# Patient Record
Sex: FEMALE | Race: WHITE
[De-identification: names, ages, dates, MRNs, and addresses within clinical notes are randomized per-mention and may not be internally consistent; named-entity substitution may affect disease eponyms.]

---

## 2021-10-10 ENCOUNTER — TRANSCRIPTION ENCOUNTER (OUTPATIENT)
Age: 66
End: 2021-10-10

## 2021-11-20 ENCOUNTER — TRANSCRIPTION ENCOUNTER (OUTPATIENT)
Age: 66
End: 2021-11-20

## 2022-03-31 ENCOUNTER — EMERGENCY (EMERGENCY)
Facility: HOSPITAL | Age: 67
LOS: 0 days | Discharge: HOME | End: 2022-04-01
Attending: STUDENT IN AN ORGANIZED HEALTH CARE EDUCATION/TRAINING PROGRAM | Admitting: STUDENT IN AN ORGANIZED HEALTH CARE EDUCATION/TRAINING PROGRAM
Payer: MEDICARE

## 2022-03-31 ENCOUNTER — TRANSCRIPTION ENCOUNTER (OUTPATIENT)
Age: 67
End: 2022-03-31

## 2022-03-31 VITALS
TEMPERATURE: 96 F | HEART RATE: 92 BPM | WEIGHT: 108.03 LBS | DIASTOLIC BLOOD PRESSURE: 65 MMHG | RESPIRATION RATE: 18 BRPM | SYSTOLIC BLOOD PRESSURE: 136 MMHG | OXYGEN SATURATION: 99 %

## 2022-03-31 VITALS
SYSTOLIC BLOOD PRESSURE: 117 MMHG | HEART RATE: 79 BPM | TEMPERATURE: 99 F | DIASTOLIC BLOOD PRESSURE: 60 MMHG | OXYGEN SATURATION: 98 % | RESPIRATION RATE: 18 BRPM

## 2022-03-31 DIAGNOSIS — Z88.0 ALLERGY STATUS TO PENICILLIN: ICD-10-CM

## 2022-03-31 DIAGNOSIS — Z90.49 ACQUIRED ABSENCE OF OTHER SPECIFIED PARTS OF DIGESTIVE TRACT: ICD-10-CM

## 2022-03-31 DIAGNOSIS — Z91.013 ALLERGY TO SEAFOOD: ICD-10-CM

## 2022-03-31 DIAGNOSIS — L02.211 CUTANEOUS ABSCESS OF ABDOMINAL WALL: ICD-10-CM

## 2022-03-31 DIAGNOSIS — E03.9 HYPOTHYROIDISM, UNSPECIFIED: ICD-10-CM

## 2022-03-31 DIAGNOSIS — R19.09 OTHER INTRA-ABDOMINAL AND PELVIC SWELLING, MASS AND LUMP: ICD-10-CM

## 2022-03-31 DIAGNOSIS — Z79.01 LONG TERM (CURRENT) USE OF ANTICOAGULANTS: ICD-10-CM

## 2022-03-31 DIAGNOSIS — Z85.3 PERSONAL HISTORY OF MALIGNANT NEOPLASM OF BREAST: ICD-10-CM

## 2022-03-31 DIAGNOSIS — Z85.528 PERSONAL HISTORY OF OTHER MALIGNANT NEOPLASM OF KIDNEY: ICD-10-CM

## 2022-03-31 DIAGNOSIS — L53.8 OTHER SPECIFIED ERYTHEMATOUS CONDITIONS: ICD-10-CM

## 2022-03-31 DIAGNOSIS — Z86.718 PERSONAL HISTORY OF OTHER VENOUS THROMBOSIS AND EMBOLISM: ICD-10-CM

## 2022-03-31 LAB
ALBUMIN SERPL ELPH-MCNC: 4 G/DL — SIGNIFICANT CHANGE UP (ref 3.5–5.2)
ALP SERPL-CCNC: 84 U/L — SIGNIFICANT CHANGE UP (ref 30–115)
ALT FLD-CCNC: 30 U/L — SIGNIFICANT CHANGE UP (ref 0–41)
ANION GAP SERPL CALC-SCNC: 14 MMOL/L — SIGNIFICANT CHANGE UP (ref 7–14)
AST SERPL-CCNC: 34 U/L — SIGNIFICANT CHANGE UP (ref 0–41)
BASOPHILS # BLD AUTO: 0.05 K/UL — SIGNIFICANT CHANGE UP (ref 0–0.2)
BASOPHILS NFR BLD AUTO: 0.6 % — SIGNIFICANT CHANGE UP (ref 0–1)
BILIRUB SERPL-MCNC: 0.5 MG/DL — SIGNIFICANT CHANGE UP (ref 0.2–1.2)
BUN SERPL-MCNC: 18 MG/DL — SIGNIFICANT CHANGE UP (ref 10–20)
CALCIUM SERPL-MCNC: 9 MG/DL — SIGNIFICANT CHANGE UP (ref 8.5–10.1)
CHLORIDE SERPL-SCNC: 99 MMOL/L — SIGNIFICANT CHANGE UP (ref 98–110)
CO2 SERPL-SCNC: 23 MMOL/L — SIGNIFICANT CHANGE UP (ref 17–32)
CREAT SERPL-MCNC: 1 MG/DL — SIGNIFICANT CHANGE UP (ref 0.7–1.5)
EGFR: 63 ML/MIN/1.73M2 — SIGNIFICANT CHANGE UP
EOSINOPHIL # BLD AUTO: 0.03 K/UL — SIGNIFICANT CHANGE UP (ref 0–0.7)
EOSINOPHIL NFR BLD AUTO: 0.4 % — SIGNIFICANT CHANGE UP (ref 0–8)
GLUCOSE SERPL-MCNC: 82 MG/DL — SIGNIFICANT CHANGE UP (ref 70–99)
HCT VFR BLD CALC: 39.3 % — SIGNIFICANT CHANGE UP (ref 37–47)
HGB BLD-MCNC: 12.9 G/DL — SIGNIFICANT CHANGE UP (ref 12–16)
IMM GRANULOCYTES NFR BLD AUTO: 0.2 % — SIGNIFICANT CHANGE UP (ref 0.1–0.3)
LACTATE SERPL-SCNC: 1.1 MMOL/L — SIGNIFICANT CHANGE UP (ref 0.7–2)
LYMPHOCYTES # BLD AUTO: 1.19 K/UL — LOW (ref 1.2–3.4)
LYMPHOCYTES # BLD AUTO: 14.2 % — LOW (ref 20.5–51.1)
MCHC RBC-ENTMCNC: 32.8 G/DL — SIGNIFICANT CHANGE UP (ref 32–37)
MCHC RBC-ENTMCNC: 33.2 PG — HIGH (ref 27–31)
MCV RBC AUTO: 101 FL — HIGH (ref 81–99)
MONOCYTES # BLD AUTO: 1.16 K/UL — HIGH (ref 0.1–0.6)
MONOCYTES NFR BLD AUTO: 13.8 % — HIGH (ref 1.7–9.3)
NEUTROPHILS # BLD AUTO: 5.93 K/UL — SIGNIFICANT CHANGE UP (ref 1.4–6.5)
NEUTROPHILS NFR BLD AUTO: 70.8 % — SIGNIFICANT CHANGE UP (ref 42.2–75.2)
NRBC # BLD: 0 /100 WBCS — SIGNIFICANT CHANGE UP (ref 0–0)
PLATELET # BLD AUTO: 225 K/UL — SIGNIFICANT CHANGE UP (ref 130–400)
POTASSIUM SERPL-MCNC: 4.6 MMOL/L — SIGNIFICANT CHANGE UP (ref 3.5–5)
POTASSIUM SERPL-SCNC: 4.6 MMOL/L — SIGNIFICANT CHANGE UP (ref 3.5–5)
PROT SERPL-MCNC: 6.6 G/DL — SIGNIFICANT CHANGE UP (ref 6–8)
RBC # BLD: 3.89 M/UL — LOW (ref 4.2–5.4)
RBC # FLD: 13.8 % — SIGNIFICANT CHANGE UP (ref 11.5–14.5)
SODIUM SERPL-SCNC: 136 MMOL/L — SIGNIFICANT CHANGE UP (ref 135–146)
WBC # BLD: 8.38 K/UL — SIGNIFICANT CHANGE UP (ref 4.8–10.8)
WBC # FLD AUTO: 8.38 K/UL — SIGNIFICANT CHANGE UP (ref 4.8–10.8)

## 2022-03-31 PROCEDURE — 99284 EMERGENCY DEPT VISIT MOD MDM: CPT | Mod: FS

## 2022-03-31 PROCEDURE — 74177 CT ABD & PELVIS W/CONTRAST: CPT | Mod: 26,MA

## 2022-03-31 PROCEDURE — 10061 I&D ABSCESS COMP/MULTIPLE: CPT

## 2022-03-31 NOTE — ED PROVIDER NOTE - OBJECTIVE STATEMENT
Pt with hx of renal cell CA 2019 and had nephrectomy, splenectomy and partial pancreatectomy. Also required a feeding tube which had been removed in 2019. Pt is currently in remission. Pt woke this am and noticed redness ans swelling to area of the old feeding tube scar. Denies fever, chills, NVDC

## 2022-03-31 NOTE — ED ADULT TRIAGE NOTE - CHIEF COMPLAINT QUOTE
Pt sent from urgent care for evaluation of possible abdominal mass. Pt denies fever/nausea/vomiting, reports pain during palpitation of area.

## 2022-03-31 NOTE — ED PROVIDER NOTE - PROVIDER TOKENS
PROVIDER:[TOKEN:[[id number]],FOLLOWUP:[1-3 Days]],FREE:[LAST:[Your oncologist],PHONE:[(   )    -],FAX:[(   )    -],FOLLOWUP:[1-3 Days]]

## 2022-03-31 NOTE — ED PROVIDER NOTE - CARE PROVIDER_API CALL
Bolivar Cardona (DO)  Surgery; Surgical Critical Care  [address] [place] [address]  Phone: [phone]  Fax: [phone]  Follow Up Time: 1-3 Days    Your oncologist,   Phone: (   )    -  Fax: (   )    -  Follow Up Time: 1-3 Days

## 2022-03-31 NOTE — ED PROVIDER NOTE - ATTENDING CONTRIBUTION TO CARE
66-year-old female with history of left renal cell carcinoma in 2019 s/p nephrectomy, splenectomy, partial pancreatectomy, presents with swelling and redness to her epigastric abdomen at the site of her prior remote PEG tube placement. Denies fever, chills, vomiting, diarrhea, constipation, hematochezia, and all other symptoms. On exam, afebrile, hemodynamically stable, saturating well, NAD, well appearing, sitting comfortably in chair, no WOB, speaking full sentences, head NCAT, EOMI grossly, anicteric, MMM, breathing comfortably on room air, abd soft, NT, ND, no rebound or guarding, noted epigastric erythema with swelling/induration, AAO, CN's 3-12 grossly intact, SAMSON spontaneously, no leg cyanosis or edema, skin warm, well perfused. 66-year-old female with history of left renal cell carcinoma in 2019 s/p nephrectomy, splenectomy, partial pancreatectomy, presents with swelling and redness to her epigastric abdomen at the site of her prior remote PEG tube placement. Denies fever, chills, vomiting, diarrhea, constipation, hematochezia, and all other symptoms. On exam, afebrile, hemodynamically stable, saturating well, NAD, well appearing, sitting comfortably in chair, no WOB, speaking full sentences, head NCAT, EOMI grossly, anicteric, MMM, breathing comfortably on room air, abd soft, NT, ND, no rebound or guarding, noted epigastric erythema with swelling/induration, AAO, CN's 3-12 grossly intact, SAMSON spontaneously, no leg cyanosis or edema, skin warm, well perfused. Appearance c/w abdominal wall abscess, confirmed on CT. Also concern for internal mass as well as concern for new onset metastatic lesions. Surgery consulted and 66-year-old female with history of left renal cell carcinoma in 2019 s/p nephrectomy, splenectomy, partial pancreatectomy, presents with swelling and redness to her epigastric abdomen at the site of her prior remote PEG tube placement. Denies fever, chills, vomiting, diarrhea, constipation, hematochezia, and all other symptoms. On exam, afebrile, hemodynamically stable, saturating well, NAD, well appearing, sitting comfortably in chair, no WOB, speaking full sentences, head NCAT, EOMI grossly, anicteric, MMM, breathing comfortably on room air, abd soft, NT, ND, no rebound or guarding, noted epigastric erythema with swelling/induration, AAO, CN's 3-12 grossly intact, SAMSON spontaneously, no leg cyanosis or edema, skin warm, well perfused. Appearance c/w abdominal wall abscess, confirmed on CT. Also concern for internal mass as well as concern for new onset metastatic lesions. Surgery consulted and pending. NAD, well appearing. Signed off care to Dr. TIFFANIE Padilla who will f/u surg c/s. 66-year-old female with history of left renal cell carcinoma in 2019 s/p nephrectomy, splenectomy, partial pancreatectomy, presents with swelling and redness to her epigastric abdomen at the site of her prior remote PEG tube placement. Denies fever, chills, vomiting, diarrhea, constipation, hematochezia, and all other symptoms. On exam, afebrile, hemodynamically stable, saturating well, NAD, well appearing, sitting comfortably in chair, no WOB, speaking full sentences, head NCAT, EOMI grossly, anicteric, MMM, breathing comfortably on room air, abd soft, NT, ND, no rebound or guarding, noted epigastric erythema with swelling/induration, AAO, CN's 3-12 grossly intact, SAMSON spontaneously, no leg cyanosis or edema, skin warm, well perfused. Appearance c/w abdominal wall abscess, confirmed on CT. Also concern for internal mass as well as concern for new onset metastatic lesions. Surgery consulted and I d/w Dr. Martins and will I&D discharged pt, does not think there is a new abdominal mass but rather a surgical clip from prior. This is difficult to verify without prior imaging comparison. At any rate pt has good onc f/u and I d/w pt and son the findings and will f/u closely. NAD, well appearing. Signed off care to Dr. TIFFANIE Padilla who will f/u after I&D, anticipate discharge on abx.

## 2022-03-31 NOTE — ED PROVIDER NOTE - NS ED ROS FT
CONST: No fever, chills or bodyaches  EYES: No pain, redness, drainage or visual changes.  ENT: No ear pain or discharge, nasal discharge or congestion. No sore throat  CARD: No chest pain, palpitations  RESP: No SOB, cough, hemoptysis. No hx of asthma or COPD  GI: No abdominal pain, N/V/D  MS: No joint pain, back pain or extremity pain/injury  SKIN: No rashes  NEURO: No headache, dizziness, paresthesias or LOC  : No dysuria

## 2022-03-31 NOTE — CONSULT NOTE ADULT - SUBJECTIVE AND OBJECTIVE BOX
GENERAL SURGERY CONSULT NOTE    Patient: PARUL SOTO , 66y ([birth year])Female   MRN: [id number]  Location: [facility] ED  Visit: 03-31-22 Emergency  Date: 03-31-22 @ 22:26    HPI:      PAST MEDICAL & SURGICAL HISTORY:      Home Medications:        VITALS:  T(F): 98.8 (03-31-22 @ 20:03), Max: 98.8 (03-31-22 @ 20:03)  HR: 79 (03-31-22 @ 20:03) (79 - 92)  BP: 117/60 (03-31-22 @ 20:03) (117/60 - 136/65)  RR: 18 (03-31-22 @ 20:03) (18 - 18)  SpO2: 98% (03-31-22 @ 20:03) (98% - 99%)    PHYSICAL EXAM:  General: NAD, AAOx3, calm and cooperative  HEENT: NCAT, TIM, EOMI, Trachea ML, Neck supple  Cardiac: RRR S1, S2, no Murmurs, rubs or gallops  Respiratory: CTAB, normal respiratory effort, breath sounds equal BL, no wheeze, rhonchi or crackles  Abdomen: Soft, non-distended, non-tender, no rebound, no guarding. +BS.  Rectal: Good tone, +stool, no blood, no nadine-anal masses/lesions, no fistulas, fissures, hemorrhoids  Musculoskeletal: Strength 5/5 BL UE/LE, ROM intact, compartments soft  Neuro: Sensation grossly intact and equal throughout, no focal deficits  Vascular: Pulses 2+ throughout, extremities well perfused  Skin: Warm/dry, normal color, no jaundice  Incision/wound: healing well, dressings in place, clean, dry and intact    MEDICATIONS  (STANDING):    MEDICATIONS  (PRN):      LAB/STUDIES:                        12.9   8.38  )-----------( 225      ( 31 Mar 2022 17:55 )             39.3     03-31    136  |  99  |  18  ----------------------------<  82  4.6   |  23  |  1.0    Ca    9.0      31 Mar 2022 17:55    TPro  6.6  /  Alb  4.0  /  TBili  0.5  /  DBili  x   /  AST  34  /  ALT  30  /  AlkPhos  84  03-31      LIVER FUNCTIONS - ( 31 Mar 2022 17:55 )  Alb: 4.0 g/dL / Pro: 6.6 g/dL / ALK PHOS: 84 U/L / ALT: 30 U/L / AST: 34 U/L / GGT: x                         IMAGING:      ACCESS DEVICES:  [ ] Peripheral IV  [ ] Central Venous Line	[ ] R	[ ] L	[ ] IJ	[ ] Fem	[ ] SC	Placed:   [ ] Arterial Line		[ ] R	[ ] L	[ ] Fem	[ ] Rad	[ ] Ax	Placed:   [ ] PICC:					[ ] Mediport  [ ] Urinary Catheter, Date Placed:        GENERAL SURGERY CONSULT NOTE    Patient: PARUL SOTO , 66y ([birth year])Female   MRN: [id number]  Location: [facility] ED  Visit: 03-31-22 Emergency  Date: 03-31-22 @ 22:26    HPI:  66yF w/ PMHx of Breast CA (2014), RCC s/p resection (2019), DVT on eliquis and Hypothyroidism  who presented with abdominal pain. Pt reports that when she woke up this morning, she noticed abdominal changes and pain. The pain is constant, od moderate intensity and does not improve. The pt reports that she also noticed some skin cranages in the abdomen, but denies any previous episode  Physical exam findings, imaging, and labs as documented above.       PAST MEDICAL & SURGICAL HISTORY:      Home Medications:        VITALS:  T(F): 98.8 (03-31-22 @ 20:03), Max: 98.8 (03-31-22 @ 20:03)  HR: 79 (03-31-22 @ 20:03) (79 - 92)  BP: 117/60 (03-31-22 @ 20:03) (117/60 - 136/65)  RR: 18 (03-31-22 @ 20:03) (18 - 18)  SpO2: 98% (03-31-22 @ 20:03) (98% - 99%)    PHYSICAL EXAM:  General: NAD, AAOx3, calm and cooperative  Cardiac: RRR S1, S2, no Murmurs, rubs or gallops  Respiratory: CTAB, normal respiratory effort  Abdomen: Soft, non-distended, tender, epigastric area mass with surrounding erythema, no rebound, no guarding.   Musculoskeletal: Strength 5/5 BL UE/LE, ROM intact, compartments soft  Incision/wound: healing well, dressings in place, clean, dry and intact    MEDICATIONS  (STANDING):    MEDICATIONS  (PRN):      LAB/STUDIES:                        12.9   8.38  )-----------( 225      ( 31 Mar 2022 17:55 )             39.3     03-31    136  |  99  |  18  ----------------------------<  82  4.6   |  23  |  1.0    Ca    9.0      31 Mar 2022 17:55    TPro  6.6  /  Alb  4.0  /  TBili  0.5  /  DBili  x   /  AST  34  /  ALT  30  /  AlkPhos  84  03-31      LIVER FUNCTIONS - ( 31 Mar 2022 17:55 )  Alb: 4.0 g/dL / Pro: 6.6 g/dL / ALK PHOS: 84 U/L / ALT: 30 U/L / AST: 34 U/L / GGT: x                         IMAGING:      ACCESS DEVICES:  [ ] Peripheral IV  [ ] Central Venous Line	[ ] R	[ ] L	[ ] IJ	[ ] Fem	[ ] SC	Placed:   [ ] Arterial Line		[ ] R	[ ] L	[ ] Fem	[ ] Rad	[ ] Ax	Placed:   [ ] PICC:					[ ] Mediport  [ ] Urinary Catheter, Date Placed:        GENERAL SURGERY CONSULT NOTE    Patient: PARUL SOTO , 66y ([birth year])Female   MRN: [id number]  Location: [facility] ED  Visit: 03-31-22 Emergency  Date: 03-31-22 @ 22:26    HPI:  66yF w/ PMHx of Breast CA (2014), RCC s/p resection (2019), DVT on eliquis and Hypothyroidism  who presented with abdominal pain. Pt reports that when she woke up this morning, she noticed abdominal changes and pain. The pain is constant, of moderate intensity and does not improve. The pt reports that she also noticed some skin changes redness in the abdomen, but denies any previous episode or purulent secretions.  Of note pt has a hx of peg tube placed in 2019 x 4 months, current area of erythema and pain is localized in the scar of previous tube placement.     PAST MEDICAL & SURGICAL HISTORY:      Home Medications:        VITALS:  T(F): 98.8 (03-31-22 @ 20:03), Max: 98.8 (03-31-22 @ 20:03)  HR: 79 (03-31-22 @ 20:03) (79 - 92)  BP: 117/60 (03-31-22 @ 20:03) (117/60 - 136/65)  RR: 18 (03-31-22 @ 20:03) (18 - 18)  SpO2: 98% (03-31-22 @ 20:03) (98% - 99%)    PHYSICAL EXAM:  General: NAD, AAOx3, calm and cooperative  Cardiac: RRR S1, S2, no Murmurs, rubs or gallops  Respiratory: CTAB, normal respiratory effort  Abdomen: Soft, non-distended, tender, epigastric area mass with surrounding erythema, no rebound, no guarding.   Musculoskeletal: Strength 5/5 BL UE/LE, ROM intact, compartments soft  Incision/wound: healing well, dressings in place, clean, dry and intact    MEDICATIONS  (STANDING):    MEDICATIONS  (PRN):      LAB/STUDIES:                        12.9   8.38  )-----------( 225      ( 31 Mar 2022 17:55 )             39.3     03-31    136  |  99  |  18  ----------------------------<  82  4.6   |  23  |  1.0    Ca    9.0      31 Mar 2022 17:55    TPro  6.6  /  Alb  4.0  /  TBili  0.5  /  DBili  x   /  AST  34  /  ALT  30  /  AlkPhos  84  03-31      LIVER FUNCTIONS - ( 31 Mar 2022 17:55 )  Alb: 4.0 g/dL / Pro: 6.6 g/dL / ALK PHOS: 84 U/L / ALT: 30 U/L / AST: 34 U/L / GGT: x                         IMAGING:  < from: CT Abdomen and Pelvis w/ IV Cont (03.31.22 @ 20:29) >      IMPRESSION:    Anterior abdominal wall 3.3 x 2.8 cm rim-enhancing collection/cystic mass.    Left sacral 4.0 x 3.8 x 3.4 cm enhancing destructive soft tissue mass.    Scattered bilobar hepatic rim-enhancing hypodensities with surrounding   hyperemia. Findings concerning for metastatic disease.    --- End of Report ---          < end of copied text >      ACCESS DEVICES:  [ ] Peripheral IV  [ ] Central Venous Line	[ ] R	[ ] L	[ ] IJ	[ ] Fem	[ ] SC	Placed:   [ ] Arterial Line		[ ] R	[ ] L	[ ] Fem	[ ] Rad	[ ] Ax	Placed:   [ ] PICC:					[ ] Mediport  [ ] Urinary Catheter, Date Placed:        GENERAL SURGERY CONSULT NOTE    Patient: PARUL SOTO , 66y ([birth year])Female   MRN: [id number]  Location: [facility] ED  Visit: 03-31-22 Emergency  Date: 03-31-22 @ 22:26    HPI:  66yF w/ PMHx of Breast CA (2014), RCC s/p resection with splenectomy and distal pancreatectomy (2019), Patient underwent to chemotherapy, with develop of liver lesions and sacral lesion, currently on Cabometyx, f/u by oncology in [facility],  DVT on eliquis and Hypothyroidism  who presented with abdominal pain. Pt reports that when she woke up this morning, she noticed abdominal changes and pain. The pain is constant, of moderate intensity and does not improve. The pt reports that she also noticed some skin changes redness in the abdomen, but denies any previous episode or purulent secretions.  Of note pt has a hx of peg tube placed in 2019 x 4 months, current area of erythema and pain is localized in the scar of previous tube placement.     PAST MEDICAL & SURGICAL HISTORY:      Home Medications:        VITALS:  T(F): 98.8 (03-31-22 @ 20:03), Max: 98.8 (03-31-22 @ 20:03)  HR: 79 (03-31-22 @ 20:03) (79 - 92)  BP: 117/60 (03-31-22 @ 20:03) (117/60 - 136/65)  RR: 18 (03-31-22 @ 20:03) (18 - 18)  SpO2: 98% (03-31-22 @ 20:03) (98% - 99%)    PHYSICAL EXAM:  General: NAD, AAOx3, calm and cooperative  Cardiac: RRR S1, S2, no Murmurs, rubs or gallops  Respiratory: CTAB, normal respiratory effort  Abdomen: Soft, non-distended, tender, epigastric area mass with surrounding erythema, no rebound, no guarding.   Musculoskeletal: Strength 5/5 BL UE/LE, ROM intact, compartments soft  Incision/wound: healing well, dressings in place, clean, dry and intact    MEDICATIONS  (STANDING):    MEDICATIONS  (PRN):      LAB/STUDIES:                        12.9   8.38  )-----------( 225      ( 31 Mar 2022 17:55 )             39.3     03-31    136  |  99  |  18  ----------------------------<  82  4.6   |  23  |  1.0    Ca    9.0      31 Mar 2022 17:55    TPro  6.6  /  Alb  4.0  /  TBili  0.5  /  DBili  x   /  AST  34  /  ALT  30  /  AlkPhos  84  03-31      LIVER FUNCTIONS - ( 31 Mar 2022 17:55 )  Alb: 4.0 g/dL / Pro: 6.6 g/dL / ALK PHOS: 84 U/L / ALT: 30 U/L / AST: 34 U/L / GGT: x                         IMAGING:  < from: CT Abdomen and Pelvis w/ IV Cont (03.31.22 @ 20:29) >      IMPRESSION:    Anterior abdominal wall 3.3 x 2.8 cm rim-enhancing collection/cystic mass.    Left sacral 4.0 x 3.8 x 3.4 cm enhancing destructive soft tissue mass.    Scattered bilobar hepatic rim-enhancing hypodensities with surrounding   hyperemia. Findings concerning for metastatic disease.    --- End of Report ---          < end of copied text >      ACCESS DEVICES:  [ ] Peripheral IV  [ ] Central Venous Line	[ ] R	[ ] L	[ ] IJ	[ ] Fem	[ ] SC	Placed:   [ ] Arterial Line		[ ] R	[ ] L	[ ] Fem	[ ] Rad	[ ] Ax	Placed:   [ ] PICC:					[ ] Mediport  [ ] Urinary Catheter, Date Placed:

## 2022-03-31 NOTE — ED PROVIDER NOTE - PROGRESS NOTE DETAILS
NAD, well appearing. Signed off care to Dr. TIFFANIE Padilla who will f/u surg c/s. NAD, well appearing. Signed off care to Dr. TIFFANIE Padilla who will f/u after I&D, anticipate discharge on abx.

## 2022-03-31 NOTE — CONSULT NOTE ADULT - ASSESSMENT
ASSESSMENT:  66yF w/ PMHx of Breast CA (2014), RCC s/p resection (2019), DVT on eliquis and Hypothyroidism  who presented with ***. Physical exam findings, imaging, and labs as documented above.     PLAN:  -  -  -    Lines/Tubes: PIV, Midline, Central Line, A-Line, Chest tubes, Anand/BABS drains, George Catheter.    Above plan discussed with Attending Surgeon  ***  , patient, patient family, and Primary team  03-31-22 @ 22:26    CONSULT SPECTRA: [id number] ASSESSMENT:  66yF w/ PMHx of Breast CA (2014), RCC s/p resection (2019), DVT on eliquis and Hypothyroidism  who presented with abdominal pain. Pt reports that when she woke up this morning, she noticed abdominal changes and pain. The pain is constant, od moderate intensity and does not improve. The pt reports that she also noticed some skin cranages in the abdomen, but denies any previous episode  Physical exam findings, imaging, and labs as documented above.     PLAN:  -I&D done at bedside  -DC on clindamycin  -follow up with Dr Cardona  -follow up abdominal wall abscess  -follow up with primary surgeon and heme/onc at [facility] as per ED    Lines/Tubes: PIV, Midline, Central Line, A-Line, Chest tubes, Anand/BABS drains, George Catheter.    Above plan discussed with Attending Surgeon  ***  , patient, patient family, and Primary team  03-31-22 @ 22:26    CONSULT SPECTRA: [id number] ASSESSMENT:  66yF w/ PMHx of Breast CA (2014), RCC s/p resection (2019), DVT on eliquis and Hypothyroidism  who presented with abdominal pain. Pt evaluated at bedside AAOX3, NAD on physical exam: midepigastric abdominal mass, that has surrounding erythema, fluctuating, indurated with tenderness to palpation VS: 96.5T, 92HR, 135/65BP. LABS: WBC 8.38, Neutrophil 70.8, Hgb 12.9, Cr 1, lactate 1.1. On CT: Anterior abdominal wall 3.3 x 2.8 cm rim-enhancing collection/cystic mass. Scattered bilobar hepatic rim-enhancing hypodensities with surrounding hyperemia. Findings concerning for metastatic disease. Pt is aware of lesions in her live and follows closely with her heme/onc with a CT [id number], next appointment will be in April. Pt consented for I&D at bedside, tolerated procedure well packed with 1/2in packing.       PLAN:  -I&D done at bedside  -Hold eliquis today, restart eliquis 4/1  -DC on clindamycin  -follow up with Dr Cardona  -follow up abdominal wall abscess  -follow up with primary surgeon and heme/onc at [facility] as per ED        Above plan discussed with Attending Surgeon Dr. Cardona  , patient, patient family, and Primary team  03-31-22 @ 22:26    CONSULT SPECTRA: [id number] ASSESSMENT:  66yF w/ PMHx of Breast CA (2014), RCC s/p resection (2019), DVT on eliquis and Hypothyroidism  who presented with abdominal pain. Pt evaluated at bedside AAOX3, NAD on physical exam: midepigastric abdominal mass, that has surrounding erythema, fluctuating, indurated with tenderness to palpation VS: 96.5T, 92HR, 135/65BP. LABS: WBC 8.38, Neutrophil 70.8, Hgb 12.9, Cr 1, lactate 1.1. On CT: Anterior abdominal wall 3.3 x 2.8 cm rim-enhancing collection/cystic mass. Scattered bilobar hepatic rim-enhancing hypodensities with surrounding hyperemia. Findings concerning for metastatic disease. Pt is aware of lesions in her liver and follows closely with her heme/onc with a CT [id number], next appointment will be in April. Pt consented for I&D at bedside, tolerated procedure well packed with 1/2in packing.       PLAN:  -I&D done at bedside  -Hold eliquis today, restart eliquis 4/1  -DC on clindamycin (PCN allergy)  -follow up with Dr Cardona  -follow up abdominal wall abscess  -follow up with primary surgeon and heme/onc at [facility] as per ED        Above plan discussed with Attending Surgeon Dr. Cardona  , patient, patient family, and Primary team  03-31-22 @ 22:26    CONSULT SPECTRA: [id number]    Senior Note  I have personally examined and evaluated the patient  I agree with the above plan and note, and I have edited where appropriate  Surgical Attending aware and agrees with plan  Above plan discussed with Trauma attending, Dr. Cardona, patient, patient family, and ED team

## 2022-03-31 NOTE — CONSULT NOTE ADULT - TIME BILLING
66yF w/ PMHx of Breast CA (2014), RCC s/p resection (2019), DVT on eliquis and Hypothyroidism  who presented with abdominal pain. Pt evaluated at bedside AAOX3, NAD on physical exam: midepigastric abdominal mass, that has surrounding erythema, fluctuating, indurated with tenderness to palpation VS: 96.5T, 92HR, 135/65BP. LABS: WBC 8.38, Neutrophil 70.8, Hgb 12.9, Cr 1, lactate 1.1. On CT: Anterior abdominal wall 3.3 x 2.8 cm rim-enhancing collection/cystic mass. Scattered bilobar hepatic rim-enhancing hypodensities with surrounding hyperemia. Findings concerning for metastatic disease. Pt is aware of lesions in her liver and follows closely with her heme/onc with a CT [id number], next appointment will be in April. Pt consented for I&D at bedside, tolerated procedure well packed with 1/2in packing.       PLAN:  -I&D done at bedside  -Hold eliquis today, restart eliquis 4/1  -DC on clindamycin (PCN allergy)   - folow up in office one week   - remove packing in two days    -follow up with primary surgeon and heme/onc at [facility]

## 2022-03-31 NOTE — ED PROVIDER NOTE - PATIENT PORTAL LINK FT
You can access the FollowMyHealth Patient Portal offered by [facility] by registering at the following website: [email or web address]. By joining [facility]’s FollowMyHealth portal, you will also be able to view your health information using other applications (apps) compatible with our system.

## 2022-03-31 NOTE — ED PROVIDER NOTE - IV ALTEPLASE EXCL REL HIDDEN
[facility]  Inpatient/Observation/Outpatient Rehabilitation    Date: 10/15/2021  Patient Name: Curry Leach       [x] Inpatient Acute/Observation       []  Outpatient  : 1930       [] Pt no showed for scheduled appointment    [] Pt refused/declined therapy at this time due to:           [x] Pt cancelled due to:  [] No Reason Given   [] Sick/ill   [] Other: Patient asleep and unable to rouse at this time. Will attempt evaluation at our earliest opportunity.        Clayton Ames, PT, DPT Date: 10/15/2021 show

## 2022-03-31 NOTE — ED ADULT TRIAGE NOTE - BRAND OF FIRST COVID-19 BOOSTER
V-Y Flap Text: The defect edges were debeveled with a #15 scalpel blade.  Given the location of the defect, shape of the defect and the proximity to free margins a V-Y flap was deemed most appropriate.  Using a sterile surgical marker, an appropriate advancement flap was drawn incorporating the defect and placing the expected incisions within the relaxed skin tension lines where possible.    The area thus outlined was incised deep to adipose tissue with a #15 scalpel blade.  The skin margins were undermined to an appropriate distance in all directions utilizing iris scissors. [employer]

## 2022-03-31 NOTE — ED PROVIDER NOTE - CLINICAL SUMMARY MEDICAL DECISION MAKING FREE TEXT BOX
ct w/ abd wall abscess, other findings c/w malignancy known to pt and family.  surg consulted and s/p I&D, IV abx given in ED and pt sent home w/ clindamycin

## 2022-03-31 NOTE — ED PROVIDER NOTE - NS ED ATTENDING STATEMENT MOD
This was a shared visit with the [facility]. I reviewed and verified the documentation and independently performed the documented:

## 2022-03-31 NOTE — ED PROVIDER NOTE - NSFOLLOWUPINSTRUCTIONS_ED_ALL_ED_FT
Discharge Care           Abscess Incision and Drainage    WHAT YOU NEED TO KNOW:    An abscess incision and drainage (I and D) is a procedure to drain pus from an abscess and clean it out so it can heal.    DISCHARGE INSTRUCTIONS:    Contact your healthcare provider if:   •The area around your abscess has red streaks or is warm and painful.       •You have a fever or chills.       •You have increased redness, swelling, or pain in your wound.      •Your wound does not start to heal after a few days.      •Your abscess returns.       •You have questions or concerns about your condition or care.      Medicines:   •NSAIDs, such as ibuprofen, help decrease swelling, pain, and fever. NSAIDs can cause stomach bleeding or kidney problems in certain people. If you take blood thinner medicine, always ask your healthcare provider if NSAIDs are safe for you. Always read the medicine label and follow directions.      •Take your medicine as directed. Contact your healthcare provider if you think your medicine is not helping or if you have side effects. Tell him or her if you are allergic to any medicine. Keep a list of the medicines, vitamins, and herbs you take. Include the amounts, and when and why you take them. Bring the list or the pill bottles to follow-up visits. Carry your medicine list with you in case of an emergency.      Care for your wound as directed:   •Do not remove your bandage unless your healthcare provider says it is okay. Keep the bandage clean and dry. Remove your bandage and clean the wound once your healthcare provider gives you directions.       •Apply heat on the bandage over your wound for 20 to 30 minutes every 2 hours for as many days as directed. This will increase blood flow to the area and help it heal.      •Elevate your wound above level of your heart as often as you can. This will help decrease swelling and pain. Prop your wounded area on pillows or blankets to keep it elevated comfortably.      Follow up with your healthcare provider as directed: You may need to return in 1 to 3 days to have the gauze in your wound removed and your wound examined. You may be taught how to change the gauze in your wound. Write down your questions so you remember to ask them during your visits.

## 2022-03-31 NOTE — ED ADULT NURSE NOTE - HOW OFTEN DO YOU HAVE A DRINK CONTAINING ALCOHOL?
[FreeTextEntry1] : Doing well\par Skin graft was cleaned with soap and water\par Will place Unna's boot and elastic wrap\par Patient will moisturize with a an emollient to donor site\par RTC 1 week Never

## 2022-03-31 NOTE — ED PROVIDER NOTE - PHYSICAL EXAMINATION
CONST: Well appearing in NAD  EYES: PERRL, EOMI, Sclera and conjunctiva clear.   NECK: Non-tender, no meningeal signs  CARD: Normal S1 S2; Normal rate and rhythm  RESP: Equal BS B/L, No wheezes, rhonchi or rales. No distress  GI: Soft, non-tender, non-distended. There is erythema and tenderness and induration mid abdominal wall in area of old surgical scar of feeding tube  MS: Normal ROM in all extremities. No midline spinal tenderness.  SKIN: Warm, dry, no acute rashes. Good turgor  NEURO: A&Ox3, No focal deficits. Strength 5/5 with no sensory deficits. Steady gait

## 2022-03-31 NOTE — ED ADULT NURSE REASSESSMENT NOTE - NS ED NURSE REASSESS COMMENT FT1
Patient came to the ED complaining of stomach pain. Patient is GCS of 15. Patient can follow simple and complex commands. Will continue to monitor patient for acute changes in vital signs.

## 2022-04-01 PROBLEM — Z00.00 ENCOUNTER FOR PREVENTIVE HEALTH EXAMINATION: Status: ACTIVE | Noted: 2022-04-01

## 2022-04-01 RX ADMIN — Medication 100 MILLIGRAM(S): at 00:19

## 2022-04-04 ENCOUNTER — APPOINTMENT (OUTPATIENT)
Dept: SURGERY | Facility: CLINIC | Age: 67
End: 2022-04-04
Payer: MEDICARE

## 2022-04-04 VITALS
BODY MASS INDEX: 19.76 KG/M2 | DIASTOLIC BLOOD PRESSURE: 86 MMHG | HEIGHT: 61.5 IN | OXYGEN SATURATION: 98 % | SYSTOLIC BLOOD PRESSURE: 120 MMHG | WEIGHT: 106 LBS | HEART RATE: 86 BPM | TEMPERATURE: 96.3 F

## 2022-04-04 PROCEDURE — 99024 POSTOP FOLLOW-UP VISIT: CPT

## 2022-04-06 LAB
CULTURE RESULTS: SIGNIFICANT CHANGE UP
SPECIMEN SOURCE: SIGNIFICANT CHANGE UP

## 2022-04-09 NOTE — HISTORY OF PRESENT ILLNESS
[de-identified] : This is 65 y/o female who was sent to my office to be evaluated for abscess of the abdominal wall that was open by ED [facility]  a couple of days ago. [de-identified] : Patient has history of renal cell Ca of left kidney and had Left nephrectomy, splenectomy , adrenalectomy and tail of pancreas resected  a few years ago.\par Recently developed an abscess in the epigastric area where there is a scar from her surgical procedure.\par The wound is currently packed. She has no pain

## 2022-04-09 NOTE — PLAN
[FreeTextEntry1] : It is healing by secondary intention.\par Recommended to continue daily packing and dressing changes.\par I advised her to follow with her Surgical oncologist as the abscess is in the are of old scar and may need to be further evaluated by surgical oncologist to rule out local recurrence. She understood and agree to follow with her surgeon who performed her nephrectomy\par Advised to complete her Cleocin 300 mg q6h\par Follow up with me as needed

## 2022-04-09 NOTE — PHYSICAL EXAM
[JVD] : no jugular venous distention  [Normal Thyroid] : the thyroid was normal [Normal Breath Sounds] : Normal breath sounds [Normal Heart Sounds] : normal heart sounds [Normal Rate and Rhythm] : normal rate and rhythm [Abdominal Masses] : No abdominal masses [Abdomen Tenderness] : ~T ~M No abdominal tenderness [Tender] : was nontender [Enlarged] : not enlarged [No Rash or Lesion] : No rash or lesion [Alert] : alert [Oriented to Person] : oriented to person [Oriented to Place] : oriented to place [Oriented to Time] : oriented to time [Calm] : calm [de-identified] : comfortable [de-identified] : MARY CARMEN [de-identified] : supple [de-identified] : soft, nontender; in the epigastric area there is wound around 2 cm from I&D of abscess  [de-identified] : wnl

## 2022-04-21 ENCOUNTER — APPOINTMENT (OUTPATIENT)
Dept: SURGERY | Facility: CLINIC | Age: 67
End: 2022-04-21

## 2022-07-08 ENCOUNTER — INPATIENT (INPATIENT)
Facility: HOSPITAL | Age: 67
LOS: 4 days | Discharge: HOME | End: 2022-07-13
Attending: SURGERY | Admitting: SURGERY

## 2022-07-08 VITALS
RESPIRATION RATE: 18 BRPM | HEART RATE: 78 BPM | DIASTOLIC BLOOD PRESSURE: 75 MMHG | TEMPERATURE: 99 F | SYSTOLIC BLOOD PRESSURE: 132 MMHG | OXYGEN SATURATION: 98 %

## 2022-07-08 LAB
ALBUMIN SERPL ELPH-MCNC: 3.9 G/DL — SIGNIFICANT CHANGE UP (ref 3.5–5.2)
ALP SERPL-CCNC: 85 U/L — SIGNIFICANT CHANGE UP (ref 30–115)
ALT FLD-CCNC: 26 U/L — SIGNIFICANT CHANGE UP (ref 0–41)
ANION GAP SERPL CALC-SCNC: 14 MMOL/L — SIGNIFICANT CHANGE UP (ref 7–14)
APPEARANCE UR: CLEAR — SIGNIFICANT CHANGE UP
AST SERPL-CCNC: 30 U/L — SIGNIFICANT CHANGE UP (ref 0–41)
BACTERIA # UR AUTO: NEGATIVE — SIGNIFICANT CHANGE UP
BASOPHILS # BLD AUTO: 0.02 K/UL — SIGNIFICANT CHANGE UP (ref 0–0.2)
BASOPHILS NFR BLD AUTO: 0.2 % — SIGNIFICANT CHANGE UP (ref 0–1)
BILIRUB DIRECT SERPL-MCNC: <0.2 MG/DL — SIGNIFICANT CHANGE UP (ref 0–0.3)
BILIRUB INDIRECT FLD-MCNC: >0.2 MG/DL — SIGNIFICANT CHANGE UP (ref 0.2–1.2)
BILIRUB SERPL-MCNC: 0.4 MG/DL — SIGNIFICANT CHANGE UP (ref 0.2–1.2)
BILIRUB UR-MCNC: NEGATIVE — SIGNIFICANT CHANGE UP
BUN SERPL-MCNC: 14 MG/DL — SIGNIFICANT CHANGE UP (ref 10–20)
CALCIUM SERPL-MCNC: 9.5 MG/DL — SIGNIFICANT CHANGE UP (ref 8.5–10.1)
CHLORIDE SERPL-SCNC: 99 MMOL/L — SIGNIFICANT CHANGE UP (ref 98–110)
CO2 SERPL-SCNC: 25 MMOL/L — SIGNIFICANT CHANGE UP (ref 17–32)
COLOR SPEC: SIGNIFICANT CHANGE UP
CREAT SERPL-MCNC: 1 MG/DL — SIGNIFICANT CHANGE UP (ref 0.7–1.5)
DIFF PNL FLD: NEGATIVE — SIGNIFICANT CHANGE UP
EGFR: 62 ML/MIN/1.73M2 — SIGNIFICANT CHANGE UP
EOSINOPHIL # BLD AUTO: 0 K/UL — SIGNIFICANT CHANGE UP (ref 0–0.7)
EOSINOPHIL NFR BLD AUTO: 0 % — SIGNIFICANT CHANGE UP (ref 0–8)
EPI CELLS # UR: 5 /HPF — SIGNIFICANT CHANGE UP (ref 0–5)
GLUCOSE SERPL-MCNC: 100 MG/DL — HIGH (ref 70–99)
GLUCOSE UR QL: SIGNIFICANT CHANGE UP
HCT VFR BLD CALC: 37.1 % — SIGNIFICANT CHANGE UP (ref 37–47)
HGB BLD-MCNC: 12.4 G/DL — SIGNIFICANT CHANGE UP (ref 12–16)
HYALINE CASTS # UR AUTO: 1 /LPF — SIGNIFICANT CHANGE UP (ref 0–7)
IMM GRANULOCYTES NFR BLD AUTO: 0.3 % — SIGNIFICANT CHANGE UP (ref 0.1–0.3)
KETONES UR-MCNC: NEGATIVE — SIGNIFICANT CHANGE UP
LACTATE SERPL-SCNC: 1.6 MMOL/L — SIGNIFICANT CHANGE UP (ref 0.7–2)
LEUKOCYTE ESTERASE UR-ACNC: ABNORMAL
LIDOCAIN IGE QN: 11 U/L — SIGNIFICANT CHANGE UP (ref 7–60)
LYMPHOCYTES # BLD AUTO: 0.78 K/UL — LOW (ref 1.2–3.4)
LYMPHOCYTES # BLD AUTO: 8.5 % — LOW (ref 20.5–51.1)
MAGNESIUM SERPL-MCNC: 1.7 MG/DL — LOW (ref 1.8–2.4)
MCHC RBC-ENTMCNC: 32.9 PG — HIGH (ref 27–31)
MCHC RBC-ENTMCNC: 33.4 G/DL — SIGNIFICANT CHANGE UP (ref 32–37)
MCV RBC AUTO: 98.4 FL — SIGNIFICANT CHANGE UP (ref 81–99)
MONOCYTES # BLD AUTO: 0.91 K/UL — HIGH (ref 0.1–0.6)
MONOCYTES NFR BLD AUTO: 9.9 % — HIGH (ref 1.7–9.3)
NEUTROPHILS # BLD AUTO: 7.48 K/UL — HIGH (ref 1.4–6.5)
NEUTROPHILS NFR BLD AUTO: 81.1 % — HIGH (ref 42.2–75.2)
NITRITE UR-MCNC: NEGATIVE — SIGNIFICANT CHANGE UP
NRBC # BLD: 0 /100 WBCS — SIGNIFICANT CHANGE UP (ref 0–0)
PH UR: 8 — SIGNIFICANT CHANGE UP (ref 5–8)
PLATELET # BLD AUTO: 331 K/UL — SIGNIFICANT CHANGE UP (ref 130–400)
POTASSIUM SERPL-MCNC: 4.5 MMOL/L — SIGNIFICANT CHANGE UP (ref 3.5–5)
POTASSIUM SERPL-SCNC: 4.5 MMOL/L — SIGNIFICANT CHANGE UP (ref 3.5–5)
PROT SERPL-MCNC: 6.6 G/DL — SIGNIFICANT CHANGE UP (ref 6–8)
PROT UR-MCNC: SIGNIFICANT CHANGE UP
RBC # BLD: 3.77 M/UL — LOW (ref 4.2–5.4)
RBC # FLD: 14.9 % — HIGH (ref 11.5–14.5)
RBC CASTS # UR COMP ASSIST: 2 /HPF — SIGNIFICANT CHANGE UP (ref 0–4)
SARS-COV-2 RNA SPEC QL NAA+PROBE: SIGNIFICANT CHANGE UP
SODIUM SERPL-SCNC: 138 MMOL/L — SIGNIFICANT CHANGE UP (ref 135–146)
SP GR SPEC: 1.03 — HIGH (ref 1.01–1.03)
UROBILINOGEN FLD QL: SIGNIFICANT CHANGE UP
WBC # BLD: 9.22 K/UL — SIGNIFICANT CHANGE UP (ref 4.8–10.8)
WBC # FLD AUTO: 9.22 K/UL — SIGNIFICANT CHANGE UP (ref 4.8–10.8)
WBC UR QL: 4 /HPF — SIGNIFICANT CHANGE UP (ref 0–5)

## 2022-07-08 PROCEDURE — 74177 CT ABD & PELVIS W/CONTRAST: CPT | Mod: 26,MA

## 2022-07-08 PROCEDURE — 99285 EMERGENCY DEPT VISIT HI MDM: CPT | Mod: FS

## 2022-07-08 RX ORDER — ONDANSETRON 8 MG/1
8 TABLET, FILM COATED ORAL ONCE
Refills: 0 | Status: COMPLETED | OUTPATIENT
Start: 2022-07-08 | End: 2022-07-08

## 2022-07-08 RX ORDER — MORPHINE SULFATE 50 MG/1
4 CAPSULE, EXTENDED RELEASE ORAL ONCE
Refills: 0 | Status: DISCONTINUED | OUTPATIENT
Start: 2022-07-08 | End: 2022-07-08

## 2022-07-08 RX ORDER — DIATRIZOATE MEGLUMINE 180 MG/ML
30 INJECTION, SOLUTION INTRAVESICAL ONCE
Refills: 0 | Status: COMPLETED | OUTPATIENT
Start: 2022-07-08 | End: 2022-07-08

## 2022-07-08 RX ORDER — SODIUM CHLORIDE 9 MG/ML
1000 INJECTION INTRAMUSCULAR; INTRAVENOUS; SUBCUTANEOUS ONCE
Refills: 0 | Status: COMPLETED | OUTPATIENT
Start: 2022-07-08 | End: 2022-07-08

## 2022-07-08 RX ADMIN — DIATRIZOATE MEGLUMINE 30 MILLILITER(S): 180 INJECTION, SOLUTION INTRAVESICAL at 15:49

## 2022-07-08 RX ADMIN — ONDANSETRON 8 MILLIGRAM(S): 8 TABLET, FILM COATED ORAL at 21:23

## 2022-07-08 RX ADMIN — SODIUM CHLORIDE 1000 MILLILITER(S): 9 INJECTION INTRAMUSCULAR; INTRAVENOUS; SUBCUTANEOUS at 13:42

## 2022-07-08 RX ADMIN — ONDANSETRON 8 MILLIGRAM(S): 8 TABLET, FILM COATED ORAL at 13:42

## 2022-07-08 RX ADMIN — MORPHINE SULFATE 4 MILLIGRAM(S): 50 CAPSULE, EXTENDED RELEASE ORAL at 13:42

## 2022-07-08 RX ADMIN — MORPHINE SULFATE 4 MILLIGRAM(S): 50 CAPSULE, EXTENDED RELEASE ORAL at 23:40

## 2022-07-08 RX ADMIN — MORPHINE SULFATE 4 MILLIGRAM(S): 50 CAPSULE, EXTENDED RELEASE ORAL at 17:07

## 2022-07-08 RX ADMIN — MORPHINE SULFATE 4 MILLIGRAM(S): 50 CAPSULE, EXTENDED RELEASE ORAL at 16:59

## 2022-07-08 RX ADMIN — MORPHINE SULFATE 4 MILLIGRAM(S): 50 CAPSULE, EXTENDED RELEASE ORAL at 19:28

## 2022-07-08 NOTE — ED PROVIDER NOTE - ATTENDING APP SHARED VISIT CONTRIBUTION OF CARE
68 yo f with pmh of breast ca, rcc with partial resection of L kidney, spleen, pancreas, adrenal glad, presents with c/o abd pain and bloating.  pt admits multiple episodes of vomiting, unable to tolerate po.  had last bm this morning, small avril.  has not been passing gas.  denies fever or chills.  pt says had a ct last week and was told had "lot of gas in abd", but did not have pain at the time.  pt says pain only started last night.  no cp, no sob.  no urinary sx.  exam: nad, ncat, perrl, eomi, mmm, rrr, ctab, abd soft, ttp upper abd, +tympanic, distended, aox3, imp: pt with abd pain and bloating, extensive abd surg, will r/o sbo

## 2022-07-08 NOTE — ED PROVIDER NOTE - OBJECTIVE STATEMENT
67 year old female with a history of Breast Cancer s/p Lumpectomy in 2004 currently RAKESH, and more recently Renal Cell Carcinoma s/p left nephrectomy with debulking/whipple in 2019 presents to the ED with abdominal pain, nausea and vomiting. Abdominal pain came on suddenly last night diffuse throughout abdomen associated with belching and many episodes of vomiting. Patient has been unable to tolerate any PO. Denies Fever, chills, chest pain, shortness of breath, diarrhea, dysuria.

## 2022-07-08 NOTE — ED PROVIDER NOTE - NS ED ROS FT
Constitutional: (-) fever  Eyes/ENT: (-) blurry vision, (-) epistaxis  Cardiovascular: (-) chest pain, (-) syncope  Respiratory: (-) cough, (-) shortness of breath  Gastrointestinal: (+) abdominal pain (+) nausea (+) vomiting, (-) diarrhea  Musculoskeletal: (-) neck pain, (-) back pain, (-) joint pain  Integumentary: (-) rash, (-) edema  Neurological: (-) headache, (-) altered mental status  Psychiatric: (-) hallucinations  Allergic/Immunologic: (-) pruritus

## 2022-07-08 NOTE — ED PROVIDER NOTE - CLINICAL SUMMARY MEDICAL DECISION MAKING FREE TEXT BOX
Patient endorsed to me by Dr. Mccartney.  68 yo F w/ hx as documented p/w abd pain and vomiting. labs and imaging reviewed. admitted to surgical service for SBO.

## 2022-07-08 NOTE — ED PROVIDER NOTE - PHYSICAL EXAMINATION
Physical Exam    Constitutional: No acute distress. No active vomiting.  Eyes: Conjunctiva pink, Sclera clear, PERRLA, EOMI.  ENT: No sinus tenderness. No nasal discharge. No oropharyngeal erythema, edema, or exudates. Uvula midline. Dry mucous membranes.   Cardiovascular: Regular rate, regular rhythm. No noted murmurs rubs or gallops.  Respiratory: unlabored respiratory effort, clear to auscultation bilaterally no wheezing, rales or rhonchi  Gastrointestinal: Hypoactive bowel sounds. Soft, distended abdomen diffusely tender.   Musculoskeletal: supple neck. No joint or bony deformity.   Integumentary: warm, dry, no rash  Neurologic: awake, alert, cranial nerves II-XII grossly intact, extremities’ motor and sensory functions grossly intact  Psychiatric: appropriate mood, appropriate affect

## 2022-07-08 NOTE — ED ADULT NURSE NOTE - NSFALLRSKINDICTYPE_ED_ALL_ED
Writer contacted nurse, Ana and orders given verbally.  Writer also contacted patient and informed patient of the medication change.  Patient is agreeable with the plan and verbalized understanding.      Orders faxed to [phone]  Attn megan martínez.    Labs can be drawn at the assisted living if patient prefers.  Nurse informed and will discuss with patient.      Will await for lab results and message will be postponed as a reminder.    Alisa Shook RN  Cardiology Care Coordinator  [facility]  [phone] option 1           Sedation

## 2022-07-08 NOTE — ED PROVIDER NOTE - PROGRESS NOTE DETAILS
delay in disposition 2/2 pt undecided. surg rev'd goals of care with pt and she is unsure how she wishes to proceed. if surgery, she will get admitted to their service however if she opts for no surg given the likelihood this is a malignant SBO, she will get admitted to medicine and will then discuss getting c/s by palliative care

## 2022-07-09 DIAGNOSIS — Z98.890 OTHER SPECIFIED POSTPROCEDURAL STATES: Chronic | ICD-10-CM

## 2022-07-09 DIAGNOSIS — E89.6 POSTPROCEDURAL ADRENOCORTICAL (-MEDULLARY) HYPOFUNCTION: Chronic | ICD-10-CM

## 2022-07-09 DIAGNOSIS — Z90.5 ACQUIRED ABSENCE OF KIDNEY: Chronic | ICD-10-CM

## 2022-07-09 DIAGNOSIS — Z90.411 ACQUIRED PARTIAL ABSENCE OF PANCREAS: Chronic | ICD-10-CM

## 2022-07-09 DIAGNOSIS — Z90.81 ACQUIRED ABSENCE OF SPLEEN: Chronic | ICD-10-CM

## 2022-07-09 LAB
ANION GAP SERPL CALC-SCNC: 11 MMOL/L — SIGNIFICANT CHANGE UP (ref 7–14)
ANION GAP SERPL CALC-SCNC: 12 MMOL/L — SIGNIFICANT CHANGE UP (ref 7–14)
BASOPHILS # BLD AUTO: 0.02 K/UL — SIGNIFICANT CHANGE UP (ref 0–0.2)
BASOPHILS # BLD AUTO: 0.03 K/UL — SIGNIFICANT CHANGE UP (ref 0–0.2)
BASOPHILS NFR BLD AUTO: 0.3 % — SIGNIFICANT CHANGE UP (ref 0–1)
BASOPHILS NFR BLD AUTO: 0.4 % — SIGNIFICANT CHANGE UP (ref 0–1)
BUN SERPL-MCNC: 10 MG/DL — SIGNIFICANT CHANGE UP (ref 10–20)
BUN SERPL-MCNC: 12 MG/DL — SIGNIFICANT CHANGE UP (ref 10–20)
CALCIUM SERPL-MCNC: 8 MG/DL — LOW (ref 8.5–10.1)
CALCIUM SERPL-MCNC: 8.6 MG/DL — SIGNIFICANT CHANGE UP (ref 8.5–10.1)
CHLORIDE SERPL-SCNC: 98 MMOL/L — SIGNIFICANT CHANGE UP (ref 98–110)
CHLORIDE SERPL-SCNC: 99 MMOL/L — SIGNIFICANT CHANGE UP (ref 98–110)
CO2 SERPL-SCNC: 25 MMOL/L — SIGNIFICANT CHANGE UP (ref 17–32)
CO2 SERPL-SCNC: 27 MMOL/L — SIGNIFICANT CHANGE UP (ref 17–32)
CREAT SERPL-MCNC: 0.9 MG/DL — SIGNIFICANT CHANGE UP (ref 0.7–1.5)
CREAT SERPL-MCNC: 1 MG/DL — SIGNIFICANT CHANGE UP (ref 0.7–1.5)
EGFR: 62 ML/MIN/1.73M2 — SIGNIFICANT CHANGE UP
EGFR: 71 ML/MIN/1.73M2 — SIGNIFICANT CHANGE UP
EOSINOPHIL # BLD AUTO: 0.06 K/UL — SIGNIFICANT CHANGE UP (ref 0–0.7)
EOSINOPHIL # BLD AUTO: 0.06 K/UL — SIGNIFICANT CHANGE UP (ref 0–0.7)
EOSINOPHIL NFR BLD AUTO: 0.8 % — SIGNIFICANT CHANGE UP (ref 0–8)
EOSINOPHIL NFR BLD AUTO: 0.8 % — SIGNIFICANT CHANGE UP (ref 0–8)
GLUCOSE SERPL-MCNC: 85 MG/DL — SIGNIFICANT CHANGE UP (ref 70–99)
GLUCOSE SERPL-MCNC: 94 MG/DL — SIGNIFICANT CHANGE UP (ref 70–99)
HCT VFR BLD CALC: 31 % — LOW (ref 37–47)
HCT VFR BLD CALC: 33 % — LOW (ref 37–47)
HCV AB S/CO SERPL IA: 0.04 COI — SIGNIFICANT CHANGE UP
HCV AB SERPL-IMP: SIGNIFICANT CHANGE UP
HGB BLD-MCNC: 10.3 G/DL — LOW (ref 12–16)
HGB BLD-MCNC: 11 G/DL — LOW (ref 12–16)
IMM GRANULOCYTES NFR BLD AUTO: 0.1 % — SIGNIFICANT CHANGE UP (ref 0.1–0.3)
IMM GRANULOCYTES NFR BLD AUTO: 0.3 % — SIGNIFICANT CHANGE UP (ref 0.1–0.3)
LYMPHOCYTES # BLD AUTO: 0.96 K/UL — LOW (ref 1.2–3.4)
LYMPHOCYTES # BLD AUTO: 1.17 K/UL — LOW (ref 1.2–3.4)
LYMPHOCYTES # BLD AUTO: 13.2 % — LOW (ref 20.5–51.1)
LYMPHOCYTES # BLD AUTO: 16 % — LOW (ref 20.5–51.1)
MAGNESIUM SERPL-MCNC: 1.6 MG/DL — LOW (ref 1.8–2.4)
MAGNESIUM SERPL-MCNC: 2.1 MG/DL — SIGNIFICANT CHANGE UP (ref 1.8–2.4)
MCHC RBC-ENTMCNC: 33.2 G/DL — SIGNIFICANT CHANGE UP (ref 32–37)
MCHC RBC-ENTMCNC: 33.3 G/DL — SIGNIFICANT CHANGE UP (ref 32–37)
MCHC RBC-ENTMCNC: 33.3 PG — HIGH (ref 27–31)
MCHC RBC-ENTMCNC: 33.6 PG — HIGH (ref 27–31)
MCV RBC AUTO: 100 FL — HIGH (ref 81–99)
MCV RBC AUTO: 101 FL — HIGH (ref 81–99)
MONOCYTES # BLD AUTO: 1.2 K/UL — HIGH (ref 0.1–0.6)
MONOCYTES # BLD AUTO: 1.31 K/UL — HIGH (ref 0.1–0.6)
MONOCYTES NFR BLD AUTO: 16.4 % — HIGH (ref 1.7–9.3)
MONOCYTES NFR BLD AUTO: 18 % — HIGH (ref 1.7–9.3)
NEUTROPHILS # BLD AUTO: 4.83 K/UL — SIGNIFICANT CHANGE UP (ref 1.4–6.5)
NEUTROPHILS # BLD AUTO: 4.89 K/UL — SIGNIFICANT CHANGE UP (ref 1.4–6.5)
NEUTROPHILS NFR BLD AUTO: 66.2 % — SIGNIFICANT CHANGE UP (ref 42.2–75.2)
NEUTROPHILS NFR BLD AUTO: 67.5 % — SIGNIFICANT CHANGE UP (ref 42.2–75.2)
NRBC # BLD: 0 /100 WBCS — SIGNIFICANT CHANGE UP (ref 0–0)
NRBC # BLD: 0 /100 WBCS — SIGNIFICANT CHANGE UP (ref 0–0)
PHOSPHATE SERPL-MCNC: 2.9 MG/DL — SIGNIFICANT CHANGE UP (ref 2.1–4.9)
PHOSPHATE SERPL-MCNC: 3.7 MG/DL — SIGNIFICANT CHANGE UP (ref 2.1–4.9)
PLATELET # BLD AUTO: 292 K/UL — SIGNIFICANT CHANGE UP (ref 130–400)
PLATELET # BLD AUTO: 292 K/UL — SIGNIFICANT CHANGE UP (ref 130–400)
POTASSIUM SERPL-MCNC: 4.2 MMOL/L — SIGNIFICANT CHANGE UP (ref 3.5–5)
POTASSIUM SERPL-MCNC: 5 MMOL/L — SIGNIFICANT CHANGE UP (ref 3.5–5)
POTASSIUM SERPL-SCNC: 4.2 MMOL/L — SIGNIFICANT CHANGE UP (ref 3.5–5)
POTASSIUM SERPL-SCNC: 5 MMOL/L — SIGNIFICANT CHANGE UP (ref 3.5–5)
RBC # BLD: 3.07 M/UL — LOW (ref 4.2–5.4)
RBC # BLD: 3.3 M/UL — LOW (ref 4.2–5.4)
RBC # FLD: 14.9 % — HIGH (ref 11.5–14.5)
RBC # FLD: 15 % — HIGH (ref 11.5–14.5)
SODIUM SERPL-SCNC: 135 MMOL/L — SIGNIFICANT CHANGE UP (ref 135–146)
SODIUM SERPL-SCNC: 137 MMOL/L — SIGNIFICANT CHANGE UP (ref 135–146)
WBC # BLD: 7.26 K/UL — SIGNIFICANT CHANGE UP (ref 4.8–10.8)
WBC # BLD: 7.3 K/UL — SIGNIFICANT CHANGE UP (ref 4.8–10.8)
WBC # FLD AUTO: 7.26 K/UL — SIGNIFICANT CHANGE UP (ref 4.8–10.8)
WBC # FLD AUTO: 7.3 K/UL — SIGNIFICANT CHANGE UP (ref 4.8–10.8)

## 2022-07-09 PROCEDURE — 74021 RADEX ABDOMEN 3+ VIEWS: CPT | Mod: 26

## 2022-07-09 PROCEDURE — 99223 1ST HOSP IP/OBS HIGH 75: CPT

## 2022-07-09 RX ORDER — ENOXAPARIN SODIUM 100 MG/ML
40 INJECTION SUBCUTANEOUS EVERY 24 HOURS
Refills: 0 | Status: DISCONTINUED | OUTPATIENT
Start: 2022-07-09 | End: 2022-07-09

## 2022-07-09 RX ORDER — PANTOPRAZOLE SODIUM 20 MG/1
40 TABLET, DELAYED RELEASE ORAL DAILY
Refills: 0 | Status: DISCONTINUED | OUTPATIENT
Start: 2022-07-09 | End: 2022-07-13

## 2022-07-09 RX ORDER — SODIUM CHLORIDE 9 MG/ML
1000 INJECTION, SOLUTION INTRAVENOUS
Refills: 0 | Status: DISCONTINUED | OUTPATIENT
Start: 2022-07-09 | End: 2022-07-12

## 2022-07-09 RX ORDER — MAGNESIUM SULFATE 500 MG/ML
2 VIAL (ML) INJECTION ONCE
Refills: 0 | Status: COMPLETED | OUTPATIENT
Start: 2022-07-09 | End: 2022-07-09

## 2022-07-09 RX ORDER — KETOROLAC TROMETHAMINE 30 MG/ML
15 SYRINGE (ML) INJECTION EVERY 6 HOURS
Refills: 0 | Status: DISCONTINUED | OUTPATIENT
Start: 2022-07-09 | End: 2022-07-13

## 2022-07-09 RX ORDER — ENOXAPARIN SODIUM 100 MG/ML
50 INJECTION SUBCUTANEOUS EVERY 12 HOURS
Refills: 0 | Status: DISCONTINUED | OUTPATIENT
Start: 2022-07-09 | End: 2022-07-12

## 2022-07-09 RX ORDER — LEVOTHYROXINE SODIUM 125 MCG
12.5 TABLET ORAL AT BEDTIME
Refills: 0 | Status: DISCONTINUED | OUTPATIENT
Start: 2022-07-09 | End: 2022-07-10

## 2022-07-09 RX ORDER — HYDROMORPHONE HYDROCHLORIDE 2 MG/ML
0.5 INJECTION INTRAMUSCULAR; INTRAVENOUS; SUBCUTANEOUS EVERY 6 HOURS
Refills: 0 | Status: DISCONTINUED | OUTPATIENT
Start: 2022-07-09 | End: 2022-07-13

## 2022-07-09 RX ORDER — CHLORHEXIDINE GLUCONATE 213 G/1000ML
1 SOLUTION TOPICAL
Refills: 0 | Status: DISCONTINUED | OUTPATIENT
Start: 2022-07-09 | End: 2022-07-13

## 2022-07-09 RX ADMIN — MORPHINE SULFATE 4 MILLIGRAM(S): 50 CAPSULE, EXTENDED RELEASE ORAL at 01:56

## 2022-07-09 RX ADMIN — PANTOPRAZOLE SODIUM 40 MILLIGRAM(S): 20 TABLET, DELAYED RELEASE ORAL at 11:44

## 2022-07-09 RX ADMIN — Medication 25 GRAM(S): at 11:34

## 2022-07-09 RX ADMIN — Medication 12.5 MICROGRAM(S): at 22:21

## 2022-07-09 RX ADMIN — Medication 25 GRAM(S): at 02:15

## 2022-07-09 RX ADMIN — ENOXAPARIN SODIUM 50 MILLIGRAM(S): 100 INJECTION SUBCUTANEOUS at 17:18

## 2022-07-09 NOTE — H&P ADULT - NSICDXPASTSURGICALHX_GEN_ALL_CORE_FT
PAST SURGICAL HISTORY:  H/O left nephrectomy     H/O lumpectomy     H/O partial adrenalectomy     History of partial pancreatectomy     S/P splenectomy

## 2022-07-09 NOTE — H&P ADULT - ASSESSMENT
ASSESSMENT:  67F w/ PMH of hypothyroidism, DVT on eliquis, s/p R lumpectomy (she reports non-cancerous), metastatic renal cell carcinoma s/p L nephrectomy, adrenalectomy, distal pancreatectomy, and splenectomy on chemotherapy presented to the ED with abdominal pain, nausea, and vomiting. Physical exam findings, imaging, and labs as documented above.     PLAN:  - admit to general surgery under Dr. Paredes  - continue NGT to LCWS, strict I/Os  - NPO, IVF  - monitor for bowel function, abdominal pain  - FU obstructive series in afternoon later today  - PMH DVT on eliquis: holding    Lines/Tubes: PIV    Above plan discussed with Attending Surgeon Dr. Paredes, patient, patient family, and Primary team  07-09-22 @ 01:12 ASSESSMENT:  67F w/ PMH of hypothyroidism, DVT on eliquis, s/p R lumpectomy (she reports non-cancerous), metastatic renal cell carcinoma s/p L nephrectomy, adrenalectomy, distal pancreatectomy, and splenectomy on chemotherapy presented to the ED with abdominal pain, nausea, and vomiting. Physical exam findings, imaging, and labs as documented above.     PLAN:  - admit to general surgery under Dr. Paredes  - continue NGT to LCWS, strict I/Os  - NPO, IVF  - monitor for bowel function, abdominal pain  - FU obstructive series in afternoon later today  - PMH DVT on eliquis: holding  - RCC on chemotherapy: switched from cabometyx to everolimus and levima yesterday, but has not started them; holding    Lines/Tubes: PIV    Above plan discussed with Attending Surgeon Dr. Paredes, patient, patient family, and Primary team  07-09-22 @ 01:12

## 2022-07-09 NOTE — PATIENT PROFILE ADULT - FALL HARM RISK - HARM RISK INTERVENTIONS

## 2022-07-09 NOTE — H&P ADULT - ATTENDING COMMENTS
patient seen and evaluated. has partial SBO secondary to adhesions. admit to hospital, npo, iv fluids.

## 2022-07-09 NOTE — H&P ADULT - NSHPLABSRESULTS_GEN_ALL_CORE
LAB/STUDIES:                        12.4     )-----------( 331      ( 2022 13:36 )             37.1     -    138  |  99  |  14  ----------------------------<  100<H>  4.5   |  25  |  1.0    Ca    9.5      2022 13:36  Mg     1.7     -08    TPro  6.6  /  Alb  3.9  /  TBili  0.4  /  DBili  <0.2  /  AST  30  /  ALT  26  /  AlkPhos  85  07-08    LIVER FUNCTIONS - ( 2022 13:36 )  Alb: 3.9 g/dL / Pro: 6.6 g/dL / ALK PHOS: 85 U/L / ALT: 26 U/L / AST: 30 U/L / GGT: x           Urinalysis Basic - ( 2022 18:41 )    Color: Light Yellow / Appearance: Clear / S.033 / pH: x  Gluc: x / Ketone: Negative  / Bili: Negative / Urobili: <2 mg/dL   Blood: x / Protein: Trace / Nitrite: Negative   Leuk Esterase: Small / RBC: 2 /HPF / WBC 4 /HPF   Sq Epi: x / Non Sq Epi: 5 /HPF / Bacteria: Negative      IMAGING:  < from: CT Abdomen and Pelvis w/ Oral Cont and w/ IV Cont (22 @ 17:44) >  FINDINGS:  LOWER CHEST: Bibasilar dependent atelectasis.    HEPATOBILIARY: Unchanged postsurgical changes of posterior right hepatic   lobe. Unchanged scattered rim-enhancing hypodensities. No intrahepatic   biliary ductal dilatation.    SPLEEN: Post splenectomy.    PANCREAS: Unremarkable.    ADRENAL GLANDS: Unchanged mild thickening of right adrenal gland. Post   left adrenalectomy.    KIDNEYS: Post left nephrectomy. No new mass within the surgical bed. No   right hydronephrosis.    ABDOMINOPELVIC NODES: Unremarkable.    PELVIC ORGANS: Moderately distended urinary bladder.    PERITONEUM/MESENTERY/BOWEL: Mild/moderately distended small bowel loops   with transition point at mid left abdomen (4-168). Additional distal   loops of fecalized small bowel with decrease in caliber at anterior mid   abdomen (4-157), where the small bowel wall appears concentrically   thickened. Normal free air. Mild volume intra-abdominal ascites. The SMV   and SMA are patent    BONES/SOFT TISSUES: Interval resolution of anterior abdominal wall cystic   structure. Unchanged size and appearance of anterior abdominal wall and   right breast nodules. Interval increase in size of 4.8 x 3.9 cm left   sacral soft tissue mass that is eroding through anterior sacrum.   Unchanged 1.3 cm T11 vertebral body sclerotic lesion (4-49).    OTHER: Normal caliber abdominal aorta.    IMPRESSION:  Mild/moderately distended small bowel loops with transition point at mid   left abdomen (4-168). Additional distal loops of fecalized small bowel   with decrease in caliber at anterior mid abdomen (4-157), where the small   bowel wall appears concentrically thickened. Findings compatible with   small bowel obstruction or partial small bowel obstructive pattern.  Follow-up with plain abdominal x-ray to assess passage of contrast into   the colon may be helpful.  Interval increase in size of 4.8 x 3.9 cm left sacral soft tissue mass   that is eroding through anterior sacrum.    Interval resolution of anterior abdominal wall cystic structure.  < end of copied text >      ACCESS DEVICES:  [X] Peripheral IV

## 2022-07-09 NOTE — H&P ADULT - HISTORY OF PRESENT ILLNESS
GENERAL SURGERY CONSULT NOTE    Patient: PARUL SOTO , 67y ([birth year])Female   MRN: [id number]  Location: [facility] ER Hold 020 A  Visit: 07-09-22 Inpatient  Date: 07-09-22 @ 01:12    HPI:  67F w/ PMH of hypothyroidism, DVT on eliquis, s/p R lumpectomy (she reports non-cancerous), metastatic renal cell carcinoma s/p L nephrectomy, adrenalectomy, distal pancreatectomy, and splenectomy on chemotherapy presented to the ED with abdominal pain, nausea, and vomiting. Pt reports her pain started around 3am in the morning and progressively worsened. She describes the pain as diffuse, but worst in the epigastrium/midline stomach. Reports inability to tolerate PO and abdominal distension. Last BM was earlier today, small. Denies flatus today. In the ED, CT showed: Mild/moderately distended small bowel loops with transition point at mid left abdomen. Additional distal loops of fecalized small bowel with decrease in caliber at anterior mid abdomen, where the small bowel wall appears concentrically thickened. Findings compatible with small bowel obstruction or partial small bowel obstructive pattern. NGT was placed with ~550cc output.

## 2022-07-09 NOTE — H&P ADULT - NSHPPHYSICALEXAM_GEN_ALL_CORE
VITALS:  T(F): 97.5 (07-08-22 @ 18:28), Max: 98.8 (07-08-22 @ 12:14)  HR: 80 (07-09-22 @ 00:57) (75 - 80)  BP: 93/51 (07-09-22 @ 00:57) (89/50 - 132/75)  RR: 18 (07-09-22 @ 00:57) (18 - 19)  SpO2: 97% (07-09-22 @ 00:57) (96% - 98%)    PHYSICAL EXAM:  General: NAD, AAOx3, calm and cooperative  HEENT: NCAT, TIM, EOMI, Trachea ML, Neck supple  Respiratory: normal respiratory effort  Abdomen: Soft, mildly distended, slightly tender in epigastrium/LUQ, no rebound, no guarding  Skin: Warm/dry, normal color, no jaundice

## 2022-07-10 LAB
ANION GAP SERPL CALC-SCNC: 10 MMOL/L — SIGNIFICANT CHANGE UP (ref 7–14)
BASOPHILS # BLD AUTO: 0.03 K/UL — SIGNIFICANT CHANGE UP (ref 0–0.2)
BASOPHILS NFR BLD AUTO: 0.5 % — SIGNIFICANT CHANGE UP (ref 0–1)
BUN SERPL-MCNC: 8 MG/DL — LOW (ref 10–20)
CALCIUM SERPL-MCNC: 7.8 MG/DL — LOW (ref 8.5–10.1)
CHLORIDE SERPL-SCNC: 104 MMOL/L — SIGNIFICANT CHANGE UP (ref 98–110)
CO2 SERPL-SCNC: 26 MMOL/L — SIGNIFICANT CHANGE UP (ref 17–32)
CREAT SERPL-MCNC: 0.9 MG/DL — SIGNIFICANT CHANGE UP (ref 0.7–1.5)
EGFR: 71 ML/MIN/1.73M2 — SIGNIFICANT CHANGE UP
EOSINOPHIL # BLD AUTO: 0.12 K/UL — SIGNIFICANT CHANGE UP (ref 0–0.7)
EOSINOPHIL NFR BLD AUTO: 1.8 % — SIGNIFICANT CHANGE UP (ref 0–8)
GLUCOSE SERPL-MCNC: 80 MG/DL — SIGNIFICANT CHANGE UP (ref 70–99)
HCT VFR BLD CALC: 30.9 % — LOW (ref 37–47)
HGB BLD-MCNC: 9.9 G/DL — LOW (ref 12–16)
IMM GRANULOCYTES NFR BLD AUTO: 0.3 % — SIGNIFICANT CHANGE UP (ref 0.1–0.3)
LYMPHOCYTES # BLD AUTO: 1.32 K/UL — SIGNIFICANT CHANGE UP (ref 1.2–3.4)
LYMPHOCYTES # BLD AUTO: 20.3 % — LOW (ref 20.5–51.1)
MAGNESIUM SERPL-MCNC: 2.2 MG/DL — SIGNIFICANT CHANGE UP (ref 1.8–2.4)
MCHC RBC-ENTMCNC: 32 G/DL — SIGNIFICANT CHANGE UP (ref 32–37)
MCHC RBC-ENTMCNC: 32.6 PG — HIGH (ref 27–31)
MCV RBC AUTO: 101.6 FL — HIGH (ref 81–99)
MONOCYTES # BLD AUTO: 1.13 K/UL — HIGH (ref 0.1–0.6)
MONOCYTES NFR BLD AUTO: 17.4 % — HIGH (ref 1.7–9.3)
NEUTROPHILS # BLD AUTO: 3.87 K/UL — SIGNIFICANT CHANGE UP (ref 1.4–6.5)
NEUTROPHILS NFR BLD AUTO: 59.7 % — SIGNIFICANT CHANGE UP (ref 42.2–75.2)
NRBC # BLD: 0 /100 WBCS — SIGNIFICANT CHANGE UP (ref 0–0)
PHOSPHATE SERPL-MCNC: 2.4 MG/DL — SIGNIFICANT CHANGE UP (ref 2.1–4.9)
PLATELET # BLD AUTO: 290 K/UL — SIGNIFICANT CHANGE UP (ref 130–400)
POTASSIUM SERPL-MCNC: 4.8 MMOL/L — SIGNIFICANT CHANGE UP (ref 3.5–5)
POTASSIUM SERPL-SCNC: 4.8 MMOL/L — SIGNIFICANT CHANGE UP (ref 3.5–5)
RBC # BLD: 3.04 M/UL — LOW (ref 4.2–5.4)
RBC # FLD: 14.6 % — HIGH (ref 11.5–14.5)
SODIUM SERPL-SCNC: 140 MMOL/L — SIGNIFICANT CHANGE UP (ref 135–146)
WBC # BLD: 6.49 K/UL — SIGNIFICANT CHANGE UP (ref 4.8–10.8)
WBC # FLD AUTO: 6.49 K/UL — SIGNIFICANT CHANGE UP (ref 4.8–10.8)

## 2022-07-10 RX ORDER — LEVOTHYROXINE SODIUM 125 MCG
25 TABLET ORAL AT BEDTIME
Refills: 0 | Status: DISCONTINUED | OUTPATIENT
Start: 2022-07-10 | End: 2022-07-10

## 2022-07-10 RX ORDER — LEVOTHYROXINE SODIUM 125 MCG
25 TABLET ORAL DAILY
Refills: 0 | Status: DISCONTINUED | OUTPATIENT
Start: 2022-07-10 | End: 2022-07-13

## 2022-07-10 RX ADMIN — PANTOPRAZOLE SODIUM 40 MILLIGRAM(S): 20 TABLET, DELAYED RELEASE ORAL at 11:35

## 2022-07-10 RX ADMIN — ENOXAPARIN SODIUM 50 MILLIGRAM(S): 100 INJECTION SUBCUTANEOUS at 05:29

## 2022-07-10 RX ADMIN — SODIUM CHLORIDE 75 MILLILITER(S): 9 INJECTION, SOLUTION INTRAVENOUS at 00:05

## 2022-07-10 RX ADMIN — SODIUM CHLORIDE 75 MILLILITER(S): 9 INJECTION, SOLUTION INTRAVENOUS at 17:14

## 2022-07-10 RX ADMIN — CHLORHEXIDINE GLUCONATE 1 APPLICATION(S): 213 SOLUTION TOPICAL at 05:27

## 2022-07-10 RX ADMIN — ENOXAPARIN SODIUM 50 MILLIGRAM(S): 100 INJECTION SUBCUTANEOUS at 17:14

## 2022-07-11 LAB
ANION GAP SERPL CALC-SCNC: 11 MMOL/L — SIGNIFICANT CHANGE UP (ref 7–14)
BASOPHILS # BLD AUTO: 0.02 K/UL — SIGNIFICANT CHANGE UP (ref 0–0.2)
BASOPHILS NFR BLD AUTO: 0.3 % — SIGNIFICANT CHANGE UP (ref 0–1)
BUN SERPL-MCNC: 8 MG/DL — LOW (ref 10–20)
CALCIUM SERPL-MCNC: 7.7 MG/DL — LOW (ref 8.5–10.1)
CHLORIDE SERPL-SCNC: 103 MMOL/L — SIGNIFICANT CHANGE UP (ref 98–110)
CO2 SERPL-SCNC: 23 MMOL/L — SIGNIFICANT CHANGE UP (ref 17–32)
CREAT SERPL-MCNC: 0.8 MG/DL — SIGNIFICANT CHANGE UP (ref 0.7–1.5)
EGFR: 81 ML/MIN/1.73M2 — SIGNIFICANT CHANGE UP
EOSINOPHIL # BLD AUTO: 0.12 K/UL — SIGNIFICANT CHANGE UP (ref 0–0.7)
EOSINOPHIL NFR BLD AUTO: 1.9 % — SIGNIFICANT CHANGE UP (ref 0–8)
GLUCOSE SERPL-MCNC: 102 MG/DL — HIGH (ref 70–99)
HCT VFR BLD CALC: 32.7 % — LOW (ref 37–47)
HGB BLD-MCNC: 10.4 G/DL — LOW (ref 12–16)
IMM GRANULOCYTES NFR BLD AUTO: 0.2 % — SIGNIFICANT CHANGE UP (ref 0.1–0.3)
LYMPHOCYTES # BLD AUTO: 1.37 K/UL — SIGNIFICANT CHANGE UP (ref 1.2–3.4)
LYMPHOCYTES # BLD AUTO: 21.9 % — SIGNIFICANT CHANGE UP (ref 20.5–51.1)
MAGNESIUM SERPL-MCNC: 2.1 MG/DL — SIGNIFICANT CHANGE UP (ref 1.8–2.4)
MCHC RBC-ENTMCNC: 31.8 G/DL — LOW (ref 32–37)
MCHC RBC-ENTMCNC: 32.9 PG — HIGH (ref 27–31)
MCV RBC AUTO: 103.5 FL — HIGH (ref 81–99)
MONOCYTES # BLD AUTO: 1.2 K/UL — HIGH (ref 0.1–0.6)
MONOCYTES NFR BLD AUTO: 19.1 % — HIGH (ref 1.7–9.3)
NEUTROPHILS # BLD AUTO: 3.55 K/UL — SIGNIFICANT CHANGE UP (ref 1.4–6.5)
NEUTROPHILS NFR BLD AUTO: 56.6 % — SIGNIFICANT CHANGE UP (ref 42.2–75.2)
NRBC # BLD: 0 /100 WBCS — SIGNIFICANT CHANGE UP (ref 0–0)
PHOSPHATE SERPL-MCNC: 2.1 MG/DL — SIGNIFICANT CHANGE UP (ref 2.1–4.9)
PLATELET # BLD AUTO: 205 K/UL — SIGNIFICANT CHANGE UP (ref 130–400)
POTASSIUM SERPL-MCNC: 4.5 MMOL/L — SIGNIFICANT CHANGE UP (ref 3.5–5)
POTASSIUM SERPL-SCNC: 4.5 MMOL/L — SIGNIFICANT CHANGE UP (ref 3.5–5)
RBC # BLD: 3.16 M/UL — LOW (ref 4.2–5.4)
RBC # FLD: 14.6 % — HIGH (ref 11.5–14.5)
SODIUM SERPL-SCNC: 137 MMOL/L — SIGNIFICANT CHANGE UP (ref 135–146)
WBC # BLD: 6.27 K/UL — SIGNIFICANT CHANGE UP (ref 4.8–10.8)
WBC # FLD AUTO: 6.27 K/UL — SIGNIFICANT CHANGE UP (ref 4.8–10.8)

## 2022-07-11 PROCEDURE — 99232 SBSQ HOSP IP/OBS MODERATE 35: CPT

## 2022-07-11 RX ADMIN — ENOXAPARIN SODIUM 50 MILLIGRAM(S): 100 INJECTION SUBCUTANEOUS at 17:43

## 2022-07-11 RX ADMIN — ENOXAPARIN SODIUM 50 MILLIGRAM(S): 100 INJECTION SUBCUTANEOUS at 05:39

## 2022-07-11 RX ADMIN — Medication 25 MICROGRAM(S): at 05:38

## 2022-07-11 RX ADMIN — PANTOPRAZOLE SODIUM 40 MILLIGRAM(S): 20 TABLET, DELAYED RELEASE ORAL at 11:04

## 2022-07-12 PROCEDURE — 99232 SBSQ HOSP IP/OBS MODERATE 35: CPT

## 2022-07-12 RX ORDER — APIXABAN 2.5 MG/1
2.5 TABLET, FILM COATED ORAL EVERY 12 HOURS
Refills: 0 | Status: DISCONTINUED | OUTPATIENT
Start: 2022-07-12 | End: 2022-07-13

## 2022-07-12 RX ADMIN — PANTOPRAZOLE SODIUM 40 MILLIGRAM(S): 20 TABLET, DELAYED RELEASE ORAL at 11:44

## 2022-07-12 RX ADMIN — Medication 25 MICROGRAM(S): at 05:18

## 2022-07-12 RX ADMIN — Medication 85 MILLIMOLE(S): at 11:44

## 2022-07-12 RX ADMIN — ENOXAPARIN SODIUM 50 MILLIGRAM(S): 100 INJECTION SUBCUTANEOUS at 05:19

## 2022-07-12 RX ADMIN — APIXABAN 2.5 MILLIGRAM(S): 2.5 TABLET, FILM COATED ORAL at 18:42

## 2022-07-12 NOTE — PROGRESS NOTE ADULT - ATTENDING COMMENTS
NG is out, denies abdominal pain. passing gas. xray shows contrast in colon. abdomen soft, not tender., not distended. continue liquids this morning and advance to regular diet for dinner if continues to do well.
passing gas, denies nausea, eating ok, abdomen soft.

## 2022-07-12 NOTE — PROGRESS NOTE ADULT - ASSESSMENT
Assessment and Plan:   · Assessment	  67F w/ PMH of hypothyroidism, DVT on eliquis, s/p R lumpectomy (she reports non-cancerous), metastatic renal cell carcinoma s/p L nephrectomy, adrenalectomy, distal pancreatectomy, and splenectomy on chemotherapy presented to the ED with abdominal pain, nausea, and vomiting. Physical exam findings, imaging, and labs as documented above.     PLAN:    -Continue Regular diet  -Patient intended prepared for d/c  -Elliquis restarted  - RCC on chemotherapy: switched from cabometyx to everolimus and levima yesterday, but has not started them; holding    spectra [phone]  
    Assessment and Plan:   · Assessment	  67F w/ PMH of hypothyroidism, DVT on eliquis, s/p R lumpectomy (she reports non-cancerous), metastatic renal cell carcinoma s/p L nephrectomy, adrenalectomy, distal pancreatectomy, and splenectomy on chemotherapy presented to the ED with abdominal pain, nausea, and vomiting. Physical exam findings, imaging, and labs as documented above.     PLAN:  -Patient tolerating CLD, progress as tolerated  -Continue to monitor abdominal exam and BM  - PMH DVT on eliquis holding, started on lovenox  - RCC on chemotherapy: switched from cabometyx to everolimus and levima yesterday, but has not started them; holding  -Synthroid PO    spectra [phone]
67F w/ PMH of hypothyroidism, DVT on eliquis, s/p R lumpectomy (she reports non-cancerous), metastatic renal cell carcinoma s/p L nephrectomy, adrenalectomy, distal pancreatectomy, and splenectomy on chemotherapy presented to the ED with abdominal pain, nausea, and vomiting. Physical exam findings, imaging, and labs as documented above.     PLAN:  - admit to general surgery under Dr. Paredes  - continue NGT to LCWS, strict I/Os  - NPO, IVF  - monitor for bowel function, abdominal pain  - PMH DVT on eliquis holding, started on lovenox  - RCC on chemotherapy: switched from cabometyx to everolimus and levima yesterday, but has not started them; holding    spectra [phone]

## 2022-07-12 NOTE — PROGRESS NOTE ADULT - SUBJECTIVE AND OBJECTIVE BOX
GENERAL SURGERY PROGRESS NOTE    Patient: PARUL SOTO , 67y ([birth year])Female   MRN: [id number]  Location: [address]  Visit: 07-09-22 Inpatient  Date: 07-12-22 @ 16:15    Hospital Day #:5  Post-Op Day #: N/A    Procedure/Dx/Injuries: SBO    Events of past 24 hours:  -Patient seen and examined at bedside  -The patient reports flatus but no BM, the patient is non-tender  -Phosphorus repleted  -Tolerating diet  -Patient intended for discharge  -Home elliquis restarted        PAST MEDICAL & SURGICAL HISTORY:  Renal cell carcinoma  H/O left nephrectomy  H/O partial adrenalectomy  History of partial pancreatectomy  S/P splenectomy  H/O lumpectomy    Vitals:   T(F): 97 (07-12-22 @ 13:50), Max: 97.6 (07-12-22 @ 00:44)  HR: 73 (07-12-22 @ 13:50)  BP: 97/54 (07-12-22 @ 13:50)  RR: 18 (07-12-22 @ 13:50)  SpO2: 100% (07-12-22 @ 13:50)      Diet, Regular  Fluids:   I & O's  07-11-22 @ 07:01  -  07-12-22 @ 07:00  --------------------------------------------------------  IN:    Lactated Ringers: 300 mL  Total IN: 300 mL    OUT:  Total OUT: 0 mL    Total NET: 300 mL      PHYSICAL EXAM:  General Appearance: AAOX3, NAD  Heart: RRR  Lungs: CTAx2  Abdomen:  soft, non tender, non distended  MSK/Extremities:  mobile    MEDICATIONS  (STANDING):  apixaban 2.5 milliGRAM(s) Oral every 12 hours  chlorhexidine 2% Cloths 1 Application(s) Topical <User Schedule>  levothyroxine 25 MICROGram(s) Oral daily  pantoprazole  Injectable 40 milliGRAM(s) IV Push daily    MEDICATIONS  (PRN):  HYDROmorphone  Injectable 0.5 milliGRAM(s) IV Push every 6 hours PRN Severe Pain (7 - 10)  ketorolac   Injectable 15 milliGRAM(s) IV Push every 6 hours PRN Moderate Pain (4 - 6)      DVT PROPHYLAXIS:   GI PROPHYLAXIS: pantoprazole  Injectable 40 milliGRAM(s) IV Push daily    LAB/STUDIES:  Labs:  CAPILLARY BLOOD GLUCOSE                              10.4   6.27  )-----------( 205      ( 11 Jul 2022 22:50 )             32.7       Auto Neutrophil %: 56.6 % (07-11-22 @ 22:50)  Auto Immature Granulocyte %: 0.2 % (07-11-22 @ 22:50)    07-11    137  |  103  |  8<L>  ----------------------------<  102<H>  4.5   |  23  |  0.8      Calcium, Total Serum: 7.7 mg/dL (07-11-22 @ 22:50)      LFTs:         Coags:  
GENERAL SURGERY PROGRESS NOTE    Patient: PARUL SOTO , 67y ([birth year])Female   MRN: [id number]  Location: [address]  Visit: 07-09-22 Inpatient  Date: 07-11-22 @ 00:18    Hospital Day #:3  Post-Op Day #: N/A    Procedure/Dx/Injuries: SBO     Events of past 24 hours:  -Patient seen and examined at bedside  -NG put out 500-600 on initial insertion, overnight output slowed and put out 250 overnight.   -NGT was pulled out during day shift and patient tolerated procedure well  -Patient tolerating CLD well   -Patient passing flatus but has yet to have BM  -Patient feels better and reports less distention and discomfort, non tender on exam    PAST MEDICAL & SURGICAL HISTORY:  Renal cell carcinoma  H/O left nephrectomy  H/O partial adrenalectomy  History of partial pancreatectomy  S/P splenectomy  H/O lumpectomy    Vitals:   T(F): 97.8 (07-10-22 @ 16:00), Max: 98.6 (07-10-22 @ 09:30)  HR: 67 (07-10-22 @ 16:00)  BP: 113/53 (07-10-22 @ 16:00)  RR: 18 (07-10-22 @ 09:30)  SpO2: --    Diet, Clear Liquid    Fluids:     I & O's:    07-09-22 @ 07:01  -  07-10-22 @ 07:00  --------------------------------------------------------  IN:    Lactated Ringers: 525 mL  Total IN: 525 mL    OUT:    Nasogastric/Oral tube (mL): 250 mL  Total OUT: 250 mL    Total NET: 275 mL    PHYSICAL EXAM:  General Appearance: AAOX3, NAD  Heart: RRR  Lungs: CTAx2  Abdomen:  soft, non tender, non distended  MSK/Extremities:  mobile    MEDICATIONS  (STANDING):  chlorhexidine 2% Cloths 1 Application(s) Topical <User Schedule>  enoxaparin Injectable 50 milliGRAM(s) SubCutaneous every 12 hours  lactated ringers. 1000 milliLiter(s) (75 mL/Hr) IV Continuous <Continuous>  levothyroxine 25 MICROGram(s) Oral daily  pantoprazole  Injectable 40 milliGRAM(s) IV Push daily    MEDICATIONS  (PRN):  HYDROmorphone  Injectable 0.5 milliGRAM(s) IV Push every 6 hours PRN Severe Pain (7 - 10)  ketorolac   Injectable 15 milliGRAM(s) IV Push every 6 hours PRN Moderate Pain (4 - 6)    DVT PROPHYLAXIS: enoxaparin Injectable 50 milliGRAM(s) SubCutaneous every 12 hours    GI PROPHYLAXIS: pantoprazole  Injectable 40 milliGRAM(s) IV Push daily    ANTICOAGULATION:   ANTIBIOTICS:     LAB/STUDIES:  Labs:  CAPILLARY BLOOD GLUCOSE               9.9    6.49  )-----------( 290      ( 10 Jul 2022 21:31 )             30.9      Auto Neutrophil %: 59.7 % (07-10-22 @ 21:31)  Auto Immature Granulocyte %: 0.3 % (07-10-22 @ 21:31)    07-10    140  |  104  |  8<L>  ----------------------------<  80  4.8   |  26  |  0.9    Calcium, Total Serum: 7.8 mg/dL (07-10-22 @ 21:31)    LFTs:     Lactate, Blood: 1.6 mmol/L (07-08-22 @ 13:36)
GENERAL SURGERY PROGRESS NOTE    Patient: PARUL SOTO , 67y (57)Female   MRN: [id number]  Location: [address]  Visit: 22 Inpatient  Date: 07-10-22 @ 04:01      Procedure/Dx/Injuries: SBO    Events of past 24 hours: admitted, NGT in place    PAST MEDICAL & SURGICAL HISTORY:  Renal cell carcinoma      H/O left nephrectomy      H/O partial adrenalectomy      History of partial pancreatectomy      S/P splenectomy      H/O lumpectomy          Vitals:   T(F): 97.8 (07-10-22 @ 00:00), Max: 98.5 (22 @ 15:26)  HR: 78 (07-10-22 @ 02:12)  BP: 114/60 (07-10-22 @ 02:12)  RR: 18 (07-10-22 @ 00:00)  SpO2: 98% (07-10-22 @ 00:00)      Diet, Clear Liquid      Fluids:     I & O's:    22 @ 07:01  -  22 @ 07:00  --------------------------------------------------------  IN:  Total IN: 0 mL    OUT:    Nasogastric/Oral tube (mL): 600 mL  Total OUT: 600 mL    Total NET: -600 mL          PHYSICAL EXAM:  General Appearance: AAOX3, NAD  Heart: RRR  Lungs: CTAx2  Abdomen:  soft, non tender, non distended  MSK/Extremities:  mobile      MEDICATIONS  (STANDING):  chlorhexidine 2% Cloths 1 Application(s) Topical <User Schedule>  enoxaparin Injectable 50 milliGRAM(s) SubCutaneous every 12 hours  lactated ringers. 1000 milliLiter(s) (75 mL/Hr) IV Continuous <Continuous>  levothyroxine Injectable 12.5 MICROGram(s) IV Push at bedtime  pantoprazole  Injectable 40 milliGRAM(s) IV Push daily    MEDICATIONS  (PRN):  HYDROmorphone  Injectable 0.5 milliGRAM(s) IV Push every 6 hours PRN Severe Pain (7 - 10)  ketorolac   Injectable 15 milliGRAM(s) IV Push every 6 hours PRN Moderate Pain (4 - 6)      DVT PROPHYLAXIS: enoxaparin Injectable 50 milliGRAM(s) SubCutaneous every 12 hours    GI PROPHYLAXIS: pantoprazole  Injectable 40 milliGRAM(s) IV Push daily    ANTICOAGULATION:   ANTIBIOTICS:            LAB/STUDIES:  Labs:  CAPILLARY BLOOD GLUCOSE                              10.3   7.30  )-----------( 292      ( 2022 22:27 )             31.0       Auto Neutrophil %: 66.2 % (22 @ 22:27)  Auto Immature Granulocyte %: 0.3 % (22 @ 22:27)  Auto Neutrophil %: 67.5 % (22 @ 05:25)  Auto Immature Granulocyte %: 0.1 % (22 @ 05:25)        135  |  99  |  10  ----------------------------<  85  4.2   |  25  |  1.0      Calcium, Total Serum: 8.0 mg/dL (22 @ 22:27)      LFTs:             6.6  | 0.4  | 30       ------------------[85      ( 2022 13:36 )  3.9  | <0.2 | 26          Lipase:11     Amylase:x         Lactate, Blood: 1.6 mmol/L (22 @ 13:36)      Coags:            Urinalysis Basic - ( 2022 18:41 )    Color: Light Yellow / Appearance: Clear / S.033 / pH: x  Gluc: x / Ketone: Negative  / Bili: Negative / Urobili: <2 mg/dL   Blood: x / Protein: Trace / Nitrite: Negative   Leuk Esterase: Small / RBC: 2 /HPF / WBC 4 /HPF   Sq Epi: x / Non Sq Epi: 5 /HPF / Bacteria: Negative                IMAGING:      ACCESS/ DEVICES:  [x ] Peripheral IV  [ ] Central Venous Line	[ ] R	[ ] L	[ ] IJ	[ ] Fem	[ ] SC	Placed:   [ ] Arterial Line		[ ] R	[ ] L	[ ] Fem	[ ] Rad	[ ] Ax	Placed:   [ ] PICC:					[ ] Mediport  [ ] Urinary Catheter,  Date Placed:   [ ] Chest tube: [ ] Right, [ ] Left  [ ] BABS/Anand Drains

## 2022-07-13 ENCOUNTER — TRANSCRIPTION ENCOUNTER (OUTPATIENT)
Age: 67
End: 2022-07-13

## 2022-07-13 VITALS
HEART RATE: 78 BPM | DIASTOLIC BLOOD PRESSURE: 61 MMHG | RESPIRATION RATE: 18 BRPM | SYSTOLIC BLOOD PRESSURE: 131 MMHG | TEMPERATURE: 98 F | OXYGEN SATURATION: 100 %

## 2022-07-13 RX ORDER — LEVOTHYROXINE SODIUM 125 MCG
0 TABLET ORAL
Qty: 0 | Refills: 0 | DISCHARGE

## 2022-07-13 RX ORDER — APIXABAN 2.5 MG/1
0 TABLET, FILM COATED ORAL
Qty: 0 | Refills: 0 | DISCHARGE

## 2022-07-13 RX ADMIN — CHLORHEXIDINE GLUCONATE 1 APPLICATION(S): 213 SOLUTION TOPICAL at 06:04

## 2022-07-13 RX ADMIN — Medication 25 MICROGRAM(S): at 06:04

## 2022-07-13 RX ADMIN — APIXABAN 2.5 MILLIGRAM(S): 2.5 TABLET, FILM COATED ORAL at 06:03

## 2022-07-13 NOTE — DISCHARGE NOTE PROVIDER - NSDCCPCAREPLAN_GEN_ALL_CORE_FT
PRINCIPAL DISCHARGE DIAGNOSIS  Diagnosis: SBO (small bowel obstruction)  Assessment and Plan of Treatment: You came to the hospital with abdominal pain. You were found on CT scan to have a small bowel obstruction.   You may take Tylenol every 4-6 hours as needed for pain.   Continue taking all home medications as previously prescribed.   You may continue regular activity.   If you experience severe pain, abdominal bloating, fever, vomiting- call office or report to [facility] Emergency Department.

## 2022-07-13 NOTE — DISCHARGE NOTE NURSING/CASE MANAGEMENT/SOCIAL WORK - PATIENT PORTAL LINK FT
You can access the FollowMyHealth Patient Portal offered by [facility] by registering at the following website: [email or web address]. By joining [employer]’s FollowMyHealth portal, you will also be able to view your health information using other applications (apps) compatible with our system.

## 2022-07-13 NOTE — DISCHARGE NOTE PROVIDER - HOSPITAL COURSE
66 y/o F with pmhx of RLE DVT, RCC s/p left nephrectomy, splenectomy, partial pancreatectomy and breast lumpectomy presented to the ED with abdominal pain. CT scan showed distended loops of small bowel with transition point in the mid abdomen consistent with SBO. Patient was admitted, made NPO, started on IVF, and NGT was placed with 600cc out on insertion.   Eliquis was changed to therapeutic lovenox while NGT was in place and patient was NPO.   Hospital day 2 patient had obstructive series which showed contrast in colon.   When she began to pass gas, NGT was removed and she was started on clear liquid diet. She then was advanced to regular diet and tolerated well. She was restarted on Eliquis and had a BM.   She will be discharged home in stable condition.

## 2022-07-13 NOTE — DISCHARGE NOTE PROVIDER - NSDCMRMEDTOKEN_GEN_ALL_CORE_FT
CALCIUM 600 MG-VIT D3 5 MCG TB: 1 tab(s) orally once a day  ELIQUIS 2.5 MG TABLET: 1 tab(s) orally 2 times a day  EVEROLIMUS 5 MG TABLET:   LENVIMA 18 MG DAILY DOSE:   SYNTHROID 25 MCG TABLET: 1 tab(s) orally once a day

## 2022-07-13 NOTE — DISCHARGE NOTE PROVIDER - CARE PROVIDER_API CALL
Macho Paredes (MD)  Surgery; Surgical Critical Care  [address] [place] [id number]  Phone: [phone]  Fax: [phone]  Follow Up Time:

## 2022-07-19 DIAGNOSIS — Z88.0 ALLERGY STATUS TO PENICILLIN: ICD-10-CM

## 2022-07-19 DIAGNOSIS — Z86.718 PERSONAL HISTORY OF OTHER VENOUS THROMBOSIS AND EMBOLISM: ICD-10-CM

## 2022-07-19 DIAGNOSIS — K56.609 UNSPECIFIED INTESTINAL OBSTRUCTION, UNSPECIFIED AS TO PARTIAL VERSUS COMPLETE OBSTRUCTION: ICD-10-CM

## 2022-07-19 DIAGNOSIS — E03.9 HYPOTHYROIDISM, UNSPECIFIED: ICD-10-CM

## 2022-07-19 DIAGNOSIS — C64.2 MALIGNANT NEOPLASM OF LEFT KIDNEY, EXCEPT RENAL PELVIS: ICD-10-CM

## 2022-07-19 DIAGNOSIS — Z79.01 LONG TERM (CURRENT) USE OF ANTICOAGULANTS: ICD-10-CM

## 2022-07-19 DIAGNOSIS — K56.51 INTESTINAL ADHESIONS [BANDS], WITH PARTIAL OBSTRUCTION: ICD-10-CM

## 2022-07-19 DIAGNOSIS — Z91.013 ALLERGY TO SEAFOOD: ICD-10-CM

## 2022-12-05 NOTE — ED ADULT TRIAGE NOTE - HAVE YOU HAD A FIRST COVID-19 BOOSTER?
"Jacklyn"Reid Cervantes was seen and treated in our emergency department on 12/5/2022.     COVID-19 is present in our communities across the state. There is limited testing for COVID at this time, so not all patients can be tested. In this situation, your employee meets the following criteria:    Jacklyn Cervantes has met the criteria for COVID-19 testing and has a POSITIVE result. She can return to school once they are asymptomatic for 24 hours without the use of fever reducing medications AND at least five days from the first positive result. A mask is recommended for 5 days post quarantine.     If you have any questions or concerns, or if I can be of further assistance, please do not hesitate to contact me.    Sincerely,             " Yes

## 2023-03-30 ENCOUNTER — TRANSCRIPTION ENCOUNTER (OUTPATIENT)
Age: 68
End: 2023-03-30

## 2023-05-10 ENCOUNTER — NON-APPOINTMENT (OUTPATIENT)
Age: 68
End: 2023-05-10

## 2023-06-26 ENCOUNTER — NON-APPOINTMENT (OUTPATIENT)
Age: 68
End: 2023-06-26

## 2023-07-09 ENCOUNTER — NON-APPOINTMENT (OUTPATIENT)
Age: 68
End: 2023-07-09

## 2024-02-25 ENCOUNTER — INPATIENT (INPATIENT)
Facility: HOSPITAL | Age: 69
LOS: 3 days | Discharge: HOME | DRG: 378 | End: 2024-02-29
Attending: STUDENT IN AN ORGANIZED HEALTH CARE EDUCATION/TRAINING PROGRAM | Admitting: FAMILY MEDICINE
Payer: MEDICARE

## 2024-02-25 VITALS
HEART RATE: 91 BPM | SYSTOLIC BLOOD PRESSURE: 141 MMHG | RESPIRATION RATE: 18 BRPM | TEMPERATURE: 99 F | DIASTOLIC BLOOD PRESSURE: 78 MMHG | OXYGEN SATURATION: 96 %

## 2024-02-25 DIAGNOSIS — Z90.81 ACQUIRED ABSENCE OF SPLEEN: Chronic | ICD-10-CM

## 2024-02-25 DIAGNOSIS — E89.6 POSTPROCEDURAL ADRENOCORTICAL (-MEDULLARY) HYPOFUNCTION: Chronic | ICD-10-CM

## 2024-02-25 DIAGNOSIS — Z98.890 OTHER SPECIFIED POSTPROCEDURAL STATES: Chronic | ICD-10-CM

## 2024-02-25 DIAGNOSIS — K92.2 GASTROINTESTINAL HEMORRHAGE, UNSPECIFIED: ICD-10-CM

## 2024-02-25 DIAGNOSIS — Z90.411 ACQUIRED PARTIAL ABSENCE OF PANCREAS: Chronic | ICD-10-CM

## 2024-02-25 DIAGNOSIS — Z90.5 ACQUIRED ABSENCE OF KIDNEY: Chronic | ICD-10-CM

## 2024-02-25 LAB
ALBUMIN SERPL ELPH-MCNC: 3.3 G/DL — LOW (ref 3.5–5.2)
ALP SERPL-CCNC: 141 U/L — HIGH (ref 30–115)
ALT FLD-CCNC: 19 U/L — SIGNIFICANT CHANGE UP (ref 0–41)
ANION GAP SERPL CALC-SCNC: 13 MMOL/L — SIGNIFICANT CHANGE UP (ref 7–14)
APTT BLD: 39.4 SEC — HIGH (ref 27–39.2)
AST SERPL-CCNC: 41 U/L — SIGNIFICANT CHANGE UP (ref 0–41)
BASOPHILS # BLD AUTO: 0.03 K/UL — SIGNIFICANT CHANGE UP (ref 0–0.2)
BASOPHILS NFR BLD AUTO: 0.5 % — SIGNIFICANT CHANGE UP (ref 0–1)
BILIRUB SERPL-MCNC: 0.2 MG/DL — SIGNIFICANT CHANGE UP (ref 0.2–1.2)
BLD GP AB SCN SERPL QL: SIGNIFICANT CHANGE UP
BUN SERPL-MCNC: 20 MG/DL — SIGNIFICANT CHANGE UP (ref 10–20)
CALCIUM SERPL-MCNC: 9 MG/DL — SIGNIFICANT CHANGE UP (ref 8.4–10.5)
CHLORIDE SERPL-SCNC: 96 MMOL/L — LOW (ref 98–110)
CO2 SERPL-SCNC: 25 MMOL/L — SIGNIFICANT CHANGE UP (ref 17–32)
CREAT SERPL-MCNC: 1.2 MG/DL — SIGNIFICANT CHANGE UP (ref 0.7–1.5)
EGFR: 50 ML/MIN/1.73M2 — LOW
EOSINOPHIL # BLD AUTO: 0.01 K/UL — SIGNIFICANT CHANGE UP (ref 0–0.7)
EOSINOPHIL NFR BLD AUTO: 0.2 % — SIGNIFICANT CHANGE UP (ref 0–8)
GLUCOSE SERPL-MCNC: 94 MG/DL — SIGNIFICANT CHANGE UP (ref 70–99)
HCT VFR BLD CALC: 33.2 % — LOW (ref 37–47)
HGB BLD-MCNC: 11 G/DL — LOW (ref 12–16)
IMM GRANULOCYTES NFR BLD AUTO: 0.3 % — SIGNIFICANT CHANGE UP (ref 0.1–0.3)
INR BLD: 1.5 RATIO — HIGH (ref 0.65–1.3)
LACTATE SERPL-SCNC: 2.4 MMOL/L — HIGH (ref 0.7–2)
LIDOCAIN IGE QN: 15 U/L — SIGNIFICANT CHANGE UP (ref 7–60)
LYMPHOCYTES # BLD AUTO: 1.12 K/UL — LOW (ref 1.2–3.4)
LYMPHOCYTES # BLD AUTO: 17.7 % — LOW (ref 20.5–51.1)
MCHC RBC-ENTMCNC: 32 PG — HIGH (ref 27–31)
MCHC RBC-ENTMCNC: 33.1 G/DL — SIGNIFICANT CHANGE UP (ref 32–37)
MCV RBC AUTO: 96.5 FL — SIGNIFICANT CHANGE UP (ref 81–99)
MONOCYTES # BLD AUTO: 0.93 K/UL — HIGH (ref 0.1–0.6)
MONOCYTES NFR BLD AUTO: 14.7 % — HIGH (ref 1.7–9.3)
NEUTROPHILS # BLD AUTO: 4.21 K/UL — SIGNIFICANT CHANGE UP (ref 1.4–6.5)
NEUTROPHILS NFR BLD AUTO: 66.6 % — SIGNIFICANT CHANGE UP (ref 42.2–75.2)
NRBC # BLD: 0 /100 WBCS — SIGNIFICANT CHANGE UP (ref 0–0)
PLATELET # BLD AUTO: 415 K/UL — HIGH (ref 130–400)
PMV BLD: 10.6 FL — HIGH (ref 7.4–10.4)
POTASSIUM SERPL-MCNC: 4.9 MMOL/L — SIGNIFICANT CHANGE UP (ref 3.5–5)
POTASSIUM SERPL-SCNC: 4.9 MMOL/L — SIGNIFICANT CHANGE UP (ref 3.5–5)
PROT SERPL-MCNC: 7.8 G/DL — SIGNIFICANT CHANGE UP (ref 6–8)
PROTHROM AB SERPL-ACNC: 17.2 SEC — HIGH (ref 9.95–12.87)
RBC # BLD: 3.44 M/UL — LOW (ref 4.2–5.4)
RBC # FLD: 17.3 % — HIGH (ref 11.5–14.5)
SODIUM SERPL-SCNC: 134 MMOL/L — LOW (ref 135–146)
WBC # BLD: 6.32 K/UL — SIGNIFICANT CHANGE UP (ref 4.8–10.8)
WBC # FLD AUTO: 6.32 K/UL — SIGNIFICANT CHANGE UP (ref 4.8–10.8)

## 2024-02-25 PROCEDURE — C9113: CPT

## 2024-02-25 PROCEDURE — 80053 COMPREHEN METABOLIC PANEL: CPT

## 2024-02-25 PROCEDURE — 85730 THROMBOPLASTIN TIME PARTIAL: CPT

## 2024-02-25 PROCEDURE — 93010 ELECTROCARDIOGRAM REPORT: CPT

## 2024-02-25 PROCEDURE — 99285 EMERGENCY DEPT VISIT HI MDM: CPT | Mod: FS

## 2024-02-25 PROCEDURE — 74174 CTA ABD&PLVS W/CONTRAST: CPT | Mod: 26,MC

## 2024-02-25 PROCEDURE — 99223 1ST HOSP IP/OBS HIGH 75: CPT

## 2024-02-25 PROCEDURE — 86850 RBC ANTIBODY SCREEN: CPT

## 2024-02-25 PROCEDURE — 86901 BLOOD TYPING SEROLOGIC RH(D): CPT

## 2024-02-25 PROCEDURE — 36415 COLL VENOUS BLD VENIPUNCTURE: CPT

## 2024-02-25 PROCEDURE — 85610 PROTHROMBIN TIME: CPT

## 2024-02-25 PROCEDURE — 86900 BLOOD TYPING SEROLOGIC ABO: CPT

## 2024-02-25 PROCEDURE — 80048 BASIC METABOLIC PNL TOTAL CA: CPT

## 2024-02-25 PROCEDURE — 85027 COMPLETE CBC AUTOMATED: CPT

## 2024-02-25 RX ORDER — PANTOPRAZOLE SODIUM 20 MG/1
40 TABLET, DELAYED RELEASE ORAL DAILY
Refills: 0 | Status: DISCONTINUED | OUTPATIENT
Start: 2024-02-25 | End: 2024-02-28

## 2024-02-25 RX ORDER — AMLODIPINE BESYLATE 2.5 MG/1
5 TABLET ORAL DAILY
Refills: 0 | Status: DISCONTINUED | OUTPATIENT
Start: 2024-02-25 | End: 2024-02-25

## 2024-02-25 RX ORDER — ESCITALOPRAM OXALATE 10 MG/1
5 TABLET, FILM COATED ORAL DAILY
Refills: 0 | Status: DISCONTINUED | OUTPATIENT
Start: 2024-02-25 | End: 2024-02-25

## 2024-02-25 RX ORDER — ACETAMINOPHEN 500 MG
650 TABLET ORAL EVERY 6 HOURS
Refills: 0 | Status: DISCONTINUED | OUTPATIENT
Start: 2024-02-25 | End: 2024-02-28

## 2024-02-25 RX ORDER — POLYETHYLENE GLYCOL 3350 17 G/17G
17 POWDER, FOR SOLUTION ORAL
Refills: 0 | Status: DISCONTINUED | OUTPATIENT
Start: 2024-02-25 | End: 2024-02-28

## 2024-02-25 RX ORDER — SENNA PLUS 8.6 MG/1
2 TABLET ORAL AT BEDTIME
Refills: 0 | Status: DISCONTINUED | OUTPATIENT
Start: 2024-02-25 | End: 2024-02-28

## 2024-02-25 RX ORDER — SODIUM CHLORIDE 9 MG/ML
1000 INJECTION INTRAMUSCULAR; INTRAVENOUS; SUBCUTANEOUS
Refills: 0 | Status: DISCONTINUED | OUTPATIENT
Start: 2024-02-25 | End: 2024-02-28

## 2024-02-25 RX ORDER — SODIUM CHLORIDE 9 MG/ML
1000 INJECTION INTRAMUSCULAR; INTRAVENOUS; SUBCUTANEOUS ONCE
Refills: 0 | Status: COMPLETED | OUTPATIENT
Start: 2024-02-25 | End: 2024-02-25

## 2024-02-25 RX ORDER — ONDANSETRON 8 MG/1
4 TABLET, FILM COATED ORAL EVERY 6 HOURS
Refills: 0 | Status: DISCONTINUED | OUTPATIENT
Start: 2024-02-25 | End: 2024-02-28

## 2024-02-25 RX ORDER — LEVOTHYROXINE SODIUM 125 MCG
25 TABLET ORAL DAILY
Refills: 0 | Status: DISCONTINUED | OUTPATIENT
Start: 2024-02-25 | End: 2024-02-28

## 2024-02-25 RX ADMIN — SODIUM CHLORIDE 2000 MILLILITER(S): 9 INJECTION INTRAMUSCULAR; INTRAVENOUS; SUBCUTANEOUS at 16:24

## 2024-02-25 NOTE — ED ADULT NURSE NOTE - OBJECTIVE STATEMENT
pt in er for complaints of bloody stools and abdominal discomfort since yesterday     pt has rash to bilateral legs since monday, pt denies any new medications, detergents or fragrances   pts rash is red, dry and scaly

## 2024-02-25 NOTE — H&P ADULT - NSHPPHYSICALEXAM_GEN_ALL_CORE
ICU Vital Signs Last 24 Hrs  T(C): 35.9 (25 Feb 2024 20:30), Max: 37.1 (25 Feb 2024 15:32)  T(F): 96.6 (25 Feb 2024 20:30), Max: 98.8 (25 Feb 2024 15:32)  HR: 72 (25 Feb 2024 22:49) (69 - 91)  BP: 129/88 (25 Feb 2024 20:30) (129/88 - 141/78)  BP(mean): --  ABP: --  ABP(mean): --  RR: 20 (25 Feb 2024 20:30) (18 - 20)  SpO2: 97% (25 Feb 2024 20:30) (96% - 97%)    O2 Parameters below as of 25 Feb 2024 20:30  Patient On (Oxygen Delivery Method): room air        Constitutional: NAD, well-nourished, non toxic appearing   HEENT: Airway patent, moist MM, no erythema/swelling/deformity of oral structures. EOMI, PERRLA.  CV: regular rate, regular rhythm, well-perfused extremities, 2+ b/l DP and radial pulses equal.  Lungs: BCTA, no increased WOB.  ABD: NTND, no guarding or rebound, no pulsatile mass, no hernias.   MSK: Neck supple, nontender, nl ROM, no stepoff. Chest nontender. Back nontender in TLS spine or to b/l bony structures or flanks. Ext nontender, nl rom, no deformity.   INTEG: Skin warm, dry, no rash.  NEURO: A&Ox3, normal strength, nl sensation throughout, normal speech.   PSYCH: Denies SI/HI/hallucinations T(C): 35.9 (25 Feb 2024 20:30), Max: 37.1 (25 Feb 2024 15:32)  T(F): 96.6 (25 Feb 2024 20:30), Max: 98.8 (25 Feb 2024 15:32)  HR: 72 (25 Feb 2024 22:49) (69 - 91)  BP: 129/88 (25 Feb 2024 20:30) (129/88 - 141/78)  BP(mean): --  ABP: --  ABP(mean): --  RR: 20 (25 Feb 2024 20:30) (18 - 20)  SpO2: 97% (25 Feb 2024 20:30) (96% - 97%)    O2 Parameters below as of 25 Feb 2024 20:30  Patient On (Oxygen Delivery Method): room air        Constitutional: NAD, well-nourished, non toxic appearing   HEENT: Airway patent, moist MM, no erythema/swelling/deformity of oral structures. EOMI, PERRLA.  CV: regular rate, regular rhythm, well-perfused extremities, 2+ b/l DP and radial pulses equal.  Lungs: BCTA, no increased WOB.  ABD: NTND, no guarding or rebound, no pulsatile mass, no hernias.   MSK: Neck supple, nontender, nl ROM, no stepoff. Chest nontender. Back nontender in TLS spine or to b/l bony structures or flanks. Ext nontender, nl rom, no deformity.   INTEG: Skin warm, dry, no rash.  NEURO: A&Ox3, normal strength, nl sensation throughout, normal speech.   PSYCH: Denies SI/HI/hallucinations

## 2024-02-25 NOTE — CONSULT NOTE ADULT - SUBJECTIVE AND OBJECTIVE BOX
Patient is a 68y old  Female who presents with a chief complaint of rectal bleeding    HPI:      PAST MEDICAL & SURGICAL HISTORY:  Renal cell carcinoma      Hypertension      Breast cancer      H/O left nephrectomy      H/O partial adrenalectomy      History of partial pancreatectomy      S/P splenectomy      H/O lumpectomy        Allergies    penicillins (Angioedema)  fish (Unknown)    Intolerances      FAMILY HISTORY:    Home Medications:  CALCIUM 600 MG-VIT D3 5 MCG TB: 1 tab(s) orally once a day (13 Jul 2022 07:40)  ELIQUIS 2.5 MG TABLET: 1 tab(s) orally 2 times a day (13 Jul 2022 07:40)  EVEROLIMUS 5 MG TABLET:  (09 Jul 2022 01:11)  LENVIMA 18 MG DAILY DOSE:  (09 Jul 2022 01:11)  SYNTHROID 25 MCG TABLET: 1 tab(s) orally once a day (13 Jul 2022 07:40)    Occupation:  Alochol: Denied  Smoking: Denied  Drug Use: Denied  Marital Status:         ROS: as in HPI; All other systems reviewed are negative    ICU Vital Signs Last 24 Hrs  T(C): 35.9 (25 Feb 2024 20:30), Max: 37.1 (25 Feb 2024 15:32)  T(F): 96.6 (25 Feb 2024 20:30), Max: 98.8 (25 Feb 2024 15:32)  HR: 69 (25 Feb 2024 20:30) (69 - 91)  BP: 129/88 (25 Feb 2024 20:30) (129/88 - 141/78)  BP(mean): --  ABP: --  ABP(mean): --  RR: 20 (25 Feb 2024 20:30) (18 - 20)  SpO2: 97% (25 Feb 2024 20:30) (96% - 97%)    O2 Parameters below as of 25 Feb 2024 20:30  Patient On (Oxygen Delivery Method): room air              Physical Examination:    General: No acute distress.  Alert, oriented, interactive, nonfocal    HEENT: Pupils equal, reactive to light.  Symmetric.    PULM: Clear to auscultation bilaterally, no significant sputum production    CVS: Regular rate and rhythm, no murmurs, rubs, or gallops    ABD: Soft, nondistended, nontender, normoactive bowel sounds, no masses    EXT: No edema, nontender    SKIN: Warm and well perfused, no rashes noted.              I&O's Detail        LABS:                        11.0   6.32  )-----------( 415      ( 25 Feb 2024 16:30 )             33.2     25 Feb 2024 16:30    134    |  96     |  20     ----------------------------<  94     4.9     |  25     |  1.2      Ca    9.0        25 Feb 2024 16:30    TPro  7.8    /  Alb  3.3    /  TBili  0.2    /  DBili  x      /  AST  41     /  ALT  19     /  AlkPhos  141    25 Feb 2024 16:30  Amylase x     lipase 15             CAPILLARY BLOOD GLUCOSE        PT/INR - ( 25 Feb 2024 16:30 )   PT: 17.20 sec;   INR: 1.50 ratio         PTT - ( 25 Feb 2024 16:30 )  PTT:39.4 sec  Urinalysis Basic - ( 25 Feb 2024 16:30 )    Color: x / Appearance: x / SG: x / pH: x  Gluc: 94 mg/dL / Ketone: x  / Bili: x / Urobili: x   Blood: x / Protein: x / Nitrite: x   Leuk Esterase: x / RBC: x / WBC x   Sq Epi: x / Non Sq Epi: x / Bacteria: x      Culture    Lactate, Blood: 2.4 mmol/L (02-25-24 @ 16:30)      MEDICATIONS  (STANDING):    MEDICATIONS  (PRN):        RADIOLOGY: ***     CXR:  TLC:  OG:  ET tube:          ECHO:      IMPRESSION:        PLAN:    CNS:    HEENT:    PULMONARY:    CARDIOVASCULAR:    GI: GI prophylaxis.  Feeding     RENAL:    INFECTIOUS DISEASE:    HEMATOLOGICAL:  DVT prophylaxis.    ENDOCRINE:  Follow up FS.  Insulin protocol if needed.    MUSCULOSKELETAL:        CRITICAL CARE TIME SPENT: ***     Patient is a 68y old  Female, w/PMH as noted below,  who presents with a chief complaint of rectal bleeding x 24 hrs.  Pt is on eliquis for recurring DVT, despite IVC filter placement . Pt states she had 2 episodes of BRBPR today. Presently in ER pts Hgb is 11, Vital sign stable.      PAST MEDICAL & SURGICAL HISTORY:    Renal cell carcinoma      Hypertension      Breast cancer      H/O left nephrectomy      H/O partial adrenalectomy      History of partial pancreatectomy      S/P splenectomy      H/O lumpectomy        Allergies    penicillins (Angioedema)  fish (Unknown)    Intolerances      FAMILY HISTORY:    Home Medications:  CALCIUM 600 MG-VIT D3 5 MCG TB: 1 tab(s) orally once a day (13 Jul 2022 07:40)  ELIQUIS 2.5 MG TABLET: 1 tab(s) orally 2 times a day (13 Jul 2022 07:40)  EVEROLIMUS 5 MG TABLET:  (09 Jul 2022 01:11)  LENVIMA 18 MG DAILY DOSE:  (09 Jul 2022 01:11)  SYNTHROID 25 MCG TABLET: 1 tab(s) orally once a day (13 Jul 2022 07:40)    Occupation:  Alochol: Denied  Smoking: Denied  Drug Use: Denied  Marital Status:         ROS: as in HPI; All other systems reviewed are negative    ICU Vital Signs Last 24 Hrs  T(C): 35.9 (25 Feb 2024 20:30), Max: 37.1 (25 Feb 2024 15:32)  T(F): 96.6 (25 Feb 2024 20:30), Max: 98.8 (25 Feb 2024 15:32)  HR: 69 (25 Feb 2024 20:30) (69 - 91)  BP: 129/88 (25 Feb 2024 20:30) (129/88 - 141/78)  BP(mean): --  ABP: --  ABP(mean): --  RR: 20 (25 Feb 2024 20:30) (18 - 20)  SpO2: 97% (25 Feb 2024 20:30) (96% - 97%)    O2 Parameters below as of 25 Feb 2024 20:30  Patient On (Oxygen Delivery Method): room air              Physical Examination:    General: No acute distress.  Alert, oriented, interactive, nonfocal    HEENT: Pupils equal, reactive to light.  Symmetric.    PULM: Clear to auscultation bilaterally, no significant sputum production    CVS: Regular rate and rhythm, no murmurs, rubs, or gallops    ABD: Soft, nondistended, nontender, normoactive bowel sounds, no masses    EXT: No edema, nontender    SKIN: Warm and well perfused, no rashes noted.              I&O's Detail        LABS:                        11.0   6.32  )-----------( 415      ( 25 Feb 2024 16:30 )             33.2     25 Feb 2024 16:30    134    |  96     |  20     ----------------------------<  94     4.9     |  25     |  1.2      Ca    9.0        25 Feb 2024 16:30    TPro  7.8    /  Alb  3.3    /  TBili  0.2    /  DBili  x      /  AST  41     /  ALT  19     /  AlkPhos  141    25 Feb 2024 16:30  Amylase x     lipase 15             CAPILLARY BLOOD GLUCOSE        PT/INR - ( 25 Feb 2024 16:30 )   PT: 17.20 sec;   INR: 1.50 ratio         PTT - ( 25 Feb 2024 16:30 )  PTT:39.4 sec  Urinalysis Basic - ( 25 Feb 2024 16:30 )    Color: x / Appearance: x / SG: x / pH: x  Gluc: 94 mg/dL / Ketone: x  / Bili: x / Urobili: x   Blood: x / Protein: x / Nitrite: x   Leuk Esterase: x / RBC: x / WBC x   Sq Epi: x / Non Sq Epi: x / Bacteria: x      Culture    Lactate, Blood: 2.4 mmol/L (02-25-24 @ 16:30)      MEDICATIONS  (STANDING):    MEDICATIONS  (PRN):        RADIOLOGY: ***     CXR:  TLC:  OG:  ET tube:          ECHO:      IMPRESSION:  elderly W female w/ acute rectal bleeding. Pt's on AC for DVT        PLAN:    As discussed w/ ICU MD Gruber  Pt currently stable does not need ICU adm  f/u subsequent CBC q 6 h  transfuse to keep Hgb > 8  hold AC   IV protonix  GI consult for colonoscopy & /or egd  npo except for meds for possible GI intervention        CRITICAL CARE TIME SPENT: ***     Patient is a 68y old  Female, w/PMH as noted below,  who presents with a chief complaint of rectal bleeding x 24 hrs.  Pt is on eliquis for recurring DVT, despite IVC filter placement . Pt states she had 2 episodes of BRBPR today. Presently in ER pts Hgb is 11, Vital sign stable.      PAST MEDICAL & SURGICAL HISTORY:    Renal cell carcinoma      Hypertension      Breast cancer      H/O left nephrectomy      H/O partial adrenalectomy      History of partial pancreatectomy      S/P splenectomy      H/O lumpectomy        Allergies    penicillins (Angioedema)  fish (Unknown)    Intolerances      FAMILY HISTORY:    Home Medications:  CALCIUM 600 MG-VIT D3 5 MCG TB: 1 tab(s) orally once a day (13 Jul 2022 07:40)  ELIQUIS 2.5 MG TABLET: 1 tab(s) orally 2 times a day (13 Jul 2022 07:40)  EVEROLIMUS 5 MG TABLET:  (09 Jul 2022 01:11)  LENVIMA 18 MG DAILY DOSE:  (09 Jul 2022 01:11)  SYNTHROID 25 MCG TABLET: 1 tab(s) orally once a day (13 Jul 2022 07:40)    Occupation:  Alochol: Denied  Smoking: Denied  Drug Use: Denied  Marital Status:         ROS: as in HPI; All other systems reviewed are negative    ICU Vital Signs Last 24 Hrs  T(C): 35.9 (25 Feb 2024 20:30), Max: 37.1 (25 Feb 2024 15:32)  T(F): 96.6 (25 Feb 2024 20:30), Max: 98.8 (25 Feb 2024 15:32)  HR: 69 (25 Feb 2024 20:30) (69 - 91)  BP: 129/88 (25 Feb 2024 20:30) (129/88 - 141/78)  BP(mean): --  ABP: --  ABP(mean): --  RR: 20 (25 Feb 2024 20:30) (18 - 20)  SpO2: 97% (25 Feb 2024 20:30) (96% - 97%)    O2 Parameters below as of 25 Feb 2024 20:30  Patient On (Oxygen Delivery Method): room air              Physical Examination:    General: No acute distress.  Alert, oriented, interactive, nonfocal    HEENT: Pupils equal, reactive to light.  Symmetric.    PULM: Clear to auscultation bilaterally, no significant sputum production    CVS: Regular rate and rhythm, no murmurs, rubs, or gallops    ABD: Soft, nondistended, nontender, normoactive bowel sounds, no masses    EXT: No edema, nontender    SKIN: Warm and well perfused, no rashes noted.              I&O's Detail        LABS:                        11.0   6.32  )-----------( 415      ( 25 Feb 2024 16:30 )             33.2     25 Feb 2024 16:30    134    |  96     |  20     ----------------------------<  94     4.9     |  25     |  1.2      Ca    9.0        25 Feb 2024 16:30    TPro  7.8    /  Alb  3.3    /  TBili  0.2    /  DBili  x      /  AST  41     /  ALT  19     /  AlkPhos  141    25 Feb 2024 16:30  Amylase x     lipase 15             CAPILLARY BLOOD GLUCOSE        PT/INR - ( 25 Feb 2024 16:30 )   PT: 17.20 sec;   INR: 1.50 ratio         PTT - ( 25 Feb 2024 16:30 )  PTT:39.4 sec  Urinalysis Basic - ( 25 Feb 2024 16:30 )    Color: x / Appearance: x / SG: x / pH: x  Gluc: 94 mg/dL / Ketone: x  / Bili: x / Urobili: x   Blood: x / Protein: x / Nitrite: x   Leuk Esterase: x / RBC: x / WBC x   Sq Epi: x / Non Sq Epi: x / Bacteria: x      Culture    Lactate, Blood: 2.4 mmol/L (02-25-24 @ 16:30)      MEDICATIONS  (STANDING):    MEDICATIONS  (PRN):        RADIOLOGY: ***     CXR:  TLC:  OG:  ET tube:          ECHO:

## 2024-02-25 NOTE — H&P ADULT - ASSESSMENT
69 yo F PMH of hypothyroidism, DVT (on eliquis), metastatic renal cell carcinoma s/p L nephrectomy, distal pancreatectomy and splenectomy on chemotherapy presents to ER with BRBPR.     #rectal bleeding   - CT: active bleeding site likely within proximal sigmoid colon  - admit to medicine, monitor on tele   - IR consulted - No intervention at current time   - GI consult   - NPO   - IVF   - trend h/h, t&s, transfuse prn     # H/o renal cell CA   - s/p  L nephrectomy  - on chemo (non formulary - instructed to bring from home)     # H/O DVT  - hold Eliquis     # H/O Hypothyroidism   - c/w synthroid     #dvt ppx - scds   # gi ppx   #npo   #full code

## 2024-02-25 NOTE — ED PROVIDER NOTE - ATTENDING APP SHARED VISIT CONTRIBUTION OF CARE
68-year-old female past medical history of renal cell carcinoma with left-sided nephrectomy, history of DVT on Eliquis presents for evaluation of bright red blood per rectum since last night.  Patient states she has had 2-3 episodes of blood with stool.  No pain, shortness of breath, on exam patient in NAD, AAOx3, appears pale however conjunctivae are pink, abdomen is soft, nontender tender nondistended, positive bright red blood

## 2024-02-25 NOTE — ED ADULT NURSE NOTE - PRO INTERPRETER NEED 2
Patient presents ambulatory to the ED from home with mother c/o asthma exacerbation, shortness of breath, cough, and generalized headache. Per mother, pt lost her rescue inhaler so none taken PTA. No respiratory distress noted, pt speaking in full sentences at this time.   
English

## 2024-02-25 NOTE — ED PROVIDER NOTE - CLINICAL SUMMARY MEDICAL DECISION MAKING FREE TEXT BOX
Labs were obtained.  Hemoglobin is 11 and is stable for patient.  We obtained CT which reveals area concerning for active bleed.  I consulted with IR who states that there is no acute intervention at this time.  Recommend consulting with GI.  We did speak with GI who recommends starting prep for colonoscopy for possible procedure tomorrow.  Patient remained stable at this time and was updated of results and plan.

## 2024-02-25 NOTE — H&P ADULT - NSHPLABSRESULTS_GEN_ALL_CORE
11.0   6.32  )-----------( 415      ( 25 Feb 2024 16:30 )             33.2   Urinalysis Basic - ( 25 Feb 2024 16:30 )    Color: x / Appearance: x / SG: x / pH: x  Gluc: 94 mg/dL / Ketone: x  / Bili: x / Urobili: x   Blood: x / Protein: x / Nitrite: x   Leuk Esterase: x / RBC: x / WBC x   Sq Epi: x / Non Sq Epi: x / Bacteria: x    02-25    134<L>  |  96<L>  |  20  ----------------------------<  94  4.9   |  25  |  1.2    Ca    9.0      25 Feb 2024 16:30    TPro  7.8  /  Alb  3.3<L>  /  TBili  0.2  /  DBili  x   /  AST  41  /  ALT  19  /  AlkPhos  141<H>  02-25      CT:    IMPRESSION:    Within the left lower quadrant, , a blush of contrast seen on arterial   phase imaging which pools further on delayed imaging (304/219, 309/77)   compatible with active bleeding site likely within proximal sigmoid colon.    more lytic destructive lesion of the left sacrum, consistent with   metastatic disease (301/214)..    Interval development of multiple bilobar vascular metastases

## 2024-02-25 NOTE — ED ADULT NURSE NOTE - SUICIDE SCREENING DEPRESSION
[FreeTextEntry1] : Large amount cerumen cleared each ear canal.\par Ear canals and tympanic membranes  unremarkable.\par symptoms relieved. F/u    .\par \par audio and eval for hearing aid Negative

## 2024-02-25 NOTE — ED PROVIDER NOTE - OBJECTIVE STATEMENT
patient with H/o stage 4 renal CA, left nephrectomy c/o 2 episodes of bright red blood with BMs last night and today, Patient on eliquis for DVTs in the past    No N/V. no ABD pain , no chest pain, no SOB

## 2024-02-25 NOTE — H&P ADULT - HISTORY OF PRESENT ILLNESS
pt is a 67 yo F PMH of hypothyroidism, DVT on eliquis, metastatic renal cell carcinoma s/p L nephrectomy, distal pancreatectomy and splenectomy on chemotherapy presents to ER with BRBPR. Pt states she had 2 episodes of BRBPR while passing stools. pt on Eliquis. Denies LOC, syncope, lightheadedness. Denies fever, chills, cp, sob, n/v, abdominal pain.     CTA with active bleeding site likely within proximal sigmoid colon. IR consulted in ER       pt is a 69 yo F PMH of hypothyroidism, DVT on eliquis, metastatic renal cell carcinoma stage 4 s/p L nephrectomy, distal pancreatectomy and splenectomy on chemotherapy presents to ER with BRBPR. Pt states she had 2 episodes of BRBPR while passing stools that started this am. pt on Eliquis. Denies LOC, syncope, lightheadedness. Denies fever, chills, cp, sob, n/v, abdominal pain.     CTA with active bleeding site likely within proximal sigmoid colon. IR consulted in ER who recommended GI consult, ER discussed case with GI who will evaluate the patient in the am

## 2024-02-26 PROBLEM — C64.9 MALIGNANT NEOPLASM OF UNSPECIFIED KIDNEY, EXCEPT RENAL PELVIS: Chronic | Status: ACTIVE | Noted: 2022-07-09

## 2024-02-26 LAB
ALBUMIN SERPL ELPH-MCNC: 3 G/DL — LOW (ref 3.5–5.2)
ALP SERPL-CCNC: 126 U/L — HIGH (ref 30–115)
ALT FLD-CCNC: 15 U/L — SIGNIFICANT CHANGE UP (ref 0–41)
ANION GAP SERPL CALC-SCNC: 14 MMOL/L — SIGNIFICANT CHANGE UP (ref 7–14)
AST SERPL-CCNC: 27 U/L — SIGNIFICANT CHANGE UP (ref 0–41)
BILIRUB SERPL-MCNC: 0.3 MG/DL — SIGNIFICANT CHANGE UP (ref 0.2–1.2)
BUN SERPL-MCNC: 18 MG/DL — SIGNIFICANT CHANGE UP (ref 10–20)
CALCIUM SERPL-MCNC: 8.3 MG/DL — LOW (ref 8.4–10.5)
CHLORIDE SERPL-SCNC: 101 MMOL/L — SIGNIFICANT CHANGE UP (ref 98–110)
CO2 SERPL-SCNC: 22 MMOL/L — SIGNIFICANT CHANGE UP (ref 17–32)
CREAT SERPL-MCNC: 0.9 MG/DL — SIGNIFICANT CHANGE UP (ref 0.7–1.5)
EGFR: 70 ML/MIN/1.73M2 — SIGNIFICANT CHANGE UP
GLUCOSE SERPL-MCNC: 70 MG/DL — SIGNIFICANT CHANGE UP (ref 70–99)
HCT VFR BLD CALC: 31.5 % — LOW (ref 37–47)
HCT VFR BLD CALC: 33.1 % — LOW (ref 37–47)
HGB BLD-MCNC: 10.7 G/DL — LOW (ref 12–16)
HGB BLD-MCNC: 11 G/DL — LOW (ref 12–16)
MCHC RBC-ENTMCNC: 32.1 PG — HIGH (ref 27–31)
MCHC RBC-ENTMCNC: 32.4 PG — HIGH (ref 27–31)
MCHC RBC-ENTMCNC: 33.2 G/DL — SIGNIFICANT CHANGE UP (ref 32–37)
MCHC RBC-ENTMCNC: 34 G/DL — SIGNIFICANT CHANGE UP (ref 32–37)
MCV RBC AUTO: 95.5 FL — SIGNIFICANT CHANGE UP (ref 81–99)
MCV RBC AUTO: 96.5 FL — SIGNIFICANT CHANGE UP (ref 81–99)
NRBC # BLD: 0 /100 WBCS — SIGNIFICANT CHANGE UP (ref 0–0)
NRBC # BLD: 0 /100 WBCS — SIGNIFICANT CHANGE UP (ref 0–0)
PLATELET # BLD AUTO: 413 K/UL — HIGH (ref 130–400)
PLATELET # BLD AUTO: 434 K/UL — HIGH (ref 130–400)
PMV BLD: 10 FL — SIGNIFICANT CHANGE UP (ref 7.4–10.4)
PMV BLD: 9.7 FL — SIGNIFICANT CHANGE UP (ref 7.4–10.4)
POTASSIUM SERPL-MCNC: 4.2 MMOL/L — SIGNIFICANT CHANGE UP (ref 3.5–5)
POTASSIUM SERPL-SCNC: 4.2 MMOL/L — SIGNIFICANT CHANGE UP (ref 3.5–5)
PROT SERPL-MCNC: 6.5 G/DL — SIGNIFICANT CHANGE UP (ref 6–8)
RBC # BLD: 3.3 M/UL — LOW (ref 4.2–5.4)
RBC # BLD: 3.43 M/UL — LOW (ref 4.2–5.4)
RBC # FLD: 17.3 % — HIGH (ref 11.5–14.5)
RBC # FLD: 17.4 % — HIGH (ref 11.5–14.5)
SODIUM SERPL-SCNC: 137 MMOL/L — SIGNIFICANT CHANGE UP (ref 135–146)
WBC # BLD: 5.39 K/UL — SIGNIFICANT CHANGE UP (ref 4.8–10.8)
WBC # BLD: 5.91 K/UL — SIGNIFICANT CHANGE UP (ref 4.8–10.8)
WBC # FLD AUTO: 5.39 K/UL — SIGNIFICANT CHANGE UP (ref 4.8–10.8)
WBC # FLD AUTO: 5.91 K/UL — SIGNIFICANT CHANGE UP (ref 4.8–10.8)

## 2024-02-26 PROCEDURE — 99222 1ST HOSP IP/OBS MODERATE 55: CPT

## 2024-02-26 PROCEDURE — 99223 1ST HOSP IP/OBS HIGH 75: CPT

## 2024-02-26 PROCEDURE — 99232 SBSQ HOSP IP/OBS MODERATE 35: CPT

## 2024-02-26 RX ORDER — SOD SULF/SODIUM/NAHCO3/KCL/PEG
4000 SOLUTION, RECONSTITUTED, ORAL ORAL ONCE
Refills: 0 | Status: DISCONTINUED | OUTPATIENT
Start: 2024-02-27 | End: 2024-02-28

## 2024-02-26 RX ORDER — AXITINIB 1 MG/1
5 TABLET, FILM COATED ORAL EVERY 12 HOURS
Refills: 0 | Status: DISCONTINUED | OUTPATIENT
Start: 2024-02-26 | End: 2024-02-27

## 2024-02-26 RX ADMIN — PANTOPRAZOLE SODIUM 40 MILLIGRAM(S): 20 TABLET, DELAYED RELEASE ORAL at 12:21

## 2024-02-26 RX ADMIN — POLYETHYLENE GLYCOL 3350 17 GRAM(S): 17 POWDER, FOR SOLUTION ORAL at 18:35

## 2024-02-26 RX ADMIN — POLYETHYLENE GLYCOL 3350 17 GRAM(S): 17 POWDER, FOR SOLUTION ORAL at 06:11

## 2024-02-26 RX ADMIN — Medication 25 MICROGRAM(S): at 06:11

## 2024-02-26 RX ADMIN — AXITINIB 5 MILLIGRAM(S): 1 TABLET, FILM COATED ORAL at 21:01

## 2024-02-26 NOTE — CONSULT NOTE ADULT - SUBJECTIVE AND OBJECTIVE BOX
Chief complaint/Reason for consult: rectal bleeding +CTA    HPI:  pt is a 67 yo F PMH of hypothyroidism, DVT on eliquis, metastatic renal cell carcinoma stage 4 s/p L nephrectomy, distal pancreatectomy and splenectomy on chemotherapy presents to ER with BRBPR. Pt states she had 2 episodes of BRBPR while passing stools that started this am. pt on Eliquis. Denies LOC, syncope, lightheadedness. Denies fever, chills, cp, sob, n/v, abdominal pain.     CTA with active bleeding site likely within proximal sigmoid colon. IR consulted in ER who recommended GI consult, ER discussed case with GI who will evaluate the patient in the am   (25 Feb 2024 22:56)    GI updates: 68yFemale pmh hypothyroidism DVT 2019 on eliquis, metastatic renal cell carcinoma stage 4 s/p L nephrectomy, distal pancreatectomy and splenectomy on chemotherapy presents to ER with BRBPR Saturday night and Sunday. GI consulted for rectal bleeding and CTA positive. Currently Patient denies nausea, vomiting, hematemesis, melena, blood in stool, diarrhea, constipation, abdominal pain.      PAST MEDICAL & SURGICAL HISTORY:   Renal cell carcinoma      Hypertension      Breast cancer      H/O left nephrectomy      H/O partial adrenalectomy      History of partial pancreatectomy      S/P splenectomy      H/O lumpectomy            Family history:  FAMILY HISTORY:    No GI cancers in first or second degree relatives    Social History: No smoking. No alcohol. No illegal drug use.    Allergies:   penicillins (Angioedema)  fish (Unknown)  Intolerances        MEDICATIONS: Home Medications:  AMLODIPINE BESYLATE 5 MG TAB:  (25 Feb 2024 22:55)  CALCIUM 600 MG-VIT D3 5 MCG TB: 1 tab(s) orally once a day (13 Jul 2022 07:40)  ELIQUIS 2.5 MG TABLET: 1 tab(s) orally 2 times a day (13 Jul 2022 07:40)  ESCITALOPRAM 5 MG TABLET:  (25 Feb 2024 22:55)  EVEROLIMUS 5 MG TABLET:  (09 Jul 2022 01:11)  INLYTA 5 MG TABLET:  (25 Feb 2024 22:55)  LENVIMA 18 MG DAILY DOSE:  (09 Jul 2022 01:11)  SYNTHROID 25 MCG TABLET: 1 tab(s) orally once a day (13 Jul 2022 07:40)    MEDICATIONS  (STANDING):  levothyroxine 25 MICROGram(s) Oral daily  pantoprazole  Injectable 40 milliGRAM(s) IV Push daily  polyethylene glycol 3350 17 Gram(s) Oral two times a day  sodium chloride 0.9%. 1000 milliLiter(s) (100 mL/Hr) IV Continuous <Continuous>    MEDICATIONS  (PRN):  acetaminophen     Tablet .. 650 milliGRAM(s) Oral every 6 hours PRN Temp greater or equal to 38C (100.4F), Mild Pain (1 - 3)  ondansetron Injectable 4 milliGRAM(s) IV Push every 6 hours PRN Nausea  senna 2 Tablet(s) Oral at bedtime PRN Constipation        REVIEW OF SYSTEMS  General:  No weight loss, fevers, or chills.  Eyes:  No reported pain or visual changes  ENT:  No sore throat or runny nose.  NECK: No stiffness or lymphadenopathy  CV:  No chest pain or palpitations.  Resp:  No shortness of breath, cough, wheezing or hemoptysis  GI:  No abdominal pain, nausea, vomiting, dysphagia, diarrhea or constipation. +rectal bleeding, no melena, or hematemesis.  Muscle:  No aches or weakness  Neuro:  No tingling, numbness       VITALS:   T(F): 96.8 (02-26-24 @ 03:41), Max: 98.8 (02-25-24 @ 15:32)  HR: 69 (02-26-24 @ 03:41) (69 - 91)  BP: 142/73 (02-26-24 @ 03:41) (129/88 - 142/73)  RR: 18 (02-26-24 @ 03:41) (18 - 20)  SpO2: 97% (02-26-24 @ 03:41) (96% - 97%)    PHYSICAL EXAM:  GENERAL: AAOx3, no acute distress.  HEAD:  Atraumatic, Normocephalic  EYES: conjunctiva and sclera clear  NECK: Supple, No thyromegaly   CHEST/LUNG: Clear to auscultation bilaterally; No wheeze, rhonchi, or rales  HEART: Regular rate and rhythm; normal S1, S2, No murmurs.  ABDOMEN: Soft, nontender, nondistended; Bowel sounds present  NEUROLOGY: No asterixis or tremor  SKIN: Intact, no jaundice  Rectal exam-brown stool in rectal vault        LABS:  02-25    134<L>  |  96<L>  |  20  ----------------------------<  94  4.9   |  25  |  1.2    Ca    9.0      25 Feb 2024 16:30    TPro  7.8  /  Alb  3.3<L>  /  TBili  0.2  /  DBili  x   /  AST  41  /  ALT  19  /  AlkPhos  141<H>  02-25                          11.0   5.39  )-----------( 413      ( 26 Feb 2024 04:29 )             33.1     LIVER FUNCTIONS - ( 25 Feb 2024 16:30 )  Alb: 3.3 g/dL / Pro: 7.8 g/dL / ALK PHOS: 141 U/L / ALT: 19 U/L / AST: 41 U/L / GGT: x           PT/INR - ( 25 Feb 2024 16:30 )   PT: 17.20 sec;   INR: 1.50 ratio         PTT - ( 25 Feb 2024 16:30 )  PTT:39.4 sec    IMAGING:    < from: CT Angio Abdomen and Pelvis w/ IV Cont (02.25.24 @ 19:30) >    ACC: [id number] EXAM:  CT ANGIO ABD PELV (W)AW IC   ORDERED BY: BHAVYA FARFAN     PROCEDURE DATE:  02/25/2024          INTERPRETATION:  CLINICAL STATEMENT: Rectal bleeding. History of renal   carcinoma    TECHNIQUE: Multislice helical sections wereobtained from the lower chest   to the pubic symphysis before intravenous contrast administration. CTA   images were subsequently obtained from the lower chest to the pubic   symphysis. 95 cc administered of Omnipaque 350 (5 cc discarded). Sagittal   and coronal reformatted images were acquired, as well as MIP   reconstructed images.    COMPARISON CT: Abdomen and pelvis 7/8/2022    OTHER STUDIES USED FOR CORRELATION: None.      FINDINGS:        LOWER CHEST: Trace pericardial fluid trace bilateralpleural effusion. No   suspicious basilar nodules.    HEPATOBILIARY: Interval development of multiple bilobar vascular   metastases.    SPLEEN: Post splenectomy.    PANCREAS: Unremarkable.    ADRENAL GLANDS: Post left adrenalectomy. Stable right adrenal gland.    KIDNEYS: Post left nephrectomy. No residual disease within the left   nephrectomy bed is noted. Prominent venous collaterals within the   surgical bed is noted.  Distended proximal right collecting system. No suspicious right renal   lesions. Multiple surgical clips within the adjacent retroperitoneum is   noted..    ABDOMINAL NODES: Unremarkable.    PERITONEUM/MESENTERY/BOWEL: Large amount of retained fecal material with   a diffusely distended colon is noted, consistent with fecal impaction..   No ascites or pneumoperitoneum    Within the left lower quadrant, inferior to multiple surgical clips and   adjacent to the left psoas muscle, a blush of contrast seen on arterial   phase imaging which pools further on delayed imaging (304/219, 309/77)   compatible with active bleeding site likely within proximal sigmoid colon.    BONES/SOFT TISSUES: A more lytic destructive lesion of the left sacrum,   consistent with metastatic disease (301/214)..    OTHER: Normal caliber patent abdominal aorta.      IMPRESSION:    Within the left lower quadrant, , a blush of contrast seen on arterial   phase imaging which pools further on delayed imaging (304/219, 309/77)   compatible with active bleeding site likely within proximal sigmoid colon.    more lytic destructive lesion of the left sacrum, consistent with   metastatic disease (301/214)..    Interval development of multiple bilobar vascular metastases    --- End of Report ---            KELBY BURNS MD; Attending Radiologist  Thisdocument has been electronically signed. Feb 25 2024  8:34PM    < end of copied text >

## 2024-02-26 NOTE — PROGRESS NOTE ADULT - SUBJECTIVE AND OBJECTIVE BOX
Progress note    INTERVAL HPI/OVERNIGHT EVENTS:    Patient seen and examined at bedside. No BM's while here. Denies lightheadedness, vision changes or dizziness.      REVIEW OF SYSTEMS:  All other 13 Review of systems were reviewed and are negative    FAMILY HISTORY:    T(C): 36 (02-26-24 @ 03:41), Max: 37.1 (02-25-24 @ 15:32)  HR: 69 (02-26-24 @ 03:41) (69 - 91)  BP: 142/73 (02-26-24 @ 03:41) (129/88 - 142/73)  RR: 18 (02-26-24 @ 03:41) (18 - 20)  SpO2: 97% (02-26-24 @ 03:41) (96% - 97%)  Wt(kg): --Vital Signs Last 24 Hrs  T(C): 36 (26 Feb 2024 03:41), Max: 37.1 (25 Feb 2024 15:32)  T(F): 96.8 (26 Feb 2024 03:41), Max: 98.8 (25 Feb 2024 15:32)  HR: 69 (26 Feb 2024 03:41) (69 - 91)  BP: 142/73 (26 Feb 2024 03:41) (129/88 - 142/73)  BP(mean): --  RR: 18 (26 Feb 2024 03:41) (18 - 20)  SpO2: 97% (26 Feb 2024 03:41) (96% - 97%)    Parameters below as of 26 Feb 2024 03:41  Patient On (Oxygen Delivery Method): room air      penicillins (Angioedema)  fish (Unknown)      PHYSICAL EXAM:    Constitutional: NAD, well-nourished, non toxic appearing   HEENT: Airway patent, moist MM, no erythema/swelling/deformity of oral structures. EOMI, PERRLA.  CV: regular rate, regular rhythm, well-perfused extremities, 2+ b/l DP and radial pulses equal.  Lungs: BCTA, no increased WOB.  ABD: NTND, no guarding or rebound, no pulsatile mass, no hernias.   MSK: Neck supple, nontender, nl ROM, no stepoff. Chest nontender. Back nontender in TLS spine or to b/l bony structures or flanks. Ext nontender, nl rom, no deformity.   INTEG: bilateral brown maculopapular rash in medial aspect of both thighs  NEURO: A&Ox3, normal strength, nl sensation throughout, normal speech.   PSYCH: Denies SI/HI/hallucinations    Consultant(s) Notes Reviewed:  [x ] YES  [ ] NO  Care Discussed with Consultants/Other Providers [ x] YES  [ ] NO    LABS:      RADIOLOGY & ADDITIONAL TESTS:    Imaging Personally Reviewed:  [ ] YES  [ ] NO  acetaminophen     Tablet .. 650 milliGRAM(s) Oral every 6 hours PRN  levothyroxine 25 MICROGram(s) Oral daily  ondansetron Injectable 4 milliGRAM(s) IV Push every 6 hours PRN  pantoprazole  Injectable 40 milliGRAM(s) IV Push daily  polyethylene glycol 3350 17 Gram(s) Oral two times a day  senna 2 Tablet(s) Oral at bedtime PRN  sodium chloride 0.9%. 1000 milliLiter(s) IV Continuous <Continuous>      HEALTH ISSUES - PROBLEM Dx:    67 yo F PMH of hypothyroidism, DVT (on eliquis), metastatic renal cell carcinoma s/p L nephrectomy, distal pancreatectomy and splenectomy on chemotherapy presents to ER with BRBPR.     #BRBPR  - CT: active bleeding site likely within proximal sigmoid colon  - admit to medicine, monitor on tele   - IR consulted - No intervention at current time   - GI consult for C-scope Wednesday  - clears  - IVF, Golytly tomorrow   - PPI qd, high suspicion for lower GI bleed  - trend h/h, t&s, transfuse prn     # H/o renal cell CA   - s/p  L nephrectomy  - on chemo (non formulary - instructed to bring from home)     # H/O DVT  - hold Eliquis     # H/O Hypothyroidism   - c/w synthroid     #dvt ppx - scds   # gi ppx   #clears  #full code

## 2024-02-26 NOTE — CONSULT NOTE ADULT - ASSESSMENT
68yFemale pmh hypothyroidism DVT 2019 on eliquis, metastatic renal cell carcinoma stage 4 s/p L nephrectomy, distal pancreatectomy and splenectomy on chemotherapy presents to ER with BRBPR Saturday night and Sunday. GI consulted for rectal bleeding and CTA positive. Colonoscopy 2019 showed diverticulosis as per patient     #rectal bleeding CTA positive  rectal exam-brown stool in rectal vault  Within the left lower quadrant, , a blush of contrast seen on arterial   phase imaging which pools further on delayed imaging (304/219, 309/77)   compatible with active bleeding site likely within proximal sigmoid colon.  -bleeding resolved, rectal exam-brown stool in vault  Rec  patient on eliquis based off GFR hold three days plan for procedure Wednesday  -clear liquids today and tomorrow  -Golytely prep tomorrow  -Maintain Hemodynamic Stability   -Monitor CBC  -CMP,Optimize Electrolytes  -PT,PTT,INR  -EKG, Chest-Xray   -Transfuse prn to hgb >8  -Two large bore IV lines  - PPI ppx  -Monitor Vital Signs  -Monitor Stool For blood, frequency, consistency, melena  -Active Type and Screen  -Iron Studies, Folate, Vitamin B12 levels     #more lytic destructive lesion of the left sacrum, consistent with   metastatic disease (301/214)..  #Interval development of multiple bilobar vascular metastases  Rec  - Care as per primary team

## 2024-02-26 NOTE — PATIENT PROFILE ADULT - FALL HARM RISK - HARM RISK INTERVENTIONS

## 2024-02-26 NOTE — CONSULT NOTE ADULT - NS ATTEND AMEND GEN_ALL_CORE FT
hematochezia. positive CTA. No more overt bleeding currently. Planned for colonosocpy on wednesday
IMPRESSION:  elderly W female w/ acute rectal bleeding. Pt's on AC for DVT        PLAN:    Pt currently stable does not need ICU adm  f/u subsequent CBC q 6 h  transfuse to keep Hgb > 8  hold AC   IV protonix  GI consult for colonoscopy & /or egd  npo except for meds for possible GI intervention

## 2024-02-27 LAB
ANION GAP SERPL CALC-SCNC: 13 MMOL/L — SIGNIFICANT CHANGE UP (ref 7–14)
BUN SERPL-MCNC: 15 MG/DL — SIGNIFICANT CHANGE UP (ref 10–20)
CALCIUM SERPL-MCNC: 7.7 MG/DL — LOW (ref 8.4–10.5)
CHLORIDE SERPL-SCNC: 104 MMOL/L — SIGNIFICANT CHANGE UP (ref 98–110)
CO2 SERPL-SCNC: 21 MMOL/L — SIGNIFICANT CHANGE UP (ref 17–32)
CREAT SERPL-MCNC: 1 MG/DL — SIGNIFICANT CHANGE UP (ref 0.7–1.5)
EGFR: 62 ML/MIN/1.73M2 — SIGNIFICANT CHANGE UP
GLUCOSE SERPL-MCNC: 65 MG/DL — LOW (ref 70–99)
HCT VFR BLD CALC: 31.1 % — LOW (ref 37–47)
HGB BLD-MCNC: 10.4 G/DL — LOW (ref 12–16)
MCHC RBC-ENTMCNC: 31.9 PG — HIGH (ref 27–31)
MCHC RBC-ENTMCNC: 33.4 G/DL — SIGNIFICANT CHANGE UP (ref 32–37)
MCV RBC AUTO: 95.4 FL — SIGNIFICANT CHANGE UP (ref 81–99)
NRBC # BLD: 0 /100 WBCS — SIGNIFICANT CHANGE UP (ref 0–0)
PLATELET # BLD AUTO: 435 K/UL — HIGH (ref 130–400)
PMV BLD: 10.3 FL — SIGNIFICANT CHANGE UP (ref 7.4–10.4)
POTASSIUM SERPL-MCNC: 4.3 MMOL/L — SIGNIFICANT CHANGE UP (ref 3.5–5)
POTASSIUM SERPL-SCNC: 4.3 MMOL/L — SIGNIFICANT CHANGE UP (ref 3.5–5)
RBC # BLD: 3.26 M/UL — LOW (ref 4.2–5.4)
RBC # FLD: 17.3 % — HIGH (ref 11.5–14.5)
SODIUM SERPL-SCNC: 138 MMOL/L — SIGNIFICANT CHANGE UP (ref 135–146)
WBC # BLD: 5.01 K/UL — SIGNIFICANT CHANGE UP (ref 4.8–10.8)
WBC # FLD AUTO: 5.01 K/UL — SIGNIFICANT CHANGE UP (ref 4.8–10.8)

## 2024-02-27 PROCEDURE — 99233 SBSQ HOSP IP/OBS HIGH 50: CPT

## 2024-02-27 PROCEDURE — 99232 SBSQ HOSP IP/OBS MODERATE 35: CPT

## 2024-02-27 RX ORDER — SALICYLIC ACID 0.5 %
1 CLEANSER (GRAM) TOPICAL
Refills: 0 | Status: DISCONTINUED | OUTPATIENT
Start: 2024-02-27 | End: 2024-02-28

## 2024-02-27 RX ADMIN — POLYETHYLENE GLYCOL 3350 17 GRAM(S): 17 POWDER, FOR SOLUTION ORAL at 17:18

## 2024-02-27 RX ADMIN — Medication 25 MICROGRAM(S): at 05:19

## 2024-02-27 RX ADMIN — Medication 1 APPLICATION(S): at 17:15

## 2024-02-27 RX ADMIN — AXITINIB 5 MILLIGRAM(S): 1 TABLET, FILM COATED ORAL at 09:27

## 2024-02-27 RX ADMIN — PANTOPRAZOLE SODIUM 40 MILLIGRAM(S): 20 TABLET, DELAYED RELEASE ORAL at 13:16

## 2024-02-27 RX ADMIN — POLYETHYLENE GLYCOL 3350 17 GRAM(S): 17 POWDER, FOR SOLUTION ORAL at 05:21

## 2024-02-27 NOTE — PROGRESS NOTE ADULT - SUBJECTIVE AND OBJECTIVE BOX
68yFemale  Being followed for positive CTA  Interval history: Patient denies nausea, vomiting, hematemesis, melena, blood in stool, diarrhea, constipation, abdominal pain. Patient with one bm today with no blood in it, tolerating clear liquids.      PAST MEDICAL & SURGICAL HISTORY:   Renal cell carcinoma      Hypertension      Breast cancer      H/O left nephrectomy      H/O partial adrenalectomy      History of partial pancreatectomy      S/P splenectomy      H/O lumpectomy                Social History: No smoking. No alcohol. No illegal drug use.          MEDICATIONS  (STANDING):  axitinib 5 milliGRAM(s) Oral every 12 hours  levothyroxine 25 MICROGram(s) Oral daily  pantoprazole  Injectable 40 milliGRAM(s) IV Push daily  polyethylene glycol 3350 17 Gram(s) Oral two times a day  polyethylene glycol/electrolyte Solution. 4000 milliLiter(s) Oral once  sodium chloride 0.9%. 1000 milliLiter(s) (100 mL/Hr) IV Continuous <Continuous>    MEDICATIONS  (PRN):  acetaminophen     Tablet .. 650 milliGRAM(s) Oral every 6 hours PRN Temp greater or equal to 38C (100.4F), Mild Pain (1 - 3)  ondansetron Injectable 4 milliGRAM(s) IV Push every 6 hours PRN Nausea  senna 2 Tablet(s) Oral at bedtime PRN Constipation      Allergies:  penicillins (Angioedema)  fish (Unknown)  Intolerances          REVIEW OF SYSTEMS:  General:  No weight loss, fevers, or chills.  Eyes:  No reported pain or visual changes  ENT:  No sore throat or runny nose.  NECK: No stiffness   CV:  No chest pain or palpitations.  Resp:  No shortness of breath, cough  GI:  No abdominal pain, nausea, vomiting, dysphagia, diarrhea or constipation. No rectal bleeding, melena, or hematemesis.  Muscle:  No aches or weakness  Neuro:  No tingling, numbness           VITAL SIGNS:   T(F): 98.3 (02-27-24 @ 04:02), Max: 98.3 (02-26-24 @ 20:00)  HR: 61 (02-27-24 @ 04:02) (61 - 70)  BP: 113/69 (02-27-24 @ 04:02) (113/69 - 136/80)  RR: 16 (02-27-24 @ 04:02) (16 - 18)  SpO2: 97% (02-27-24 @ 04:02) (97% - 97%)    PHYSICAL EXAM:  GENERAL: AAOx3, no acute distress.  HEAD:  Atraumatic, Normocephalic  EYES: conjunctiva and sclera clear  NECK: Supple, no JVD or thyromegaly  CHEST/LUNG: Clear to auscultation bilaterally; No wheeze, rhonchi, or rales  HEART: Regular rate and rhythm; normal S1, S2, No murmurs.  ABDOMEN: Soft, nontender, nondistended; Bowel sounds present  NEUROLOGY: No asterixis or tremor.   SKIN: Intact, no jaundice            LABS:                        10.4   5.01  )-----------( 435      ( 27 Feb 2024 07:11 )             31.1     02-27    138  |  104  |  15  ----------------------------<  65<L>  4.3   |  21  |  1.0    Ca    7.7<L>      27 Feb 2024 07:11    TPro  6.5  /  Alb  3.0<L>  /  TBili  0.3  /  DBili  x   /  AST  27  /  ALT  15  /  AlkPhos  126<H>  02-26    LIVER FUNCTIONS - ( 26 Feb 2024 11:15 )  Alb: 3.0 g/dL / Pro: 6.5 g/dL / ALK PHOS: 126 U/L / ALT: 15 U/L / AST: 27 U/L / GGT: x           PT/INR - ( 25 Feb 2024 16:30 )   PT: 17.20 sec;   INR: 1.50 ratio         PTT - ( 25 Feb 2024 16:30 )  PTT:39.4 sec    IMAGING:    < from: CT Angio Abdomen and Pelvis w/ IV Cont (02.25.24 @ 19:30) >    ACC: [id number] EXAM:  CT ANGIO ABD PELV (W)AW IC   ORDERED BY: BHAVYA FARFAN     PROCEDURE DATE:  02/25/2024          INTERPRETATION:  CLINICAL STATEMENT: Rectal bleeding. History of renal   carcinoma    TECHNIQUE: Multislice helical sections wereobtained from the lower chest   to the pubic symphysis before intravenous contrast administration. CTA   images were subsequently obtained from the lower chest to the pubic   symphysis. 95 cc administered of Omnipaque 350 (5 cc discarded). Sagittal   and coronal reformatted images were acquired, as well as MIP   reconstructed images.    COMPARISON CT: Abdomen and pelvis 7/8/2022    OTHER STUDIES USED FOR CORRELATION: None.      FINDINGS:        LOWER CHEST: Trace pericardial fluid trace bilateralpleural effusion. No   suspicious basilar nodules.    HEPATOBILIARY: Interval development of multiple bilobar vascular   metastases.    SPLEEN: Post splenectomy.    PANCREAS: Unremarkable.    ADRENAL GLANDS: Post left adrenalectomy. Stable right adrenal gland.    KIDNEYS: Post left nephrectomy. No residual disease within the left   nephrectomy bed is noted. Prominent venous collaterals within the   surgical bed is noted.  Distended proximal right collecting system. No suspicious right renal   lesions. Multiple surgical clips within the adjacent retroperitoneum is   noted..    ABDOMINAL NODES: Unremarkable.    PERITONEUM/MESENTERY/BOWEL: Large amount of retained fecal material with   a diffusely distended colon is noted, consistent with fecal impaction..   No ascites or pneumoperitoneum    Within the left lower quadrant, inferior to multiple surgical clips and   adjacent to the left psoas muscle, a blush of contrast seen on arterial   phase imaging which pools further on delayed imaging (304/219, 309/77)   compatible with active bleeding site likely within proximal sigmoid colon.    BONES/SOFT TISSUES: A more lytic destructive lesion of the left sacrum,   consistent with metastatic disease (301/214)..    OTHER: Normal caliber patent abdominal aorta.      IMPRESSION:    Within the left lower quadrant, , a blush of contrast seen on arterial   phase imaging which pools further on delayed imaging (304/219, 309/77)   compatible with active bleeding site likely within proximal sigmoid colon.    more lytic destructive lesion of the left sacrum, consistent with   metastatic disease (301/214)..    Interval development of multiple bilobar vascular metastases    --- End of Report ---            KELBY BURNS MD; Attending Radiologist  Thisdocument has been electronically signed. Feb 25 2024  8:34PM    < end of copied text >

## 2024-02-27 NOTE — PROGRESS NOTE ADULT - SUBJECTIVE AND OBJECTIVE BOX
Progress note    INTERVAL HPI/OVERNIGHT EVENTS:    Patient seen and examined at bedside. No acute distress. Had a BM this morning, no blood but reports blood on toilet paper. No lightheadedness or dizziness.      REVIEW OF SYSTEMS:  All other 13 Review of systems were reviewed and are negative    FAMILY HISTORY:    T(C): 36.8 (02-27-24 @ 04:02), Max: 36.8 (02-26-24 @ 20:00)  HR: 61 (02-27-24 @ 04:02) (61 - 70)  BP: 113/69 (02-27-24 @ 04:02) (113/69 - 136/80)  RR: 16 (02-27-24 @ 04:02) (16 - 18)  SpO2: 97% (02-27-24 @ 04:02) (97% - 97%)  Wt(kg): --Vital Signs Last 24 Hrs  T(C): 36.8 (27 Feb 2024 04:02), Max: 36.8 (26 Feb 2024 20:00)  T(F): 98.3 (27 Feb 2024 04:02), Max: 98.3 (26 Feb 2024 20:00)  HR: 61 (27 Feb 2024 04:02) (61 - 70)  BP: 113/69 (27 Feb 2024 04:02) (113/69 - 136/80)  BP(mean): --  RR: 16 (27 Feb 2024 04:02) (16 - 18)  SpO2: 97% (27 Feb 2024 04:02) (97% - 97%)    Parameters below as of 26 Feb 2024 20:00  Patient On (Oxygen Delivery Method): room air      penicillins (Angioedema)  fish (Unknown)      PHYSICAL EXAM:    Constitutional: NAD, well-nourished, non toxic appearing   HEENT: Airway patent, moist MM, no erythema/swelling/deformity of oral structures. EOMI, PERRLA.  CV: regular rate, regular rhythm, well-perfused extremities, 2+ b/l DP and radial pulses equal.  Lungs: BCTA, no increased WOB.  ABD: NTND, no guarding or rebound, no pulsatile mass, no hernias.   MSK: Neck supple, nontender, nl ROM, no stepoff. Chest nontender. Back nontender in TLS spine or to b/l bony structures or flanks. Ext nontender, nl rom, no deformity.   INTEG: bilateral maculopapular rash in medial aspect of both thighs, improved now leaving an erythematous base  NEURO: A&Ox3, normal strength, nl sensation throughout, normal speech.   PSYCH: Denies SI/HI/hallucinations    Consultant(s) Notes Reviewed:  [x ] YES  [ ] NO  Care Discussed with Consultants/Other Providers [ x] YES  [ ] NO    LABS:      RADIOLOGY & ADDITIONAL TESTS:    Imaging Personally Reviewed:  [ ] YES  [ ] NO  acetaminophen     Tablet .. 650 milliGRAM(s) Oral every 6 hours PRN  axitinib 5 milliGRAM(s) Oral every 12 hours  levothyroxine 25 MICROGram(s) Oral daily  ondansetron Injectable 4 milliGRAM(s) IV Push every 6 hours PRN  pantoprazole  Injectable 40 milliGRAM(s) IV Push daily  polyethylene glycol 3350 17 Gram(s) Oral two times a day  polyethylene glycol/electrolyte Solution. 4000 milliLiter(s) Oral once  senna 2 Tablet(s) Oral at bedtime PRN  sodium chloride 0.9%. 1000 milliLiter(s) IV Continuous <Continuous>      HEALTH ISSUES - PROBLEM Dx:    69 yo F PMH of hypothyroidism, DVT (on eliquis), metastatic renal cell carcinoma s/p L nephrectomy, distal pancreatectomy and splenectomy on chemotherapy presents to ER with BRBPR.     #BRBPR  - CT: active bleeding site likely within proximal sigmoid colon  - admit to medicine, monitor on tele   - IR consulted - No intervention at current time   - GI consult for C-scope Wednesday  - clears  - IVF, Golytly today  - PPI qd  - trend h/h, t&s, transfuse prn     # H/o renal cell CA   - s/p  L nephrectomy  - on chemo (non formulary - instructed to bring from home)     #B/l medial LE rash  -Recently started 1 day prior to admission  -D/w derm with photograph, recommended trial of vitamin A and D topical    # H/O DVT  - hold Eliquis     # H/O Hypothyroidism   - c/w synthroid     #dvt ppx - scds   # gi ppx   #clears  #full code     Handoff: here for lower GI bleed. Plan for c-scope tomorrow. Possible dc tomorrow after procedure or Thursday       Progress note    INTERVAL HPI/OVERNIGHT EVENTS:    Patient seen and examined at bedside. No acute distress. Had a BM this morning, no blood but reports blood on toilet paper. No lightheadedness or dizziness.      REVIEW OF SYSTEMS:  All other 13 Review of systems were reviewed and are negative    FAMILY HISTORY:    T(C): 36.8 (02-27-24 @ 04:02), Max: 36.8 (02-26-24 @ 20:00)  HR: 61 (02-27-24 @ 04:02) (61 - 70)  BP: 113/69 (02-27-24 @ 04:02) (113/69 - 136/80)  RR: 16 (02-27-24 @ 04:02) (16 - 18)  SpO2: 97% (02-27-24 @ 04:02) (97% - 97%)  Wt(kg): --Vital Signs Last 24 Hrs  T(C): 36.8 (27 Feb 2024 04:02), Max: 36.8 (26 Feb 2024 20:00)  T(F): 98.3 (27 Feb 2024 04:02), Max: 98.3 (26 Feb 2024 20:00)  HR: 61 (27 Feb 2024 04:02) (61 - 70)  BP: 113/69 (27 Feb 2024 04:02) (113/69 - 136/80)  BP(mean): --  RR: 16 (27 Feb 2024 04:02) (16 - 18)  SpO2: 97% (27 Feb 2024 04:02) (97% - 97%)    Parameters below as of 26 Feb 2024 20:00  Patient On (Oxygen Delivery Method): room air      penicillins (Angioedema)  fish (Unknown)      PHYSICAL EXAM:    Constitutional: NAD, well-nourished, non toxic appearing   HEENT: Airway patent, moist MM, no erythema/swelling/deformity of oral structures. EOMI, PERRLA.  CV: regular rate, regular rhythm, well-perfused extremities, 2+ b/l DP and radial pulses equal.  Lungs: BCTA, no increased WOB.  ABD: NTND, no guarding or rebound, no pulsatile mass, no hernias.   MSK: Neck supple, nontender, nl ROM, no stepoff. Chest nontender. Back nontender in TLS spine or to b/l bony structures or flanks. Ext nontender, nl rom, no deformity.   INTEG: bilateral maculopapular rash in medial aspect of both thighs, improved now leaving an erythematous base  NEURO: A&Ox3, normal strength, nl sensation throughout, normal speech.   PSYCH: Denies SI/HI/hallucinations    Consultant(s) Notes Reviewed:  [x ] YES  [ ] NO  Care Discussed with Consultants/Other Providers [ x] YES  [ ] NO    LABS:      RADIOLOGY & ADDITIONAL TESTS:    Imaging Personally Reviewed:  [ ] YES  [ ] NO  acetaminophen     Tablet .. 650 milliGRAM(s) Oral every 6 hours PRN  axitinib 5 milliGRAM(s) Oral every 12 hours  levothyroxine 25 MICROGram(s) Oral daily  ondansetron Injectable 4 milliGRAM(s) IV Push every 6 hours PRN  pantoprazole  Injectable 40 milliGRAM(s) IV Push daily  polyethylene glycol 3350 17 Gram(s) Oral two times a day  polyethylene glycol/electrolyte Solution. 4000 milliLiter(s) Oral once  senna 2 Tablet(s) Oral at bedtime PRN  sodium chloride 0.9%. 1000 milliLiter(s) IV Continuous <Continuous>      HEALTH ISSUES - PROBLEM Dx:    67 yo F PMH of hypothyroidism, DVT (on eliquis), metastatic renal cell carcinoma s/p L nephrectomy, distal pancreatectomy and splenectomy on chemotherapy presents to ER with BRBPR.     #BRBPR  - CT: active bleeding site likely within proximal sigmoid colon  - admit to medicine, monitor on tele   - IR consulted - No intervention at current time   - GI consult for C-scope Wednesday  - clears  - IVF, Golytly today  - PPI qd  - trend h/h, t&s, transfuse prn     # H/o renal cell CA   - s/p  L nephrectomy  - Per outpatient Hematologist/oncologist, patient should hold chemotherapy while off Eliquis    #B/l medial LE rash  -Recently started 1 day prior to admission  -D/w derm with photograph, recommended trial of vitamin A and D topical    # H/O DVT  - hold Eliquis     # H/O Hypothyroidism   - c/w synthroid     #dvt ppx - scds   # gi ppx   #clears  #full code     Handoff: here for lower GI bleed. Plan for c-scope tomorrow. Possible dc tomorrow after procedure or Thursday

## 2024-02-28 ENCOUNTER — TRANSCRIPTION ENCOUNTER (OUTPATIENT)
Age: 69
End: 2024-02-28

## 2024-02-28 LAB
ANION GAP SERPL CALC-SCNC: 15 MMOL/L — HIGH (ref 7–14)
APTT BLD: 39.2 SEC — SIGNIFICANT CHANGE UP (ref 27–39.2)
BLD GP AB SCN SERPL QL: SIGNIFICANT CHANGE UP
BUN SERPL-MCNC: 10 MG/DL — SIGNIFICANT CHANGE UP (ref 10–20)
CALCIUM SERPL-MCNC: 7.7 MG/DL — LOW (ref 8.4–10.5)
CHLORIDE SERPL-SCNC: 101 MMOL/L — SIGNIFICANT CHANGE UP (ref 98–110)
CO2 SERPL-SCNC: 22 MMOL/L — SIGNIFICANT CHANGE UP (ref 17–32)
CREAT SERPL-MCNC: 1 MG/DL — SIGNIFICANT CHANGE UP (ref 0.7–1.5)
EGFR: 62 ML/MIN/1.73M2 — SIGNIFICANT CHANGE UP
GLUCOSE SERPL-MCNC: 63 MG/DL — LOW (ref 70–99)
HCT VFR BLD CALC: 37.1 % — SIGNIFICANT CHANGE UP (ref 37–47)
HGB BLD-MCNC: 12 G/DL — SIGNIFICANT CHANGE UP (ref 12–16)
INR BLD: 1.31 RATIO — HIGH (ref 0.65–1.3)
MCHC RBC-ENTMCNC: 31.7 PG — HIGH (ref 27–31)
MCHC RBC-ENTMCNC: 32.3 G/DL — SIGNIFICANT CHANGE UP (ref 32–37)
MCV RBC AUTO: 97.9 FL — SIGNIFICANT CHANGE UP (ref 81–99)
NRBC # BLD: 0 /100 WBCS — SIGNIFICANT CHANGE UP (ref 0–0)
PLATELET # BLD AUTO: 307 K/UL — SIGNIFICANT CHANGE UP (ref 130–400)
PMV BLD: 11.2 FL — HIGH (ref 7.4–10.4)
POTASSIUM SERPL-MCNC: 4.6 MMOL/L — SIGNIFICANT CHANGE UP (ref 3.5–5)
POTASSIUM SERPL-SCNC: 4.6 MMOL/L — SIGNIFICANT CHANGE UP (ref 3.5–5)
PROTHROM AB SERPL-ACNC: 15 SEC — HIGH (ref 9.95–12.87)
RBC # BLD: 3.79 M/UL — LOW (ref 4.2–5.4)
RBC # FLD: 17.4 % — HIGH (ref 11.5–14.5)
SODIUM SERPL-SCNC: 138 MMOL/L — SIGNIFICANT CHANGE UP (ref 135–146)
WBC # BLD: 5.13 K/UL — SIGNIFICANT CHANGE UP (ref 4.8–10.8)
WBC # FLD AUTO: 5.13 K/UL — SIGNIFICANT CHANGE UP (ref 4.8–10.8)

## 2024-02-28 PROCEDURE — 45330 DIAGNOSTIC SIGMOIDOSCOPY: CPT

## 2024-02-28 PROCEDURE — 99231 SBSQ HOSP IP/OBS SF/LOW 25: CPT

## 2024-02-28 RX ORDER — SENNA PLUS 8.6 MG/1
2 TABLET ORAL AT BEDTIME
Refills: 0 | Status: DISCONTINUED | OUTPATIENT
Start: 2024-02-28 | End: 2024-02-29

## 2024-02-28 RX ORDER — SALICYLIC ACID 0.5 %
1 CLEANSER (GRAM) TOPICAL
Qty: 0 | Refills: 0 | DISCHARGE
Start: 2024-02-28

## 2024-02-28 RX ORDER — SODIUM CHLORIDE 9 MG/ML
1000 INJECTION, SOLUTION INTRAVENOUS
Refills: 0 | Status: DISCONTINUED | OUTPATIENT
Start: 2024-02-28 | End: 2024-02-28

## 2024-02-28 RX ORDER — LEVOTHYROXINE SODIUM 125 MCG
25 TABLET ORAL DAILY
Refills: 0 | Status: DISCONTINUED | OUTPATIENT
Start: 2024-02-28 | End: 2024-02-29

## 2024-02-28 RX ORDER — ONDANSETRON 8 MG/1
4 TABLET, FILM COATED ORAL ONCE
Refills: 0 | Status: DISCONTINUED | OUTPATIENT
Start: 2024-02-28 | End: 2024-02-28

## 2024-02-28 RX ORDER — SALICYLIC ACID 0.5 %
1 CLEANSER (GRAM) TOPICAL
Refills: 0 | Status: DISCONTINUED | OUTPATIENT
Start: 2024-02-28 | End: 2024-02-29

## 2024-02-28 RX ORDER — POLYETHYLENE GLYCOL 3350 17 G/17G
17 POWDER, FOR SOLUTION ORAL
Refills: 0 | Status: DISCONTINUED | OUTPATIENT
Start: 2024-02-28 | End: 2024-02-29

## 2024-02-28 RX ORDER — SODIUM CHLORIDE 9 MG/ML
1000 INJECTION INTRAMUSCULAR; INTRAVENOUS; SUBCUTANEOUS
Refills: 0 | Status: DISCONTINUED | OUTPATIENT
Start: 2024-02-28 | End: 2024-02-28

## 2024-02-28 RX ORDER — PANTOPRAZOLE SODIUM 20 MG/1
40 TABLET, DELAYED RELEASE ORAL DAILY
Refills: 0 | Status: DISCONTINUED | OUTPATIENT
Start: 2024-02-28 | End: 2024-02-29

## 2024-02-28 RX ORDER — HYDROMORPHONE HYDROCHLORIDE 2 MG/ML
0.5 INJECTION INTRAMUSCULAR; INTRAVENOUS; SUBCUTANEOUS
Refills: 0 | Status: DISCONTINUED | OUTPATIENT
Start: 2024-02-28 | End: 2024-02-28

## 2024-02-28 RX ORDER — ACETAMINOPHEN 500 MG
650 TABLET ORAL EVERY 6 HOURS
Refills: 0 | Status: DISCONTINUED | OUTPATIENT
Start: 2024-02-28 | End: 2024-02-29

## 2024-02-28 RX ORDER — APIXABAN 2.5 MG/1
2.5 TABLET, FILM COATED ORAL EVERY 12 HOURS
Refills: 0 | Status: DISCONTINUED | OUTPATIENT
Start: 2024-02-28 | End: 2024-02-29

## 2024-02-28 RX ADMIN — SODIUM CHLORIDE 100 MILLILITER(S): 9 INJECTION INTRAMUSCULAR; INTRAVENOUS; SUBCUTANEOUS at 12:40

## 2024-02-28 RX ADMIN — POLYETHYLENE GLYCOL 3350 17 GRAM(S): 17 POWDER, FOR SOLUTION ORAL at 17:49

## 2024-02-28 RX ADMIN — APIXABAN 2.5 MILLIGRAM(S): 2.5 TABLET, FILM COATED ORAL at 17:38

## 2024-02-28 RX ADMIN — SODIUM CHLORIDE 100 MILLILITER(S): 9 INJECTION INTRAMUSCULAR; INTRAVENOUS; SUBCUTANEOUS at 15:49

## 2024-02-28 RX ADMIN — PANTOPRAZOLE SODIUM 40 MILLIGRAM(S): 20 TABLET, DELAYED RELEASE ORAL at 12:39

## 2024-02-28 RX ADMIN — POLYETHYLENE GLYCOL 3350 17 GRAM(S): 17 POWDER, FOR SOLUTION ORAL at 05:17

## 2024-02-28 RX ADMIN — Medication 25 MICROGRAM(S): at 05:17

## 2024-02-28 RX ADMIN — Medication 1 APPLICATION(S): at 17:38

## 2024-02-28 RX ADMIN — Medication 1 APPLICATION(S): at 05:17

## 2024-02-28 NOTE — CHART NOTE - NSCHARTNOTEFT_GEN_A_CORE
As per Dr. Rosales who further spoke to GI will restart Eliquis today.   Repeat CBC in AM.
patient for GI workup today
-all discontinued via transfer orders reinitiated and Hospitalist MUST review ALL orders to assure they are accurate and correct   -Please refer to sunrise results section for Colonoscopy findings and recommendations   -patient with poor prep but no bleeding in Colon  -please resume AC, can repeat Colonoscopy as indicated outpatient

## 2024-02-28 NOTE — DISCHARGE NOTE PROVIDER - PROVIDER TOKENS
PROVIDER:[TOKEN:[[id number]],FOLLOWUP:[2 weeks]],FREE:[LAST:[Oncologist],PHONE:[(   )    -],FAX:[(   )    -],FOLLOWUP:[1 week]]

## 2024-02-28 NOTE — DISCHARGE NOTE PROVIDER - CARE PROVIDER_API CALL
Jes Marrero  Gastroenterology  [address] [facility] [place] [address]  Phone: [phone]  Fax: [phone]  Follow Up Time: 2 weeks    Oncologist,   Phone: (   )    -  Fax: (   )    -  Follow Up Time: 1 week

## 2024-02-28 NOTE — DISCHARGE NOTE PROVIDER - NSDCCPCAREPLAN_GEN_ALL_CORE_FT
PRINCIPAL DISCHARGE DIAGNOSIS  Diagnosis: GI bleed  Assessment and Plan of Treatment: 67 yo F PMH of hypothyroidism, DVT (on eliquis), metastatic renal cell carcinoma s/p L nephrectomy, distal pancreatectomy and splenectomy on chemotherapy presents to ER with BRBPR. Patient evaluated by GI after having a positive CTA and underwent colonoscopy that showed resolution of bleeding and brown stool. Patient H/H have been stable and she is medically optimized for discharge with out-pt GI and PCP f/u.   Things to do:  - out-patient f/u with GI clinic and Oncologist  - C-scope negative for active bleeding with brown stool noted; poor prep and can consider repeat outpt C-scope if indicated   - continue with home medication regimen   - trial of vitamin A and D topical for rash        SECONDARY DISCHARGE DIAGNOSES  Diagnosis: Renal cancer  Assessment and Plan of Treatment:

## 2024-02-28 NOTE — DISCHARGE NOTE PROVIDER - HOSPITAL COURSE
69 yo F PMH of hypothyroidism, DVT (on eliquis), metastatic renal cell carcinoma s/p L nephrectomy, distal pancreatectomy and splenectomy on chemotherapy presents to ER with BRBPR. Patient evaluated by GI after having a positive CTA and underwent colonoscopy that showed resolution of bleeding and brown stool. Patient H/H have been stable and she is medically optimized for discharge with out-pt GI and PCP f/u.     #BRBPR  - CT: active bleeding site likely within proximal sigmoid colon  - IR consulted - No intervention at current time   - C-scope negative for active bleeding with brown stool noted; poor prep and can consider repeat outpt C-scope if indicated   - PPI qd  - trend h/h, t&s, transfuse prn     # H/o renal cell CA   - s/p  L nephrectomy  - Per outpatient Hematologist/oncologist, patient should hold chemotherapy while off Eliquis  - Eliquis restarted post C-scope    #B/l medial LE rash  -Recently started 1 day prior to admission  -D/w derm with photograph, recommended trial of vitamin A and D topical    # H/O DVT  - c/w Eliquis     # H/O Hypothyroidism   - c/w synthroid     #dvt ppx - scds   # gi ppx   #clears  #full code

## 2024-02-28 NOTE — DISCHARGE NOTE PROVIDER - ATTENDING DISCHARGE PHYSICAL EXAMINATION:
Vital Signs Last 24 Hrs  T(C): 36.3 (28 Feb 2024 20:48), Max: 36.6 (28 Feb 2024 04:54)  T(F): 97.3 (28 Feb 2024 20:48), Max: 97.9 (28 Feb 2024 04:54)  HR: 70 (28 Feb 2024 20:48) (65 - 72)  BP: 147/83 (28 Feb 2024 20:48) (101/65 - 147/83)  BP(mean): --  RR: 18 (28 Feb 2024 20:48) (14 - 18)  SpO2: 98% (28 Feb 2024 20:48) (96% - 99%)    Parameters below as of 28 Feb 2024 20:48  Patient On (Oxygen Delivery Method): room air    Constitutional: NAD, well-nourished, non toxic appearing   HEENT: Airway patent, moist MM, no erythema/swelling/deformity of oral structures. EOMI, PERRLA.  CV: regular rate, regular rhythm, well-perfused extremities, 2+ b/l DP and radial pulses equal.  Lungs: BCTA, no increased WOB.  ABD: NTND, no guarding or rebound, no pulsatile mass, no hernias.   MSK: Neck supple, nontender, nl ROM, no stepoff. Chest nontender. Back nontender in TLS spine or to b/l bony structures or flanks. Ext nontender, nl rom, no deformity.   INTEG: bilateral maculopapular rash in medial aspect of both thighs, improved now leaving an erythematous base  NEURO: A&Ox3, normal strength, nl sensation throughout, normal speech.   PSYCH: Denies SI/HI/hallucinations

## 2024-02-28 NOTE — DISCHARGE NOTE PROVIDER - NSDCMRMEDTOKEN_GEN_ALL_CORE_FT
AMLODIPINE BESYLATE 5 MG TAB:   CALCIUM 600 MG-VIT D3 5 MCG TB: 1 tab(s) orally once a day  ELIQUIS 2.5 MG TABLET: 1 tab(s) orally 2 times a day  ESCITALOPRAM 5 MG TABLET:   EVEROLIMUS 5 MG TABLET:   INLYTA 5 MG TABLET:   LENVIMA 18 MG DAILY DOSE:   SYNTHROID 25 MCG TABLET: 1 tab(s) orally once a day  vitamin A and D topical ointment: 1 Apply topically to affected area 2 times a day

## 2024-02-29 ENCOUNTER — TRANSCRIPTION ENCOUNTER (OUTPATIENT)
Age: 69
End: 2024-02-29

## 2024-02-29 VITALS
RESPIRATION RATE: 18 BRPM | OXYGEN SATURATION: 98 % | DIASTOLIC BLOOD PRESSURE: 62 MMHG | HEART RATE: 80 BPM | TEMPERATURE: 97 F | SYSTOLIC BLOOD PRESSURE: 116 MMHG

## 2024-02-29 LAB
HCT VFR BLD CALC: 34.9 % — LOW (ref 37–47)
HGB BLD-MCNC: 11.7 G/DL — LOW (ref 12–16)
MCHC RBC-ENTMCNC: 31.8 PG — HIGH (ref 27–31)
MCHC RBC-ENTMCNC: 33.5 G/DL — SIGNIFICANT CHANGE UP (ref 32–37)
MCV RBC AUTO: 94.8 FL — SIGNIFICANT CHANGE UP (ref 81–99)
NRBC # BLD: 0 /100 WBCS — SIGNIFICANT CHANGE UP (ref 0–0)
PLATELET # BLD AUTO: 409 K/UL — HIGH (ref 130–400)
PMV BLD: 10.2 FL — SIGNIFICANT CHANGE UP (ref 7.4–10.4)
RBC # BLD: 3.68 M/UL — LOW (ref 4.2–5.4)
RBC # FLD: 17.3 % — HIGH (ref 11.5–14.5)
WBC # BLD: 5.69 K/UL — SIGNIFICANT CHANGE UP (ref 4.8–10.8)
WBC # FLD AUTO: 5.69 K/UL — SIGNIFICANT CHANGE UP (ref 4.8–10.8)

## 2024-02-29 PROCEDURE — 99231 SBSQ HOSP IP/OBS SF/LOW 25: CPT

## 2024-02-29 PROCEDURE — 99233 SBSQ HOSP IP/OBS HIGH 50: CPT

## 2024-02-29 RX ADMIN — Medication 1 APPLICATION(S): at 17:48

## 2024-02-29 RX ADMIN — Medication 25 MICROGRAM(S): at 05:02

## 2024-02-29 RX ADMIN — POLYETHYLENE GLYCOL 3350 17 GRAM(S): 17 POWDER, FOR SOLUTION ORAL at 17:49

## 2024-02-29 RX ADMIN — POLYETHYLENE GLYCOL 3350 17 GRAM(S): 17 POWDER, FOR SOLUTION ORAL at 05:01

## 2024-02-29 RX ADMIN — APIXABAN 2.5 MILLIGRAM(S): 2.5 TABLET, FILM COATED ORAL at 17:48

## 2024-02-29 RX ADMIN — APIXABAN 2.5 MILLIGRAM(S): 2.5 TABLET, FILM COATED ORAL at 05:02

## 2024-02-29 RX ADMIN — PANTOPRAZOLE SODIUM 40 MILLIGRAM(S): 20 TABLET, DELAYED RELEASE ORAL at 12:12

## 2024-02-29 RX ADMIN — Medication 1 APPLICATION(S): at 05:02

## 2024-02-29 NOTE — PROGRESS NOTE ADULT - NS ATTEND AMEND GEN_ALL_CORE FT
I edited the note
SP attempted colonoscopy for hematochezia- No evidence of blood, on a poor prep exam. outpatient follow up at the [facility] to repeat colonoscopy

## 2024-02-29 NOTE — PROGRESS NOTE ADULT - ASSESSMENT
68yFemale pmh hypothyroidism DVT 2019 on eliquis, metastatic renal cell carcinoma stage 4 s/p L nephrectomy, distal pancreatectomy and splenectomy on chemotherapy presents to ER with BRBPR Saturday night and Sunday. GI consulted for rectal bleeding and CTA positive. Colonoscopy 2019 showed diverticulosis as per patient     #rectal bleeding CTA positive  rectal exam-brown stool in rectal vault  Within the left lower quadrant, , a blush of contrast seen on arterial   phase imaging which pools further on delayed imaging (304/219, 309/77)   compatible with active bleeding site likely within proximal sigmoid colon.  -bleeding resolved, rectal exam-brown stool in vault  Rec  s/p Colonoscopy   Impressions:  -External hemorrhoids.  -The rectum, sigmoid and distal descending colon were inadequately prepped,  but no blood was noted in the lumen. Pathologies could have been missed because  of inadequate prep. .    Rec  -May resume anticoagulation as needed.  -Repeat colonoscopy as outpt with aggressive prep.  -F/Up at [facility], Follow up with our [facility] located at [address] [place] [address]. Phone Number: [phone]   -Transfuse prn to hgb >8  - PPI ppx  -Monitor Vital Signs  -Monitor Stool For blood, frequency, consistency, melena  -Active Type and Screen      #more lytic destructive lesion of the left sacrum, consistent with   metastatic disease (301/214)..  #Interval development of multiple bilobar vascular metastases  Rec  - Care as per primary team     Recall GI if needed   
68yFemale pmh hypothyroidism DVT 2019 on eliquis, metastatic renal cell carcinoma stage 4 s/p L nephrectomy, distal pancreatectomy and splenectomy on chemotherapy presents to ER with BRBPR Saturday night and Sunday. GI consulted for rectal bleeding and CTA positive. Colonoscopy 2019 showed diverticulosis as per patient     #rectal bleeding CTA positive  rectal exam-brown stool in rectal vault  Within the left lower quadrant, , a blush of contrast seen on arterial   phase imaging which pools further on delayed imaging (304/219, 309/77)   compatible with active bleeding site likely within proximal sigmoid colon.  -bleeding resolved, rectal exam-brown stool in vault  Rec  patient on eliquis based off GFR hold three days plan for procedure Wednesday  -clear liquids today   -Golytely prep today  -npo aftermidnight   -Maintain Hemodynamic Stability   -Monitor CBC  -CMP,Optimize Electrolytes  -PT,PTT,INR  -EKG, Chest-Xray   -Transfuse prn to hgb >8  -Two large bore IV lines  - PPI ppx  -Monitor Vital Signs  -Monitor Stool For blood, frequency, consistency, melena  -Active Type and Screen      #more lytic destructive lesion of the left sacrum, consistent with   metastatic disease (301/214)..  #Interval development of multiple bilobar vascular metastases  Rec  - Care as per primary team

## 2024-02-29 NOTE — PROGRESS NOTE ADULT - TIME BILLING
Coordination of care
50 minutes spent on total encounter; more than 50% of the visit was spent counseling and / or coordinating care by the attending physician.  The necessity of the time spent during the encounter on this date of service was due to: Coordination of care.

## 2024-02-29 NOTE — DISCHARGE NOTE NURSING/CASE MANAGEMENT/SOCIAL WORK - NSDCPEFALRISK_GEN_ALL_CORE
For information on Fall & Injury Prevention, visit: [email or web address] OR  [email or web address] OR  https://www.cdc.gov/steadi/patient.html

## 2024-02-29 NOTE — DISCHARGE NOTE NURSING/CASE MANAGEMENT/SOCIAL WORK - PATIENT PORTAL LINK FT
You can access the FollowMyHealth Patient Portal offered by [facility] by registering at the following website: [email or web address]. By joining [employer]’s FollowMyHealth portal, you will also be able to view your health information using other applications (apps) compatible with our system.

## 2024-02-29 NOTE — PROGRESS NOTE ADULT - SUBJECTIVE AND OBJECTIVE BOX
Progress note    INTERVAL HPI/OVERNIGHT EVENTS:    Patient seen and examined at bedside. No acute distress. Had a BM this morning, no blood but reports blood on toilet paper. No lightheadedness or dizziness.      REVIEW OF SYSTEMS:  All other 13 Review of systems were reviewed and are negative    FAMILY HISTORY:    T(C): 36.8 (02-27-24 @ 04:02), Max: 36.8 (02-26-24 @ 20:00)  HR: 61 (02-27-24 @ 04:02) (61 - 70)  BP: 113/69 (02-27-24 @ 04:02) (113/69 - 136/80)  RR: 16 (02-27-24 @ 04:02) (16 - 18)  SpO2: 97% (02-27-24 @ 04:02) (97% - 97%)  Wt(kg): --Vital Signs Last 24 Hrs  T(C): 36.8 (27 Feb 2024 04:02), Max: 36.8 (26 Feb 2024 20:00)  T(F): 98.3 (27 Feb 2024 04:02), Max: 98.3 (26 Feb 2024 20:00)  HR: 61 (27 Feb 2024 04:02) (61 - 70)  BP: 113/69 (27 Feb 2024 04:02) (113/69 - 136/80)  BP(mean): --  RR: 16 (27 Feb 2024 04:02) (16 - 18)  SpO2: 97% (27 Feb 2024 04:02) (97% - 97%)    Parameters below as of 26 Feb 2024 20:00  Patient On (Oxygen Delivery Method): room air      penicillins (Angioedema)  fish (Unknown)      PHYSICAL EXAM:    Constitutional: NAD, well-nourished, non toxic appearing   HEENT: Airway patent, moist MM, no erythema/swelling/deformity of oral structures. EOMI, PERRLA.  CV: regular rate, regular rhythm, well-perfused extremities, 2+ b/l DP and radial pulses equal.  Lungs: BCTA, no increased WOB.  ABD: NTND, no guarding or rebound, no pulsatile mass, no hernias.   MSK: Neck supple, nontender, nl ROM, no stepoff. Chest nontender. Back nontender in TLS spine or to b/l bony structures or flanks. Ext nontender, nl rom, no deformity.   INTEG: bilateral maculopapular rash in medial aspect of both thighs, improved now leaving an erythematous base  NEURO: A&Ox3, normal strength, nl sensation throughout, normal speech.   PSYCH: Denies SI/HI/hallucinations    Consultant(s) Notes Reviewed:  [x ] YES  [ ] NO  Care Discussed with Consultants/Other Providers [ x] YES  [ ] NO    LABS:      RADIOLOGY & ADDITIONAL TESTS:    Imaging Personally Reviewed:  [ ] YES  [ ] NO  acetaminophen     Tablet .. 650 milliGRAM(s) Oral every 6 hours PRN  axitinib 5 milliGRAM(s) Oral every 12 hours  levothyroxine 25 MICROGram(s) Oral daily  ondansetron Injectable 4 milliGRAM(s) IV Push every 6 hours PRN  pantoprazole  Injectable 40 milliGRAM(s) IV Push daily  polyethylene glycol 3350 17 Gram(s) Oral two times a day  polyethylene glycol/electrolyte Solution. 4000 milliLiter(s) Oral once  senna 2 Tablet(s) Oral at bedtime PRN  sodium chloride 0.9%. 1000 milliLiter(s) IV Continuous <Continuous>      HEALTH ISSUES - PROBLEM Dx:    67 yo F PMH of hypothyroidism, DVT (on eliquis), metastatic renal cell carcinoma s/p L nephrectomy, distal pancreatectomy and splenectomy on chemotherapy presents to ER with BRBPR.     #BRBPR  - CT: active bleeding site likely within proximal sigmoid colon  - admit to medicine, monitor on tele   - IR consulted - No intervention at current time   - GI consult for C-scope Wednesday  - clears  - IVF, Golytly today  - PPI qd  - trend h/h, t&s, transfuse prn     # H/o renal cell CA   - s/p  L nephrectomy  - Per outpatient Hematologist/oncologist, patient should hold chemotherapy while off Eliquis    #B/l medial LE rash  -Recently started 1 day prior to admission  -D/w derm with photograph, recommended trial of vitamin A and D topical    # H/O DVT  - hold Eliquis     # H/O Hypothyroidism   - c/w synthroid     #dvt ppx - scds   # gi ppx   #clears  #full code     Handoff: here for lower GI bleed. Plan for c-scope tomorrow. Possible dc tomorrow after procedure or Thursday       Progress note    INTERVAL HPI/OVERNIGHT EVENTS:    Patient seen and examined at bedside. No acute distress. Had a BM this morning, no blood but reports blood on toilet paper. No lightheadedness or dizziness.      REVIEW OF SYSTEMS:  All other 13 Review of systems were reviewed and are negative    FAMILY HISTORY:    Vital Signs Last 24 Hrs  T(C): 36.3 (29 Feb 2024 14:00), Max: 36.4 (29 Feb 2024 05:00)  T(F): 97.4 (29 Feb 2024 14:00), Max: 97.6 (29 Feb 2024 05:00)  HR: 80 (29 Feb 2024 14:00) (67 - 80)  BP: 116/62 (29 Feb 2024 14:00) (116/62 - 126/78)  BP(mean): --  RR: 18 (29 Feb 2024 14:00) (18 - 18)  SpO2: 98% (29 Feb 2024 14:00) (98% - 98%)    Parameters below as of 29 Feb 2024 14:00  Patient On (Oxygen Delivery Method): room air      penicillins (Angioedema)  fish (Unknown)      PHYSICAL EXAM:    Constitutional: NAD, well-nourished, non toxic appearing   HEENT: Airway patent, moist MM, no erythema/swelling/deformity of oral structures. EOMI, PERRLA.  CV: regular rate, regular rhythm, well-perfused extremities, 2+ b/l DP and radial pulses equal.  Lungs: BCTA, no increased WOB.  ABD: NTND, no guarding or rebound, no pulsatile mass, no hernias.   MSK: Neck supple, nontender, nl ROM, no stepoff. Chest nontender. Back nontender in TLS spine or to b/l bony structures or flanks. Ext nontender, nl rom, no deformity.   INTEG: bilateral maculopapular rash in medial aspect of both thighs, improved now leaving an erythematous base  NEURO: A&Ox3, normal strength, nl sensation throughout, normal speech.   PSYCH: Denies SI/HI/hallucinations    Consultant(s) Notes Reviewed:  [x ] YES  [ ] NO  Care Discussed with Consultants/Other Providers [ x] YES  [ ] NO    LABS:                          11.7   5.69  )-----------( 409      ( 29 Feb 2024 07:51 )             34.9   02-28    138  |  101  |  10  ----------------------------<  63<L>  4.6   |  22  |  1.0    Ca    7.7<L>      28 Feb 2024 06:49          RADIOLOGY & ADDITIONAL TESTS:    Imaging Personally Reviewed:  [ ] YES  [ ] NO  acetaminophen     Tablet .. 650 milliGRAM(s) Oral every 6 hours PRN  axitinib 5 milliGRAM(s) Oral every 12 hours  levothyroxine 25 MICROGram(s) Oral daily  ondansetron Injectable 4 milliGRAM(s) IV Push every 6 hours PRN  pantoprazole  Injectable 40 milliGRAM(s) IV Push daily  polyethylene glycol 3350 17 Gram(s) Oral two times a day  polyethylene glycol/electrolyte Solution. 4000 milliLiter(s) Oral once  senna 2 Tablet(s) Oral at bedtime PRN  sodium chloride 0.9%. 1000 milliLiter(s) IV Continuous <Continuous>      HEALTH ISSUES - PROBLEM Dx:    69 yo F PMH of hypothyroidism, DVT (on eliquis), metastatic renal cell carcinoma s/p L nephrectomy, distal pancreatectomy and splenectomy on chemotherapy presents to ER with BRBPR. Patient evaluated by GI after having a positive CTA and underwent colonoscopy that showed resolution of bleeding and brown stool. Patient H/H have been stable and she is medically optimized for discharge with out-pt GI and PCP f/u.     #BRBPR  - CT: active bleeding site likely within proximal sigmoid colon  - IR consulted - No intervention at current time   - C-scope negative for active bleeding with brown stool noted; poor prep and can consider repeat outpt C-scope if indicated   - PPI qd  - trend h/h, t&s, transfuse prn     # H/o renal cell CA   - s/p  L nephrectomy  - Per outpatient Hematologist/oncologist, patient should hold chemotherapy while off Eliquis  - Eliquis restarted post C-scope and pt advised to restarted chemo    #B/l medial LE rash  -Recently started 1 day prior to admission  -D/w derm with photograph, recommended trial of vitamin A and D topical and PCP f/u    # H/O DVT  - c/w Eliquis     # H/O Hypothyroidism   - c/w synthroid     #dvt ppx - scds   # gi ppx   #clears  #full code

## 2024-02-29 NOTE — PROGRESS NOTE ADULT - SUBJECTIVE AND OBJECTIVE BOX
68yFemale  Being followed for CTA bleed  Interval history: Patient denies nausea, vomiting, hematemesis, melena, blood in stool, diarrhea, constipation, abdominal pain. Patient s/p Colonoscopy doing well. no further bleeding.       PAST MEDICAL & SURGICAL HISTORY:   Renal cell carcinoma      Hypertension      Breast cancer      H/O left nephrectomy      H/O partial adrenalectomy      History of partial pancreatectomy      S/P splenectomy      H/O lumpectomy                Social History: No smoking. No alcohol. No illegal drug use.          MEDICATIONS  (STANDING):  apixaban 2.5 milliGRAM(s) Oral every 12 hours  levothyroxine 25 MICROGram(s) Oral daily  pantoprazole  Injectable 40 milliGRAM(s) IV Push daily  polyethylene glycol 3350 17 Gram(s) Oral two times a day  vitamin A &amp; D Ointment 1 Application(s) Topical two times a day    MEDICATIONS  (PRN):  acetaminophen     Tablet .. 650 milliGRAM(s) Oral every 6 hours PRN Temp greater or equal to 38C (100.4F), Mild Pain (1 - 3)  senna 2 Tablet(s) Oral at bedtime PRN Constipation      Allergies:   penicillins (Angioedema)  fish (Unknown)  Intolerances          REVIEW OF SYSTEMS:  General:  No weight loss, fevers, or chills.  Eyes:  No reported pain or visual changes  ENT:  No sore throat or runny nose.  NECK: No stiffness   CV:  No chest pain or palpitations.  Resp:  No shortness of breath, cough  GI:  No abdominal pain, nausea, vomiting, dysphagia, diarrhea or constipation. No rectal bleeding, melena, or hematemesis.  Muscle:  No aches or weakness  Neuro:  No tingling, numbness       VITAL SIGNS:   T(F): 97.6 (24 @ 05:00), Max: 97.7 (24 @ 15:30)  HR: 67 (24 @ 05:00) (65 - 72)  BP: 126/78 (24 @ 05:00) (101/65 - 147/83)  RR: 18 (24 @ 05:00) (14 - 18)  SpO2: 98% (24 @ 05:00) (96% - 99%)    PHYSICAL EXAM:  GENERAL: AAOx3, no acute distress.  HEAD:  Atraumatic, Normocephalic  EYES: conjunctiva and sclera clear  NECK: Supple, no JVD or thyromegaly  CHEST/LUNG: Clear to auscultation bilaterally; No wheeze, rhonchi, or rales  HEART: Regular rate and rhythm; normal S1, S2, No murmurs.  ABDOMEN: Soft, nontender, nondistended; Bowel sounds present  NEUROLOGY: No asterixis or tremor.   SKIN: Intact, no jaundice            LABS:                        11.7   5.69  )-----------( 409      ( 2024 07:51 )             34.9         138  |  101  |  10  ----------------------------<  63<L>  4.6   |  22  |  1.0    Ca    7.7<L>      2024 06:49        PT/INR - ( 2024 06:49 )   PT: 15.00 sec;   INR: 1.31 ratio         PTT - ( 2024 06:49 )  PTT:39.2 sec    IMAGING:    < from: CT Angio Abdomen and Pelvis w/ IV Cont (24 @ 19:30) >    ACC: [id number] EXAM:  CT ANGIO ABD PELV (W)AW IC   ORDERED BY: BHAVYA FARFAN     PROCEDURE DATE:  2024          INTERPRETATION:  CLINICAL STATEMENT: Rectal bleeding. History of renal   carcinoma    TECHNIQUE: Multislice helical sections wereobtained from the lower chest   to the pubic symphysis before intravenous contrast administration. CTA   images were subsequently obtained from the lower chest to the pubic   symphysis. 95 cc administered of Omnipaque 350 (5 cc discarded). Sagittal   and coronal reformatted images were acquired, as well as MIP   reconstructed images.    COMPARISON CT: Abdomen and pelvis 2022    OTHER STUDIES USED FOR CORRELATION: None.      FINDINGS:        LOWER CHEST: Trace pericardial fluid trace bilateralpleural effusion. No   suspicious basilar nodules.    HEPATOBILIARY: Interval development of multiple bilobar vascular   metastases.    SPLEEN: Post splenectomy.    PANCREAS: Unremarkable.    ADRENAL GLANDS: Post left adrenalectomy. Stable right adrenal gland.    KIDNEYS: Post left nephrectomy. No residual disease within the left   nephrectomy bed is noted. Prominent venous collaterals within the   surgical bed is noted.  Distended proximal right collecting system. No suspicious right renal   lesions. Multiple surgical clips within the adjacent retroperitoneum is   noted..    ABDOMINAL NODES: Unremarkable.    PERITONEUM/MESENTERY/BOWEL: Large amount of retained fecal material with   a diffusely distended colon is noted, consistent with fecal impaction..   No ascites or pneumoperitoneum    Within the left lower quadrant, inferior to multiple surgical clips and   adjacent to the left psoas muscle, a blush of contrast seen on arterial   phase imaging which pools further on delayed imaging (304/219, 309/77)   compatible with active bleeding site likely within proximal sigmoid colon.    BONES/SOFT TISSUES: A more lytic destructive lesion of the left sacrum,   consistent with metastatic disease (301/214)..    OTHER: Normal caliber patent abdominal aorta.      IMPRESSION:    Within the left lower quadrant, , a blush of contrast seen on arterial   phase imaging which pools further on delayed imaging (304/219, 309/77)   compatible with active bleeding site likely within proximal sigmoid colon.    more lytic destructive lesion of the left sacrum, consistent with   metastatic disease (301/214)..    Interval development of multiple bilobar vascular metastases    --- End of Report ---            KELBY BURNS MD; Attending Radiologist  Thisdocument has been electronically signed. 2024  8:34PM    < end of copied text >      < from: Colonoscopy (24 @ 14:30) >  Colonoscopy Report    Date: 2024 2:30 PM    Patient Name: PARUL STOO    MRN: [id number]    Account Number:    [id number]    Gender: Female     (age): 1955 (68)    Instrument(s):    PCF-H190 ([id number])([id number])    Attending/Fellow:    Jes Marrero MD        Referring Physician:    JONAS LERMA    [address] [place] [id number]    [phone] (phone)    [phone] (fax)        Nurse:    Porsche Berg RN    Indications:    GI Bleed: 578.9 - K92.2    Procedure:    This is a average risk patient    The procedure, indications, preparation and potential complications were  explained to the patient, who indicated understanding and signed the  corresponding consent forms. MAC was administered by the anesthesiologist.  Continuous pulse oximetry and blood pressure monitoring were used throughout the  procedure. Supplemental oxygen was used. The quality of preparation was  inadequate - BBP score N/A. Patient was placed in left lateral decubitus  position. Digital exam was normal. The colonoscope was introduced through the  rectum and advanced under direct visualization until distal descending colon was  reached. Patient tolerance to procedure was excellent. The procedure was not  difficult.    Throckmorton Bowel Preparation Score:    Using the Throckmorton Bowel Preparation Score, each segment of the colon was graded  as follows:    Left Colon: Poor, 1 | Transverse Colon: Inadequate, 0  | Right Colon:  Inadequate, 0    Limitations/Complications:    There were no apparent limitations or complications    Findings:    Protruding lesions Medium external hemorrhoids were noted.    Additional findings -The rectum, sigmoid and distal descending colon were  inadequately prepped, but no blood was noted in the lumen. Pathologies could  have been missed because of inadequate prep..    Impressions:    External hemorrhoids.    -The rectum, sigmoid and distal descending colon were inadequately prepped,  but no blood was noted in the lumen. Pathologies could have been missed because  of inadequate prep. .    Plan:    May resume anticoagulation as needed.    Repeat colonoscopy as outpt with aggressive prep.    F/Up at [facility]    Jes Marrero MD    Version 1, Electronically signed on 2024 3:58:24 PM by Jes Marrero MD    < end of copied text >

## 2024-03-04 PROBLEM — I10 ESSENTIAL (PRIMARY) HYPERTENSION: Chronic | Status: ACTIVE | Noted: 2024-02-25

## 2024-03-04 PROBLEM — C50.919 MALIGNANT NEOPLASM OF UNSPECIFIED SITE OF UNSPECIFIED FEMALE BREAST: Chronic | Status: ACTIVE | Noted: 2024-02-25

## 2024-03-05 ENCOUNTER — APPOINTMENT (OUTPATIENT)
Dept: GASTROENTEROLOGY | Facility: CLINIC | Age: 69
End: 2024-03-05

## 2024-03-08 DIAGNOSIS — Z85.528 PERSONAL HISTORY OF OTHER MALIGNANT NEOPLASM OF KIDNEY: ICD-10-CM

## 2024-03-08 DIAGNOSIS — Z90.5 ACQUIRED ABSENCE OF KIDNEY: ICD-10-CM

## 2024-03-08 DIAGNOSIS — Z90.411 ACQUIRED PARTIAL ABSENCE OF PANCREAS: ICD-10-CM

## 2024-03-08 DIAGNOSIS — C79.51 SECONDARY MALIGNANT NEOPLASM OF BONE: ICD-10-CM

## 2024-03-08 DIAGNOSIS — Z90.49 ACQUIRED ABSENCE OF OTHER SPECIFIED PARTS OF DIGESTIVE TRACT: ICD-10-CM

## 2024-03-08 DIAGNOSIS — Z87.891 PERSONAL HISTORY OF NICOTINE DEPENDENCE: ICD-10-CM

## 2024-03-08 DIAGNOSIS — Z79.01 LONG TERM (CURRENT) USE OF ANTICOAGULANTS: ICD-10-CM

## 2024-03-08 DIAGNOSIS — K62.5 HEMORRHAGE OF ANUS AND RECTUM: ICD-10-CM

## 2024-03-08 DIAGNOSIS — E03.9 HYPOTHYROIDISM, UNSPECIFIED: ICD-10-CM

## 2024-03-08 DIAGNOSIS — Z91.013 ALLERGY TO SEAFOOD: ICD-10-CM

## 2024-03-08 DIAGNOSIS — Z86.718 PERSONAL HISTORY OF OTHER VENOUS THROMBOSIS AND EMBOLISM: ICD-10-CM

## 2024-03-08 DIAGNOSIS — Z79.890 HORMONE REPLACEMENT THERAPY: ICD-10-CM

## 2024-03-08 DIAGNOSIS — C79.89 SECONDARY MALIGNANT NEOPLASM OF OTHER SPECIFIED SITES: ICD-10-CM

## 2024-03-08 DIAGNOSIS — Z88.0 ALLERGY STATUS TO PENICILLIN: ICD-10-CM

## 2024-03-08 DIAGNOSIS — R21 RASH AND OTHER NONSPECIFIC SKIN ERUPTION: ICD-10-CM

## 2024-03-12 ENCOUNTER — INPATIENT (INPATIENT)
Facility: HOSPITAL | Age: 69
LOS: 3 days | Discharge: HOME HEALTH | DRG: 607 | End: 2024-03-16
Attending: INTERNAL MEDICINE | Admitting: INTERNAL MEDICINE
Payer: MEDICARE

## 2024-03-12 VITALS
HEART RATE: 74 BPM | DIASTOLIC BLOOD PRESSURE: 76 MMHG | OXYGEN SATURATION: 98 % | SYSTOLIC BLOOD PRESSURE: 144 MMHG | WEIGHT: 100.97 LBS | RESPIRATION RATE: 17 BRPM | TEMPERATURE: 97 F

## 2024-03-12 DIAGNOSIS — R21 RASH AND OTHER NONSPECIFIC SKIN ERUPTION: ICD-10-CM

## 2024-03-12 DIAGNOSIS — D64.9 ANEMIA, UNSPECIFIED: ICD-10-CM

## 2024-03-12 DIAGNOSIS — L03.116 CELLULITIS OF LEFT LOWER LIMB: ICD-10-CM

## 2024-03-12 DIAGNOSIS — Z98.890 OTHER SPECIFIED POSTPROCEDURAL STATES: Chronic | ICD-10-CM

## 2024-03-12 DIAGNOSIS — Z88.0 ALLERGY STATUS TO PENICILLIN: ICD-10-CM

## 2024-03-12 DIAGNOSIS — Z86.718 PERSONAL HISTORY OF OTHER VENOUS THROMBOSIS AND EMBOLISM: ICD-10-CM

## 2024-03-12 DIAGNOSIS — L03.115 CELLULITIS OF RIGHT LOWER LIMB: ICD-10-CM

## 2024-03-12 DIAGNOSIS — I10 ESSENTIAL (PRIMARY) HYPERTENSION: ICD-10-CM

## 2024-03-12 DIAGNOSIS — Z90.5 ACQUIRED ABSENCE OF KIDNEY: Chronic | ICD-10-CM

## 2024-03-12 DIAGNOSIS — Z90.81 ACQUIRED ABSENCE OF SPLEEN: Chronic | ICD-10-CM

## 2024-03-12 DIAGNOSIS — Z85.528 PERSONAL HISTORY OF OTHER MALIGNANT NEOPLASM OF KIDNEY: ICD-10-CM

## 2024-03-12 DIAGNOSIS — Z79.01 LONG TERM (CURRENT) USE OF ANTICOAGULANTS: ICD-10-CM

## 2024-03-12 DIAGNOSIS — E89.6 POSTPROCEDURAL ADRENOCORTICAL (-MEDULLARY) HYPOFUNCTION: Chronic | ICD-10-CM

## 2024-03-12 DIAGNOSIS — R26.89 OTHER ABNORMALITIES OF GAIT AND MOBILITY: ICD-10-CM

## 2024-03-12 DIAGNOSIS — E03.9 HYPOTHYROIDISM, UNSPECIFIED: ICD-10-CM

## 2024-03-12 DIAGNOSIS — Z90.5 ACQUIRED ABSENCE OF KIDNEY: ICD-10-CM

## 2024-03-12 DIAGNOSIS — C78.89 SECONDARY MALIGNANT NEOPLASM OF OTHER DIGESTIVE ORGANS: ICD-10-CM

## 2024-03-12 DIAGNOSIS — Z90.411 ACQUIRED PARTIAL ABSENCE OF PANCREAS: Chronic | ICD-10-CM

## 2024-03-12 LAB
ALBUMIN SERPL ELPH-MCNC: 3.2 G/DL — LOW (ref 3.5–5.2)
ALP SERPL-CCNC: 91 U/L — SIGNIFICANT CHANGE UP (ref 30–115)
ALT FLD-CCNC: 13 U/L — SIGNIFICANT CHANGE UP (ref 0–41)
ANION GAP SERPL CALC-SCNC: 12 MMOL/L — SIGNIFICANT CHANGE UP (ref 7–14)
AST SERPL-CCNC: 25 U/L — SIGNIFICANT CHANGE UP (ref 0–41)
BASOPHILS # BLD AUTO: 0.03 K/UL — SIGNIFICANT CHANGE UP (ref 0–0.2)
BASOPHILS NFR BLD AUTO: 0.5 % — SIGNIFICANT CHANGE UP (ref 0–1)
BILIRUB SERPL-MCNC: 0.3 MG/DL — SIGNIFICANT CHANGE UP (ref 0.2–1.2)
BUN SERPL-MCNC: 18 MG/DL — SIGNIFICANT CHANGE UP (ref 10–20)
CALCIUM SERPL-MCNC: 8.8 MG/DL — SIGNIFICANT CHANGE UP (ref 8.4–10.4)
CHLORIDE SERPL-SCNC: 101 MMOL/L — SIGNIFICANT CHANGE UP (ref 98–110)
CO2 SERPL-SCNC: 22 MMOL/L — SIGNIFICANT CHANGE UP (ref 17–32)
CREAT SERPL-MCNC: 0.9 MG/DL — SIGNIFICANT CHANGE UP (ref 0.7–1.5)
EGFR: 70 ML/MIN/1.73M2 — SIGNIFICANT CHANGE UP
EOSINOPHIL # BLD AUTO: 0 K/UL — SIGNIFICANT CHANGE UP (ref 0–0.7)
EOSINOPHIL NFR BLD AUTO: 0 % — SIGNIFICANT CHANGE UP (ref 0–8)
GLUCOSE SERPL-MCNC: 70 MG/DL — SIGNIFICANT CHANGE UP (ref 70–99)
HCT VFR BLD CALC: 33.4 % — LOW (ref 37–47)
HGB BLD-MCNC: 11 G/DL — LOW (ref 12–16)
IMM GRANULOCYTES NFR BLD AUTO: 0.5 % — HIGH (ref 0.1–0.3)
LYMPHOCYTES # BLD AUTO: 0.98 K/UL — LOW (ref 1.2–3.4)
LYMPHOCYTES # BLD AUTO: 15.3 % — LOW (ref 20.5–51.1)
MCHC RBC-ENTMCNC: 32.3 PG — HIGH (ref 27–31)
MCHC RBC-ENTMCNC: 32.9 G/DL — SIGNIFICANT CHANGE UP (ref 32–37)
MCV RBC AUTO: 97.9 FL — SIGNIFICANT CHANGE UP (ref 81–99)
MONOCYTES # BLD AUTO: 0.91 K/UL — HIGH (ref 0.1–0.6)
MONOCYTES NFR BLD AUTO: 14.2 % — HIGH (ref 1.7–9.3)
NEUTROPHILS # BLD AUTO: 4.47 K/UL — SIGNIFICANT CHANGE UP (ref 1.4–6.5)
NEUTROPHILS NFR BLD AUTO: 69.5 % — SIGNIFICANT CHANGE UP (ref 42.2–75.2)
NRBC # BLD: 0 /100 WBCS — SIGNIFICANT CHANGE UP (ref 0–0)
PLATELET # BLD AUTO: 245 K/UL — SIGNIFICANT CHANGE UP (ref 130–400)
PMV BLD: 10.3 FL — SIGNIFICANT CHANGE UP (ref 7.4–10.4)
POTASSIUM SERPL-MCNC: 4.1 MMOL/L — SIGNIFICANT CHANGE UP (ref 3.5–5)
POTASSIUM SERPL-SCNC: 4.1 MMOL/L — SIGNIFICANT CHANGE UP (ref 3.5–5)
PROT SERPL-MCNC: 7.1 G/DL — SIGNIFICANT CHANGE UP (ref 6–8)
RBC # BLD: 3.41 M/UL — LOW (ref 4.2–5.4)
RBC # FLD: 17.5 % — HIGH (ref 11.5–14.5)
SODIUM SERPL-SCNC: 135 MMOL/L — SIGNIFICANT CHANGE UP (ref 135–146)
WBC # BLD: 6.42 K/UL — SIGNIFICANT CHANGE UP (ref 4.8–10.8)
WBC # FLD AUTO: 6.42 K/UL — SIGNIFICANT CHANGE UP (ref 4.8–10.8)

## 2024-03-12 PROCEDURE — 86704 HEP B CORE ANTIBODY TOTAL: CPT

## 2024-03-12 PROCEDURE — 86160 COMPLEMENT ANTIGEN: CPT

## 2024-03-12 PROCEDURE — 88350 IMFLUOR EA ADDL 1ANTB STN PX: CPT

## 2024-03-12 PROCEDURE — 99285 EMERGENCY DEPT VISIT HI MDM: CPT | Mod: GC

## 2024-03-12 PROCEDURE — 86225 DNA ANTIBODY NATIVE: CPT

## 2024-03-12 PROCEDURE — 83735 ASSAY OF MAGNESIUM: CPT

## 2024-03-12 PROCEDURE — 86038 ANTINUCLEAR ANTIBODIES: CPT

## 2024-03-12 PROCEDURE — 87389 HIV-1 AG W/HIV-1&-2 AB AG IA: CPT

## 2024-03-12 PROCEDURE — 99223 1ST HOSP IP/OBS HIGH 75: CPT

## 2024-03-12 PROCEDURE — 86705 HEP B CORE ANTIBODY IGM: CPT

## 2024-03-12 PROCEDURE — 97116 GAIT TRAINING THERAPY: CPT | Mod: GP

## 2024-03-12 PROCEDURE — 82595 ASSAY OF CRYOGLOBULIN: CPT

## 2024-03-12 PROCEDURE — 86706 HEP B SURFACE ANTIBODY: CPT

## 2024-03-12 PROCEDURE — 88346 IMFLUOR 1ST 1ANTB STAIN PX: CPT

## 2024-03-12 PROCEDURE — 97530 THERAPEUTIC ACTIVITIES: CPT | Mod: GP

## 2024-03-12 PROCEDURE — 87340 HEPATITIS B SURFACE AG IA: CPT

## 2024-03-12 PROCEDURE — 97162 PT EVAL MOD COMPLEX 30 MIN: CPT | Mod: GP

## 2024-03-12 PROCEDURE — 85025 COMPLETE CBC W/AUTO DIFF WBC: CPT

## 2024-03-12 PROCEDURE — 80053 COMPREHEN METABOLIC PANEL: CPT

## 2024-03-12 PROCEDURE — 86431 RHEUMATOID FACTOR QUANT: CPT

## 2024-03-12 PROCEDURE — 36415 COLL VENOUS BLD VENIPUNCTURE: CPT

## 2024-03-12 PROCEDURE — 86235 NUCLEAR ANTIGEN ANTIBODY: CPT

## 2024-03-12 PROCEDURE — 88305 TISSUE EXAM BY PATHOLOGIST: CPT

## 2024-03-12 PROCEDURE — 86803 HEPATITIS C AB TEST: CPT

## 2024-03-12 PROCEDURE — 99221 1ST HOSP IP/OBS SF/LOW 40: CPT | Mod: FS

## 2024-03-12 PROCEDURE — 86036 ANCA SCREEN EACH ANTIBODY: CPT

## 2024-03-12 PROCEDURE — 87070 CULTURE OTHR SPECIMN AEROBIC: CPT

## 2024-03-12 RX ORDER — APIXABAN 2.5 MG/1
2.5 TABLET, FILM COATED ORAL EVERY 12 HOURS
Refills: 0 | Status: DISCONTINUED | OUTPATIENT
Start: 2024-03-12 | End: 2024-03-16

## 2024-03-12 RX ORDER — AMLODIPINE BESYLATE 2.5 MG/1
5 TABLET ORAL DAILY
Refills: 0 | Status: DISCONTINUED | OUTPATIENT
Start: 2024-03-12 | End: 2024-03-16

## 2024-03-12 RX ORDER — LANOLIN ALCOHOL/MO/W.PET/CERES
3 CREAM (GRAM) TOPICAL AT BEDTIME
Refills: 0 | Status: DISCONTINUED | OUTPATIENT
Start: 2024-03-12 | End: 2024-03-16

## 2024-03-12 RX ORDER — ACETAMINOPHEN 500 MG
650 TABLET ORAL EVERY 6 HOURS
Refills: 0 | Status: DISCONTINUED | OUTPATIENT
Start: 2024-03-12 | End: 2024-03-15

## 2024-03-12 RX ORDER — LENVATINIB 4 MG/1
0 CAPSULE ORAL
Qty: 0 | Refills: 0 | DISCHARGE

## 2024-03-12 RX ORDER — ONDANSETRON 8 MG/1
4 TABLET, FILM COATED ORAL EVERY 8 HOURS
Refills: 0 | Status: DISCONTINUED | OUTPATIENT
Start: 2024-03-12 | End: 2024-03-16

## 2024-03-12 RX ORDER — EVEROLIMUS 10 MG/1
0 TABLET ORAL
Qty: 0 | Refills: 0 | DISCHARGE

## 2024-03-12 RX ORDER — ESCITALOPRAM OXALATE 10 MG/1
0 TABLET, FILM COATED ORAL
Qty: 0 | Refills: 0 | DISCHARGE

## 2024-03-12 RX ORDER — LEVOTHYROXINE SODIUM 125 MCG
25 TABLET ORAL DAILY
Refills: 0 | Status: DISCONTINUED | OUTPATIENT
Start: 2024-03-12 | End: 2024-03-16

## 2024-03-12 RX ORDER — PANTOPRAZOLE SODIUM 20 MG/1
40 TABLET, DELAYED RELEASE ORAL
Refills: 0 | Status: DISCONTINUED | OUTPATIENT
Start: 2024-03-12 | End: 2024-03-16

## 2024-03-12 RX ADMIN — Medication 650 MILLIGRAM(S): at 23:04

## 2024-03-12 RX ADMIN — Medication 650 MILLIGRAM(S): at 23:30

## 2024-03-12 RX ADMIN — Medication 100 MILLIGRAM(S): at 23:02

## 2024-03-12 NOTE — H&P ADULT - ATTENDING COMMENTS
68Y/F with PMHx of Hypothyroidism, DVT (on eliquis), metastatic renal cell carcinoma s/p L nephrectomy, distal pancreatectomy and splenectomy on chemotherapy Inlyta (follows with Dr. Voss at [facility]) presented to the ED  for evaluation of bilateral lower extremity blistering and rash that started >2 weeks ago and has blistered for last 3 days, now a/w pain anterior legs.    Agree  with assessment  except for changes below.   Vital Signs (24 Hrs):  T(C): 36.3 (03-12-24 @ 12:28), Max: 36.3 (03-12-24 @ 12:28)  HR: 74 (03-12-24 @ 12:28) (74 - 74)  BP: 144/76 (03-12-24 @ 12:28) (144/76 - 144/76)  RR: 17 (03-12-24 @ 12:28) (17 - 17)  SpO2: 98% (03-12-24 @ 12:28) (98% - 98%)    IMPRESSION  Petechial Rash :   Ddx: Infectious Vs Vasculitis Vs Stasis Dermatitis Vs Idiopathic  -Local wound care  -Dermatology consult  -Hep B, Hep C serology  -f/U PRIMO, anti-ds DNA, anti-Johnson Ab, anti-Josefina, anto-La Ab  -F/U C3, C4, Cryoglobulin, ANCA  -F/U RF, ESR , CRP    Normocytic Anemia- f/u Iron Studies      Hx  renal cell CA   - s/p  L nephrectomy  -continue with Lenvima and Everolimus    Hx  DVT - c/w Eliquis     Hx  Hypothyroidism  - c/w synthroid 68Y/F with PMHx of Hypothyroidism, DVT (on eliquis), metastatic renal cell carcinoma s/p L nephrectomy, distal pancreatectomy and splenectomy on chemotherapy Inlyta (follows with Dr. Voss at [facility]) presented to the ED  for evaluation of bilateral lower extremity blistering and rash that started >2 weeks ago and has blistered for last 3 days, now a/w pain anterior legs.    Agree  with assessment  except for changes below.   Vital Signs (24 Hrs):  T(C): 36.3 (03-12-24 @ 12:28), Max: 36.3 (03-12-24 @ 12:28)  HR: 74 (03-12-24 @ 12:28) (74 - 74)  BP: 144/76 (03-12-24 @ 12:28) (144/76 - 144/76)  RR: 17 (03-12-24 @ 12:28) (17 - 17)  SpO2: 98% (03-12-24 @ 12:28) (98% - 98%)    PHYSICAL EXAM  GENERAL: NAD,  HEAD:  NCAT, EOMI, MM  NECK: Supple, Nontender  NERVOUS SYSTEM:  AAOx3, NFD  CHEST/LUNG: +bs b/l, No wheezing   HEART: +s1s2 RRR  ABDOMEN: soft, NT/ND  EXTREMITIES:  pp, no edema  SKIN: Rash to bilateral lower legs with small areas of partial thickness wounds. + Serous drainage   Signs of Ruptured Blisters     IMPRESSION  Rash to bilateral lower legs with small areas of partial thickness wounds. + Serous drainage   Signs of Ruptured Blisters   2 Weeks Duration   Ddx: Infectious Vs Vasculitis Vs Stasis Dermatitis Vs Idiopathic  -Local wound care  -Dermatology consult  -Hep B, Hep C serology  -f/U PRIMO, anti-ds DNA, anti-Johnson Ab, anti-Josefina, anto-La Ab  -F/U C3, C4, Cryoglobulin, ANCA  -F/U RF, ESR , CRP    Normocytic Anemia- f/u Iron Studies      Hx  renal cell CA   - s/p  L nephrectomy  -continue with Lenvima and Everolimus    Hx  DVT - c/w Eliquis     Hx  Hypothyroidism  - c/w synthroid    Seen On 03/12

## 2024-03-12 NOTE — H&P ADULT - ASSESSMENT
A 68Y/F with PMHx of Hypothyroidism, DVT (on eliquis), metastatic renal cell carcinoma s/p L nephrectomy, distal pancreatectomy and splenectomy on chemotherapy Inlyta (follows with Dr. Voss at [facility]) presented to the ED  for evaluation of bilateral lower extremity blistering and rash that started >2 weeks ago and has blistered for last 3 days, now a/w pain anterior legs.      #Petechial Rash : Ddx: Infectious Vs Vasculitis Vs Stasis Dermatitis Vs Idiopathic  -Local wound care  -Dermatology consult  -Hep B, Hep C serology  -f/U PRIMO, anti-ds DNA, anti-Johnson Ab, anti-Josefina, anto-La Ab  -F/U C3, C4, Cryoglobulin, ANCA  -F/U RF, ESR , CRP    #H/o renal cell CA   - s/p  L nephrectomy  -continue with Lenvima and Everolimus    # H/O DVT  - c/w Eliquis     # H/O Hypothyroidism   - c/w synthroid     #MISC:  -DVT ppx: Heparin sq  -GI ppx: PPI  -Diet: DASH  -Activity: IAT  -Code status: Full code  -Dispo: Admit to medicine, Follow up Derm, F/U Burn  #full code                A 68Y/F with PMHx of Hypothyroidism, DVT (on eliquis), metastatic renal cell carcinoma s/p L nephrectomy, distal pancreatectomy and splenectomy on chemotherapy Inlyta (follows with Dr. Voss at [facility]) presented to the ED  for evaluation of bilateral lower extremity blistering and rash. Pt states rash started >2 weeks ago on bilateral thighs and anterior lower legs, was recently admitted to [facility] for GIB/blood in stool. At the time was treated with vitamin A&D ointment and advised to follow up with outpatient dermatology. Since discharge, rash on the thighs has improved , however leg areas developed small serous filled blisters 3 days ago.      #Petechial Rash : Ddx: Infectious Vs Vasculitis Vs Stasis Dermatitis Vs Idiopathic  -Local wound care  -Dermatology consult  -Hep B, Hep C serology  -f/U PRIMO, anti-ds DNA, anti-Johnson Ab, anti-Josefina, anto-La Ab  -F/U C3, C4, Cryoglobulin, ANCA  -F/U RF, ESR , CRP  -will treat with Empiric Abs(Clinda as the patient is allergic to penicillin) due to open wound on right posterior leg    #H/o renal cell CA   - s/p  L nephrectomy  -continue with Inlyta 5mg 2tab OD    # H/O DVT  - c/w Eliquis     # H/O Hypothyroidism   - c/w synthroid     #MISC:  -DVT ppx: Eliquis  -GI ppx: PPI  -Diet: DASH  -Activity: IAT  -Code status: Full code  -Dispo: Admit to medicine, Follow up Derm, F/U Burn  #full code                A 68Y/F with PMHx of Hypothyroidism, DVT (on eliquis), metastatic renal cell carcinoma s/p L nephrectomy, distal pancreatectomy and splenectomy on chemotherapy Inlyta (follows with Dr. Voss at [facility]) presented to the ED  for evaluation of bilateral lower extremity blistering and rash. Pt states rash started >2 weeks ago on bilateral thighs and anterior lower legs, was recently admitted to [facility] for GIB/blood in stool. At the time was treated with vitamin A&D ointment and advised to follow up with outpatient dermatology. Since discharge, rash on the thighs has improved , however leg areas developed small serous filled blisters 3 days ago.      #Petechial Rash : Ddx: Infectious Vs Vasculitis Vs Stasis Dermatitis Vs Idiopathic  -Local wound care  -Dermatology consult  -Hep B, Hep C serology  -f/U PRIMO, anti-ds DNA, anti-Johnson Ab, anti-Josefina, anto-La Ab  -F/U C3, C4, Cryoglobulin, ANCA  -F/U RF, ESR , CRP  -will treat with Empiric Abs(Clinda as the patient is allergic to penicillin) due to open wound on right posterior leg    #H/o renal cell CA   - s/p  L nephrectomy  -continue with Inlyta 5mg BID    # H/O DVT  - c/w Eliquis     # H/O Hypothyroidism   - c/w synthroid     #MISC:  -DVT ppx: Eliquis  -GI ppx: PPI  -Diet: DASH  -Activity: IAT  -Code status: Full code  -Dispo: Admit to medicine, Follow up Derm, F/U Burn  #full code

## 2024-03-12 NOTE — H&P ADULT - NSHPPHYSICALEXAM_GEN_ALL_CORE
CONSTITUTIONAL: Well groomed, no apparent distress  EYES: PERRLA and symmetric, EOMI, No conjunctival or scleral injection, non-icteric  ENMT: Oral mucosa with moist membranes.   RESP: No respiratory distress, no use of accessory muscles; CTA b/l, no WRR  CV: RRR, +S1S2, no MRG; no JVD; no peripheral edema  GI: Soft, NT, ND, no rebound, no guarding  LYMPH: No cervical LAD or tenderness  MSK: No digital clubbing or cyanosis  SKIN: petechial rash to bilateral lower legs with small areas of partial thickness wounds  NEURO: CN II-XII intact; normal reflexes in upper and lower extremities, sensation intact in upper and lower extremities b/l to light touch   PSYCH: Appropriate insight/judgment; A+O x 3, mood and affect appropriate, recent/remote memory intact CONSTITUTIONAL: Well groomed, no apparent distress  EYES: PERRLA and symmetric, EOMI, No conjunctival or scleral injection, non-icteric  ENMT: Oral mucosa with moist membranes.   RESP: No respiratory distress, no use of accessory muscles; CTA b/l, no WRR  CV: RRR, +S1S2, no MRG; no JVD; no peripheral edema  GI: Soft, NT, ND, no rebound, no guarding  LYMPH: No cervical LAD or tenderness  MSK: No digital clubbing or cyanosis  SKIN: petechial rash to bilateral lower legs with small areas of partial thickness wounds, open wound present over right posterior leg  NEURO: CN II-XII intact; normal reflexes in upper and lower extremities, sensation intact in upper and lower extremities b/l to light touch   PSYCH: Appropriate insight/judgment; A+O x 3, mood and affect appropriate, recent/remote memory intact

## 2024-03-12 NOTE — CONSULT NOTE ADULT - SUBJECTIVE AND OBJECTIVE BOX
Patient is a 68y old  Female who presents with a chief complaint of rash to bilateral lower extremities. Pt states rash started >2 weeks ago, was recently admitted to [facility] for GIB. At the time was treated with vitamin A&D ointment and advised to follow up with outpatient dermatology. Since discharge, some of the rash has improved while other areas developed small serous filled blisters. Pt denies recent changes in medications, fever, chills.    IVital Signs Last 24 Hrs  T(C): 36.3 (12 Mar 2024 12:28), Max: 36.3 (12 Mar 2024 12:28)  T(F): 97.4 (12 Mar 2024 12:28), Max: 97.4 (12 Mar 2024 12:28)  HR: 74 (12 Mar 2024 12:28) (74 - 74)  BP: 144/76 (12 Mar 2024 12:28) (144/76 - 144/76)  RR: 17 (12 Mar 2024 12:28) (17 - 17)  SpO2: 98% (12 Mar 2024 12:28) (98% - 98%)    O2 Parameters below as of 12 Mar 2024 12:28  Patient On (Oxygen Delivery Method): room air    PHYSICAL EXAM:  General: Pt sitting in stretcher, no acute distress  Skin: petechial rash to bilateral lower legs with small areas of partial thickness wounds. + Serous drainage TBSA <1%    + Dressing applied

## 2024-03-12 NOTE — H&P ADULT - NSICDXPASTMEDICALHX_GEN_ALL_CORE_FT
PAST MEDICAL HISTORY:  Breast cancer     Deep vein thrombosis (DVT)     Hypertension     Renal cell carcinoma

## 2024-03-12 NOTE — PATIENT PROFILE ADULT - FALL HARM RISK - HARM RISK INTERVENTIONS

## 2024-03-12 NOTE — H&P ADULT - HISTORY OF PRESENT ILLNESS
A 68Y/F with PMHx of Hypothyroidism, DVT (on eliquis), metastatic renal cell carcinoma s/p L nephrectomy, distal pancreatectomy and splenectomy on chemotherapy Inlyta (follows with Dr. Voss at [facility]) presented to the ED  for evaluation of bilateral lower extremity blistering and rash. Pt states rash started >2 weeks ago on bilateral thighs and anterior lower legs, was recently admitted to [facility] for GIB/blood in stool. At the time was treated with vitamin A&D ointment and advised to follow up with outpatient dermatology. Since discharge, some of the rash has improved while other areas developed small serous filled blisters 3 days ago. Also endorses pain on anterior shins. Denied recent changes in medications, fever, chills, Chest pain, SOB, N/V/D, abdominal pain, dysuria, recent travel, trauma, sick contacts, dizziness or LOC.     #ED Vitals:  T(C): 36.3 (03-12-24 @ 12:28), Max: 36.3 (03-12-24 @ 12:28)  HR: 74 (03-12-24 @ 12:28) (74 - 74)  BP: 144/76 (03-12-24 @ 12:28) (144/76 - 144/76)  RR: 17 (03-12-24 @ 12:28) (17 - 17)  SpO2: 98% (03-12-24 @ 12:28) (98% - 98%)    #Labs: Hb 11(at baseline)    The patient was seen by Burn team in ED.  The patient is being admitted to medicine for the further management.      A 68Y/F with PMHx of Hypothyroidism, DVT (on eliquis), metastatic renal cell carcinoma s/p L nephrectomy, distal pancreatectomy and splenectomy on chemotherapy Inlyta (follows with Dr. Vsos at [facility]) presented to the ED  for evaluation of bilateral lower extremity blistering and rash. Pt states rash started >2 weeks ago on bilateral thighs and anterior lower legs, was recently admitted to [facility] for GIB/blood in stool. At the time was treated with vitamin A&D ointment and advised to follow up with outpatient dermatology. Since discharge, rash on the thighs has improved , however leg areas developed small serous filled blisters 3 days ago. Also endorses pain on anterior shins. Denied recent changes in medications, fever, chills, Chest pain, SOB, N/V/D, abdominal pain, dysuria, recent travel, trauma, sick contacts, dizziness or LOC.     #ED Vitals:  T(C): 36.3 (03-12-24 @ 12:28), Max: 36.3 (03-12-24 @ 12:28)  HR: 74 (03-12-24 @ 12:28) (74 - 74)  BP: 144/76 (03-12-24 @ 12:28) (144/76 - 144/76)  RR: 17 (03-12-24 @ 12:28) (17 - 17)  SpO2: 98% (03-12-24 @ 12:28) (98% - 98%)    #Labs: Hb 11(at baseline)    The patient was seen by Burn team in ED.  The patient is being admitted to medicine for the further management.

## 2024-03-12 NOTE — CONSULT NOTE ADULT - ASSESSMENT
#Petechial rash of unknown etiology    - consider medicine admission for further work up  - dermatology consult  - local wound care daily  - wash with soap and water, apply xeroform, wrap with kerlix and ACE  - can perform biopsy if indicated by primary team/dermatology

## 2024-03-12 NOTE — PATIENT PROFILE ADULT - CAREGIVER NAME
duane rosales

## 2024-03-12 NOTE — ED PROVIDER NOTE - OBJECTIVE STATEMENT
68y female with PMHx of hypothyroidism, DVT (on eliquis), metastatic renal cell carcinoma s/p L nephrectomy, distal pancreatectomy and splenectomy on chemotherapy Inlyta (follows with Dr. Voss at [facility]) who presents for bilateral lower extremity blistering and rash. Reports nonpruritic rash that began on bilateral thighs 68y female with PMHx of hypothyroidism, DVT (on eliquis), metastatic renal cell carcinoma s/p L nephrectomy, distal pancreatectomy and splenectomy on chemotherapy Inlyta (follows with Dr. Voss at [facility]) who presents for bilateral lower extremity blistering and rash. Reports nonpruritic rash that began on bilateral thighs and anterior lower legs, progressed to blistering 3 days ago. Now reports pain on anterior shins with rash. Was recently admitted in the hospital 2/25/24 for bloody stools. Last took her chemotherapy pill this AM. Denies fevers, chills, CP, SOB, n/v/d, abdominal pain, weakness, numbness, falls. No new medications.

## 2024-03-12 NOTE — ED PROVIDER NOTE - CLINICAL SUMMARY MEDICAL DECISION MAKING FREE TEXT BOX
68-year-old female presents for bilateral lower extremity rash.  On chemotherapy agents.  Physical exam with petechial rash noted.  Labs resulted at the time of my review were noted to be within acceptable parameters.  Burn consulted.  Recommendation for admission for further workup.  Started on parenteral antibiotics.  Admitted to medicine for evaluation by dermatology and burn.

## 2024-03-12 NOTE — ED PROVIDER NOTE - PHYSICAL EXAMINATION
VITAL SIGNS: I have reviewed nursing notes and confirm.  CONSTITUTIONAL: well-appearing, non-toxic, NAD  SKIN: Warm dry, normal skin turgor, bilateral lower extremity erythematous nonblanching rash, with bullae, negative Nikolsky extending up to thighs, posterior legs.   HEAD: NCAT  EYES: EOMI, PERRLA, no scleral icterus  ENT: Moist mucous membranes, normal pharynx with no erythema or exudates  NECK: Supple; non tender. Full ROM. No cervical LAD  CARD: RRR, no murmurs, rubs or gallops  RESP: clear to ausculation b/l.  No rales, rhonchi, or wheezing.  ABD: soft, + BS, non-tender, non-distended, no rebound or guarding. No CVA tenderness  EXT: Full ROM, no bony tenderness, no pedal edema, no calf tenderness  NEURO: normal motor. normal sensory. CN II-XII intact. Cerebellar testing normal. Normal gait.  PSYCH: Cooperative, appropriate.

## 2024-03-12 NOTE — ED ADULT TRIAGE NOTE - CHIEF COMPLAINT QUOTE
pt with weeping blisters to b/l legs that started approx. two weeks ago  currently on chemotherapy Detail Level: Zone

## 2024-03-12 NOTE — H&P ADULT - NSHPLABSRESULTS_GEN_ALL_CORE
LABS:                         11.0   6.42  )-----------( 245      ( 12 Mar 2024 14:41 )             33.4     03-12    135  |  101  |  18  ----------------------------<  70  4.1   |  22  |  0.9    Ca    8.8      12 Mar 2024 14:41    TPro  7.1  /  Alb  3.2<L>  /  TBili  0.3  /  DBili  x   /  AST  25  /  ALT  13  /  AlkPhos  91  03-12      Urinalysis Basic - ( 12 Mar 2024 14:41 )    Color: x / Appearance: x / SG: x / pH: x  Gluc: 70 mg/dL / Ketone: x  / Bili: x / Urobili: x   Blood: x / Protein: x / Nitrite: x   Leuk Esterase: x / RBC: x / WBC x   Sq Epi: x / Non Sq Epi: x / Bacteria: x                RADIOLOGY, EKG & ADDITIONAL TESTS: Reviewed.

## 2024-03-13 ENCOUNTER — RESULT REVIEW (OUTPATIENT)
Age: 69
End: 2024-03-13

## 2024-03-13 ENCOUNTER — TRANSCRIPTION ENCOUNTER (OUTPATIENT)
Age: 69
End: 2024-03-13

## 2024-03-13 LAB
ALBUMIN SERPL ELPH-MCNC: 2.9 G/DL — LOW (ref 3.5–5.2)
ALP SERPL-CCNC: 78 U/L — SIGNIFICANT CHANGE UP (ref 30–115)
ALT FLD-CCNC: 12 U/L — SIGNIFICANT CHANGE UP (ref 0–41)
ANION GAP SERPL CALC-SCNC: 12 MMOL/L — SIGNIFICANT CHANGE UP (ref 7–14)
AST SERPL-CCNC: 20 U/L — SIGNIFICANT CHANGE UP (ref 0–41)
BASOPHILS # BLD AUTO: 0.02 K/UL — SIGNIFICANT CHANGE UP (ref 0–0.2)
BASOPHILS NFR BLD AUTO: 0.2 % — SIGNIFICANT CHANGE UP (ref 0–1)
BILIRUB SERPL-MCNC: 0.3 MG/DL — SIGNIFICANT CHANGE UP (ref 0.2–1.2)
BUN SERPL-MCNC: 22 MG/DL — HIGH (ref 10–20)
C3 SERPL-MCNC: 122 MG/DL — SIGNIFICANT CHANGE UP (ref 81–157)
C4 SERPL-MCNC: 38 MG/DL — SIGNIFICANT CHANGE UP (ref 13–39)
CALCIUM SERPL-MCNC: 8.1 MG/DL — LOW (ref 8.4–10.5)
CHLORIDE SERPL-SCNC: 104 MMOL/L — SIGNIFICANT CHANGE UP (ref 98–110)
CO2 SERPL-SCNC: 20 MMOL/L — SIGNIFICANT CHANGE UP (ref 17–32)
CREAT SERPL-MCNC: 1 MG/DL — SIGNIFICANT CHANGE UP (ref 0.7–1.5)
DSDNA AB FLD-ACNC: <0.2 AI — SIGNIFICANT CHANGE UP
EGFR: 62 ML/MIN/1.73M2 — SIGNIFICANT CHANGE UP
ENA SS-A AB FLD IA-ACNC: 1.7 AI — HIGH
EOSINOPHIL # BLD AUTO: 0.01 K/UL — SIGNIFICANT CHANGE UP (ref 0–0.7)
EOSINOPHIL NFR BLD AUTO: 0.1 % — SIGNIFICANT CHANGE UP (ref 0–8)
GLUCOSE SERPL-MCNC: 78 MG/DL — SIGNIFICANT CHANGE UP (ref 70–99)
HBV CORE AB SER-ACNC: SIGNIFICANT CHANGE UP
HBV CORE IGM SER-ACNC: SIGNIFICANT CHANGE UP
HBV SURFACE AB SER-ACNC: SIGNIFICANT CHANGE UP
HBV SURFACE AG SER-ACNC: SIGNIFICANT CHANGE UP
HCT VFR BLD CALC: 31.4 % — LOW (ref 37–47)
HCV AB S/CO SERPL IA: 0.04 COI — SIGNIFICANT CHANGE UP
HCV AB SERPL-IMP: SIGNIFICANT CHANGE UP
HGB BLD-MCNC: 10.3 G/DL — LOW (ref 12–16)
HIV 1+2 AB+HIV1 P24 AG SERPL QL IA: SIGNIFICANT CHANGE UP
IMM GRANULOCYTES NFR BLD AUTO: 0.4 % — HIGH (ref 0.1–0.3)
LYMPHOCYTES # BLD AUTO: 1.37 K/UL — SIGNIFICANT CHANGE UP (ref 1.2–3.4)
LYMPHOCYTES # BLD AUTO: 16.8 % — LOW (ref 20.5–51.1)
MAGNESIUM SERPL-MCNC: 1.5 MG/DL — LOW (ref 1.8–2.4)
MCHC RBC-ENTMCNC: 31.7 PG — HIGH (ref 27–31)
MCHC RBC-ENTMCNC: 32.8 G/DL — SIGNIFICANT CHANGE UP (ref 32–37)
MCV RBC AUTO: 96.6 FL — SIGNIFICANT CHANGE UP (ref 81–99)
MONOCYTES # BLD AUTO: 1.05 K/UL — HIGH (ref 0.1–0.6)
MONOCYTES NFR BLD AUTO: 12.9 % — HIGH (ref 1.7–9.3)
NEUTROPHILS # BLD AUTO: 5.69 K/UL — SIGNIFICANT CHANGE UP (ref 1.4–6.5)
NEUTROPHILS NFR BLD AUTO: 69.6 % — SIGNIFICANT CHANGE UP (ref 42.2–75.2)
NRBC # BLD: 0 /100 WBCS — SIGNIFICANT CHANGE UP (ref 0–0)
PLATELET # BLD AUTO: 239 K/UL — SIGNIFICANT CHANGE UP (ref 130–400)
PMV BLD: 10.3 FL — SIGNIFICANT CHANGE UP (ref 7.4–10.4)
POTASSIUM SERPL-MCNC: 3.9 MMOL/L — SIGNIFICANT CHANGE UP (ref 3.5–5)
POTASSIUM SERPL-SCNC: 3.9 MMOL/L — SIGNIFICANT CHANGE UP (ref 3.5–5)
PROT SERPL-MCNC: 6.3 G/DL — SIGNIFICANT CHANGE UP (ref 6–8)
RBC # BLD: 3.25 M/UL — LOW (ref 4.2–5.4)
RBC # FLD: 17.5 % — HIGH (ref 11.5–14.5)
RHEUMATOID FACT SERPL-ACNC: <10 IU/ML — SIGNIFICANT CHANGE UP (ref 0–13)
SODIUM SERPL-SCNC: 136 MMOL/L — SIGNIFICANT CHANGE UP (ref 135–146)
WBC # BLD: 8.17 K/UL — SIGNIFICANT CHANGE UP (ref 4.8–10.8)
WBC # FLD AUTO: 8.17 K/UL — SIGNIFICANT CHANGE UP (ref 4.8–10.8)

## 2024-03-13 PROCEDURE — 88305 TISSUE EXAM BY PATHOLOGIST: CPT | Mod: 26

## 2024-03-13 PROCEDURE — 88346 IMFLUOR 1ST 1ANTB STAIN PX: CPT | Mod: 26

## 2024-03-13 PROCEDURE — 99231 SBSQ HOSP IP/OBS SF/LOW 25: CPT | Mod: FS

## 2024-03-13 PROCEDURE — 88350 IMFLUOR EA ADDL 1ANTB STN PX: CPT | Mod: 26

## 2024-03-13 PROCEDURE — 99232 SBSQ HOSP IP/OBS MODERATE 35: CPT

## 2024-03-13 RX ORDER — MORPHINE SULFATE 50 MG/1
2 CAPSULE, EXTENDED RELEASE ORAL ONCE
Refills: 0 | Status: DISCONTINUED | OUTPATIENT
Start: 2024-03-13 | End: 2024-03-13

## 2024-03-13 RX ORDER — LIDOCAINE HYDROCHLORIDE AND EPINEPHRINE 10; 10 MG/ML; UG/ML
1 INJECTION, SOLUTION INFILTRATION; PERINEURAL ONCE
Refills: 0 | Status: DISCONTINUED | OUTPATIENT
Start: 2024-03-13 | End: 2024-03-16

## 2024-03-13 RX ORDER — MAGNESIUM SULFATE 500 MG/ML
2 VIAL (ML) INJECTION
Refills: 0 | Status: COMPLETED | OUTPATIENT
Start: 2024-03-13 | End: 2024-03-13

## 2024-03-13 RX ORDER — BACITRACIN ZINC 500 UNIT/G
1 OINTMENT IN PACKET (EA) TOPICAL
Refills: 0 | Status: DISCONTINUED | OUTPATIENT
Start: 2024-03-13 | End: 2024-03-16

## 2024-03-13 RX ORDER — AXITINIB 1 MG/1
5 TABLET, FILM COATED ORAL EVERY 12 HOURS
Refills: 0 | Status: DISCONTINUED | OUTPATIENT
Start: 2024-03-13 | End: 2024-03-16

## 2024-03-13 RX ORDER — MUPIROCIN 20 MG/G
1 OINTMENT TOPICAL
Refills: 0 | Status: DISCONTINUED | OUTPATIENT
Start: 2024-03-13 | End: 2024-03-16

## 2024-03-13 RX ADMIN — Medication 1 TABLET(S): at 11:56

## 2024-03-13 RX ADMIN — MUPIROCIN 1 APPLICATION(S): 20 OINTMENT TOPICAL at 17:51

## 2024-03-13 RX ADMIN — Medication 100 MILLIGRAM(S): at 22:13

## 2024-03-13 RX ADMIN — Medication 650 MILLIGRAM(S): at 05:35

## 2024-03-13 RX ADMIN — MORPHINE SULFATE 2 MILLIGRAM(S): 50 CAPSULE, EXTENDED RELEASE ORAL at 13:53

## 2024-03-13 RX ADMIN — MORPHINE SULFATE 2 MILLIGRAM(S): 50 CAPSULE, EXTENDED RELEASE ORAL at 14:30

## 2024-03-13 RX ADMIN — Medication 25 GRAM(S): at 11:57

## 2024-03-13 RX ADMIN — Medication 25 GRAM(S): at 14:45

## 2024-03-13 RX ADMIN — Medication 100 MILLIGRAM(S): at 05:06

## 2024-03-13 RX ADMIN — Medication 650 MILLIGRAM(S): at 05:07

## 2024-03-13 RX ADMIN — APIXABAN 2.5 MILLIGRAM(S): 2.5 TABLET, FILM COATED ORAL at 17:48

## 2024-03-13 RX ADMIN — Medication 650 MILLIGRAM(S): at 13:59

## 2024-03-13 RX ADMIN — AXITINIB 5 MILLIGRAM(S): 1 TABLET, FILM COATED ORAL at 17:48

## 2024-03-13 RX ADMIN — Medication 650 MILLIGRAM(S): at 13:22

## 2024-03-13 RX ADMIN — PANTOPRAZOLE SODIUM 40 MILLIGRAM(S): 20 TABLET, DELAYED RELEASE ORAL at 05:05

## 2024-03-13 RX ADMIN — Medication 25 MICROGRAM(S): at 05:05

## 2024-03-13 RX ADMIN — Medication 100 MILLIGRAM(S): at 13:22

## 2024-03-13 RX ADMIN — Medication 1 APPLICATION(S): at 17:49

## 2024-03-13 RX ADMIN — APIXABAN 2.5 MILLIGRAM(S): 2.5 TABLET, FILM COATED ORAL at 05:05

## 2024-03-13 NOTE — DISCHARGE NOTE NURSING/CASE MANAGEMENT/SOCIAL WORK - CAREGIVER RELATION TO PATIENT
7/27/2017  [facility]    Subjective:   Nash Mccabe is a 16 y.o. male    Chief Complaint   Patient presents with    Sports Physical         History of Present Illness:  Here with the mother for school physical.    Review of Systems:  Negative  Past Medical History:    No history of asthma, hospitalizations, surgery. No Known Allergies   reports that he has never smoked. He has never used smokeless tobacco. He reports that he does not drink alcohol or use illicit drugs. Objective:     Visit Vitals    Ht 5' 6.3\" (1.684 m)    Wt 195 lb (88.5 kg)    BMI 31.19 kg/m2       No results found for any visits on 07/27/17. Physical Examination:   See school physical form    Assessment / Plan:       ICD-10-CM ICD-9-CM    1. School physical exam Z02.0 V70.5      Encounter Diagnoses   Name Primary?     School physical exam Yes       School form completed  Anticipatory guidance given- handout and reviewed  Expressed understanding; used   SHAWN Castle MD sister

## 2024-03-13 NOTE — PHYSICAL THERAPY INITIAL EVALUATION ADULT - LEVEL OF INDEPENDENCE: SIT/SUPINE, REHAB EVAL
contact guard
received report.  received pt resting quietly in bed.  pt is calm and cooperative in unit.  as per pt she still hears voices sometimes. when ask if she is hearing them now she has a difficult time answering when asked if she came hear what they are saying states just crazy people.  no harm to   self or others

## 2024-03-13 NOTE — PROGRESS NOTE ADULT - SUBJECTIVE AND OBJECTIVE BOX
Notification of Discharge   This is a Notification of Discharge from our facility [address]  Please be advised that this patient has been discharge from our facility  Below you will find the admission and discharge date and time including the patients disposition  UTILIZATION REVIEW CONTACT:  Tomas Jones  Utilization   Network Utilization Review Department  Phone: [phone] x carefully listen to the prompts  All voicemails are confidential   Email: [email or web address]     PHYSICIAN ADVISORY SERVICES:  FOR UQHR-UX-MMGI REVIEW - MEDICAL NECESSITY DENIAL  Phone: [phone]  Fax: [phone]  Email: [email or web address]     PRESENTATION DATE: 6/5/2021  3:46 PM  OBERVATION ADMISSION DATE:   INPATIENT ADMISSION DATE: 6/5/21 2043   DISCHARGE DATE: 6/7/2021  2:54 PM  DISPOSITION: [facility]      IMPORTANT INFORMATION:  Send all requests for admission clinical reviews, approved or denied determinations and any other requests to dedicated fax number below belonging to the campus where the patient is receiving treatment   List of dedicated fax numbers:  [address] DENIALS (Administrative/Medical Necessity) [phone]   [address] (Maternity/NICU/Pediatrics) [phone]   [facility] [phone]   [facility] [phone]   Estiven Singh [phone]   [facility] [address] [place] [address] [phone]   [facility]  [phone]   [address] [place] [address]  [address] [facility] 1000 [facility] [phone] 24H events:    Patient is a 68y old Female who presents with a chief complaint of LE rash (13 Mar 2024 10:46)    Primary diagnosis of Rash    Today is hospital day 1d. This morning patient was seen and examined at bedside, resting comfortably in bed.    No acute or major events overnight.    Code Status:    Family communication:  Contact date:  Name of person contacted:  Relationship to patient:  Communication details:  What matters most:    PAST MEDICAL & SURGICAL HISTORY  Renal cell carcinoma    Hypertension    Breast cancer    Deep vein thrombosis (DVT)    H/O left nephrectomy    H/O partial adrenalectomy    History of partial pancreatectomy    S/P splenectomy    H/O lumpectomy      SOCIAL HISTORY:  Social History:      ALLERGIES:  penicillins (Angioedema)  fish (Unknown)    MEDICATIONS:  STANDING MEDICATIONS  amLODIPine   Tablet 5 milliGRAM(s) Oral daily  apixaban 2.5 milliGRAM(s) Oral every 12 hours  axitinib 5 milliGRAM(s) Oral every 12 hours  bacitracin   Ointment 1 Application(s) Topical two times a day  calcium carbonate 1250 mG  + Vitamin D (OsCal 500 + D) 1 Tablet(s) Oral daily  clindamycin IVPB 600 milliGRAM(s) IV Intermittent every 8 hours  clindamycin IVPB      levothyroxine 25 MICROGram(s) Oral daily  lidocaine 1%/epinephrine 1:100,000 Inj 1 Vial(s) Local Injection once  magnesium sulfate  IVPB 2 Gram(s) IV Intermittent every 2 hours  pantoprazole    Tablet 40 milliGRAM(s) Oral before breakfast    PRN MEDICATIONS  acetaminophen     Tablet .. 650 milliGRAM(s) Oral every 6 hours PRN  aluminum hydroxide/magnesium hydroxide/simethicone Suspension 30 milliLiter(s) Oral every 4 hours PRN  melatonin 3 milliGRAM(s) Oral at bedtime PRN  morphine  - Injectable 2 milliGRAM(s) IV Push once PRN  ondansetron Injectable 4 milliGRAM(s) IV Push every 8 hours PRN    VITALS:   T(F): 97.4  HR: 63  BP: 100/56  RR: 18  SpO2: 98%    PHYSICAL EXAM:       CONSTITUTIONAL: Well groomed, no apparent distress  EYES: PERRLA and symmetric, EOMI, No conjunctival or scleral injection, non-icteric  ENMT: Oral mucosa with moist membranes.   RESP: No respiratory distress, no use of accessory muscles; CTA b/l, no WRR  CV: RRR, +S1S2, no MRG; no JVD; no peripheral edema  GI: Soft, NT, ND, no rebound, no guarding  LYMPH: No cervical LAD or tenderness  MSK: No digital clubbing or cyanosis  SKIN: petechial rash to bilateral lower legs with small areas of partial thickness wounds, open wound present over right posterior leg  NEURO: CN II-XII intact; normal reflexes in upper and lower extremities, sensation intact in upper and lower extremities b/l to light touch   PSYCH: Appropriate insight/judgment; A+O x 3, mood and affect appropriate, recent/remote memory intact        (  ) Indwelling George Catheter:   Date insterted:    Reason (  ) Critical illness     (  ) urinary retention    (  ) Accurate Ins/Outs Monitoring     (  ) CMO patient    (  ) Central Line:   Date inserted:  Location: (  ) Right IJ     (  ) Left IJ     (  ) Right Fem     (  ) Left Fem    (  ) SPC        (  ) pigtail       (  ) PEG tube       (  ) colostomy       (  ) jejunostomy  (  ) U-Dall    LABS:                        10.3   8.17  )-----------( 239      ( 13 Mar 2024 05:39 )             31.4     03-13    136  |  104  |  22<H>  ----------------------------<  78  3.9   |  20  |  1.0    Ca    8.1<L>      13 Mar 2024 05:39  Mg     1.5     03-13    TPro  6.3  /  Alb  2.9<L>  /  TBili  0.3  /  DBili  x   /  AST  20  /  ALT  12  /  AlkPhos  78  03-13      Urinalysis Basic - ( 13 Mar 2024 05:39 )    Color: x / Appearance: x / SG: x / pH: x  Gluc: 78 mg/dL / Ketone: x  / Bili: x / Urobili: x   Blood: x / Protein: x / Nitrite: x   Leuk Esterase: x / RBC: x / WBC x   Sq Epi: x / Non Sq Epi: x / Bacteria: x                RADIOLOGY:

## 2024-03-13 NOTE — CONSULT NOTE ADULT - ASSESSMENT
A 68Y/F with PMHx of Hypothyroidism, DVT (on eliquis), metastatic renal cell carcinoma s/p L nephrectomy, distal pancreatectomy and splenectomy on chemotherapy Inlyta (follows with Dr. Voss at [facility]) presented to the ED  for evaluation of bilateral lower extremity blistering and rash. Pt states rash started >2 weeks ago on bilateral thighs and anterior lower legs, was recently admitted to [facility] for GIB/blood in stool. At the time was treated with vitamin A&D ointment and advised to follow up with outpatient dermatology. Since discharge, rash on the thighs has improved , however leg areas developed small serous filled blisters 3 days ago.    #Petechial Rash : Ddx: Infectious Vs Vasculitis Vs Stasis Dermatitis Vs Idiopathic  -Local wound care for now: bacitracin/xeroform/kerlix/ACE bandage BID  -Hep B, Hep C serology  -f/U PRIMO, anti-ds DNA, anti-Johnson Ab, anti-Josefina, anto-La Ab  -F/U C3, C4, Cryoglobulin, ANCA  -F/U RF, ESR , CRP, Wcx  -C/w Empiric Abs(Clinda as the patient is allergic to penicillin) due to open wound on right posterior leg  -Plan for skin biopsy today vs tomorrow     A 68Y/F with PMHx of Hypothyroidism, DVT (on eliquis), metastatic renal cell carcinoma s/p L nephrectomy, distal pancreatectomy and splenectomy on chemotherapy Inlyta (follows with Dr. Voss at [facility]) presented to the ED  for evaluation of bilateral lower extremity blistering and rash. Pt states rash started >2 weeks ago on bilateral thighs and anterior lower legs, was recently admitted to [facility] for GIB/blood in stool. At the time was treated with vitamin A&D ointment and advised to follow up with outpatient dermatology. Since discharge, rash on the thighs has improved , however leg areas developed small serous filled blisters 3 days ago.    #Petechial Rash : Ddx: Infectious Vs Vasculitis Vs Stasis Dermatitis Vs Idiopathic  -Local wound care for now: bacitracin/xeroform/kerlix/ACE bandage BID  -Hep B, Hep C serology  -f/U PRIMO, anti-ds DNA, anti-Johnson Ab, anti-Josefina, anto-La Ab  -F/U C3, C4, Cryoglobulin, ANCA  -F/U RF, ESR , CRP, Wcx  -C/w Empiric Abs(Clinda as the patient is allergic to penicillin) due to open wound on right posterior leg  - Add mupirocin ointment  - Burn team wound recs       A 68Y/F with PMHx of Splenectomy, Hypothyroidism, DVT (on eliquis), metastatic renal cell carcinoma s/p L nephrectomy, distal pancreatectomy and splenectomy on chemotherapy Inlyta (follows with Dr. Voss at [facility]) who presented to the ED  for evaluation of tender bilateral lower extremity rashes with blisters.  # Stasis skin changes following lower legs edema now with tender edema blister remnants and scaly patches with tender skin fissures/ partial thickness wound and purpuric skin lesions: possible contributions from  impetiginization/ infection vs Vasculitis vs others (medications like calcium channel blockers, idiopathic, etc) can't be ruled out at this time:  -Continue local wound care as per Burn team, including xeroform/kerlix/ACE bandage BID and can use mupirocin ointment bid.  - Follow up the results for the skin biopsy done by the Burn team and labs: Hep B, Hep C serology, PRIMO, anti-ds DNA, anti-Johnson Ab, anti-Josefina, anto-La Ab, C3, C4, Cryoglobulins, ANCA, RF, ESR , CRP, TSH, Wcx  -C/w current empiric antibiotic Clindamycin IV as the patient is allergic to penicillin).    Please call with any questions or if the dermatologic conditions become worse.

## 2024-03-13 NOTE — DISCHARGE NOTE NURSING/CASE MANAGEMENT/SOCIAL WORK - INFLUENZA IMMUNIZATION DATE (APPROXIMATE):
Patient is here today with questions and concerns.  Her last pap showed: 03/26/2021/HPV-/(ASC-US).      Current form of birth control Hysterectomy and is satisfied.  Latex:  Patient denies allergy to latex.  Medications reviewed with patient.  Tobacco use verified.  Allergies verified.    Patient would like communication of their results via:   [facility]     Patient's current [facility] status: Active.     Chaperone needed:  No         08-Nov-2023

## 2024-03-13 NOTE — BRIEF OPERATIVE NOTE - NSICDXBRIEFPROCEDURE_GEN_ALL_CORE_FT
PROCEDURES:  Open incisional biopsy of skin of lower extremity 13-Mar-2024 17:10:01 right leg Rui Hi

## 2024-03-13 NOTE — PROGRESS NOTE ADULT - ASSESSMENT
A 68Y/F with PMHx of Hypothyroidism, DVT (on eliquis), metastatic renal cell carcinoma s/p L nephrectomy, distal pancreatectomy and splenectomy on chemotherapy Inlyta (follows with Dr. Voss at [facility]) presented to the ED  for evaluation of bilateral lower extremity blistering and rash. Pt states rash started >2 weeks ago on bilateral thighs and anterior lower legs, was recently admitted to [facility] for GIB/blood in stool. At the time was treated with vitamin A&D ointment and advised to follow up with outpatient dermatology. Since discharge, rash on the thighs has improved , however leg areas developed small serous filled blisters 3 days ago.      #Petechial Rash : Ddx: Infectious Vs Vasculitis Vs Stasis Dermatitis Vs Idiopathic  -Local wound care  -Dermatology consult  -Hep B, Hep C serology  -f/U PRIMO, anti-ds DNA, anti-Johnson Ab, anti-Josefina, anto-La Ab  -F/U C3, C4, Cryoglobulin, ANCA  -F/U RF, ESR , CRP  -will treat with Empiric Abs(Clinda as the patient is allergic to penicillin) due to open wound on right posterior leg  - Plan for skin biopsy today vs tomorrow  - Local wound care for now: bacitracin/xeroform/kerlix/ACE bandage BID  - Appreciate dermatology c/s  - wcx taken 3/13, f/u    #H/o renal cell CA   - s/p  L nephrectomy  -continue with Inlyta 5mg 2tab OD    # H/O DVT  - c/w Eliquis     # H/O Hypothyroidism   - c/w synthroid     #MISC:  -DVT ppx: Eliquis  -GI ppx: PPI  -Diet: DASH  -Activity: IAT  -Code status: Full code

## 2024-03-13 NOTE — PROGRESS NOTE ADULT - SUBJECTIVE AND OBJECTIVE BOX
Patient is a 68y old  Female who presents with a chief complaint of LE rash (13 Mar 2024 11:56)    Patient was seen and examined.   C/o lower ext erash with blister    PAST MEDICAL & SURGICAL HISTORY:  Renal cell carcinoma  Hypertension  Breast cancer  Deep vein thrombosis (DVT)  H/O left nephrectomy  H/O partial adrenalectomy  History of partial pancreatectomy  S/P splenectomy  H/O lumpectomy    Allergies  penicillins (Angioedema)  fish (Unknown)    MEDICATIONS  (STANDING):  amLODIPine   Tablet 5 milliGRAM(s) Oral daily  apixaban 2.5 milliGRAM(s) Oral every 12 hours  axitinib 5 milliGRAM(s) Oral every 12 hours  bacitracin   Ointment 1 Application(s) Topical two times a day  calcium carbonate 1250 mG  + Vitamin D (OsCal 500 + D) 1 Tablet(s) Oral daily  clindamycin IVPB 600 milliGRAM(s) IV Intermittent every 8 hours  clindamycin IVPB      levothyroxine 25 MICROGram(s) Oral daily  lidocaine 1%/epinephrine 1:100,000 Inj 1 Vial(s) Local Injection once  magnesium sulfate  IVPB 2 Gram(s) IV Intermittent every 2 hours  pantoprazole    Tablet 40 milliGRAM(s) Oral before breakfast    MEDICATIONS  (PRN):  acetaminophen     Tablet .. 650 milliGRAM(s) Oral every 6 hours PRN Temp greater or equal to 38C (100.4F), Mild Pain (1 - 3)  aluminum hydroxide/magnesium hydroxide/simethicone Suspension 30 milliLiter(s) Oral every 4 hours PRN Dyspepsia  melatonin 3 milliGRAM(s) Oral at bedtime PRN Insomnia  morphine  - Injectable 2 milliGRAM(s) IV Push once PRN before skin biopsy  ondansetron Injectable 4 milliGRAM(s) IV Push every 8 hours PRN Nausea and/or Vomiting    Vital Signs Last 24 Hrs  T(C): 36.3  T(F): 97.4  HR: 63  BP: 100/56  BP(mean): --  RR: 18  SpO2: --  O/E:  Awake, alert, not in distress.  HEENT: atraumatic, EOMI.  Chest: clear.  CVS: SIS2 +, no murmur.  P/A: Soft, BS+  CNS: awake, alert  Ext: lower ext wounds with blisters, rash  All systems reviewed positive findings as above.                            10.3<L>  8.17  )-----------( 239      ( 13 Mar 2024 05:39 )             31.4<L>                        11.0<L>  6.42  )-----------( 245      ( 12 Mar 2024 14:41 )             33.4<L>    03-13    136  |  104  |  22<H>  ----------------------------<  78  3.9   |  20  |  1.0  03-12    135  |  101  |  18  ----------------------------<  70  4.1   |  22  |  0.9    Ca    8.1<L>      13 Mar 2024 05:39  Ca    8.8      12 Mar 2024 14:41  Mg     1.5     03-13    TPro  6.3  /  Alb  2.9<L>  /  TBili  0.3  /  DBili  x   /  AST  20  /  ALT  12  /  AlkPhos  78  03-13  TPro  7.1  /  Alb  3.2<L>  /  TBili  0.3  /  DBili  x   /  AST  25  /  ALT  13  /  AlkPhos  91  03-12            Urinalysis Basic - ( 13 Mar 2024 05:39 )    Color: x / Appearance: x / SG: x / pH: x  Gluc: 78 mg/dL / Ketone: x  / Bili: x / Urobili: x   Blood: x / Protein: x / Nitrite: x   Leuk Esterase: x / RBC: x / WBC x   Sq Epi: x / Non Sq Epi: x / Bacteria: x

## 2024-03-13 NOTE — PHYSICAL THERAPY INITIAL EVALUATION ADULT - PERTINENT HX OF CURRENT PROBLEM, REHAB EVAL
A 68Y/F with PMHx of Hypothyroidism, DVT (on eliquis), metastatic renal cell carcinoma s/p L nephrectomy, distal pancreatectomy and splenectomy on chemotherapy Inlyta (follows with Dr. Voss at [facility]) presented to the ED  for evaluation of bilateral lower extremity blistering and rash. Pt states rash started >2 weeks ago on bilateral thighs and anterior lower legs, was recently admitted to [facility] for GIB/blood in stool. At the time was treated with vitamin A&D ointment and advised to follow up with outpatient dermatology. Since discharge, rash on the thighs has improved , however leg areas developed small serous filled blisters 3 days ago. Also endorses pain on anterior shins. Denied recent changes in medications, fever, chills, Chest pain, SOB, N/V/D, abdominal pain, dysuria, recent travel, trauma, sick contacts, dizziness or LOC.

## 2024-03-13 NOTE — PROGRESS NOTE ADULT - SUBJECTIVE AND OBJECTIVE BOX
Patient is a 68y old  Female who presents with a chief complaint of LE rash (12 Mar 2024 20:38)    AM rounds with       Vital Signs Last 24 Hrs  T(C): 36.3 (13 Mar 2024 00:00), Max: 36.3 (12 Mar 2024 12:28)  T(F): 97.4 (13 Mar 2024 00:00), Max: 97.4 (12 Mar 2024 12:28)  HR: 63 (13 Mar 2024 05:00) (63 - 74)  BP: 100/56 (13 Mar 2024 05:00) (100/56 - 144/76)  BP(mean): --  ABP: --  ABP(mean): --  RR: 18 (13 Mar 2024 00:00) (17 - 18)  SpO2: 98% (12 Mar 2024 12:28) (98% - 98%)    O2 Parameters below as of 12 Mar 2024 12:28  Patient On (Oxygen Delivery Method): room air      LABS:                        10.3   8.17  )-----------( 239      ( 13 Mar 2024 05:39 )             31.4       Color: x / Appearance: x / SG: x / pH: x  Gluc: 78 mg/dL / Ketone: x  / Bili: x / Urobili: x   Blood: x / Protein: x / Nitrite: x   Leuk Esterase: x / RBC: x / WBC x   Sq Epi: x / Non Sq Epi: x / Bacteria: x      MEDICATIONS  (STANDING):  amLODIPine   Tablet 5 milliGRAM(s) Oral daily  apixaban 2.5 milliGRAM(s) Oral every 12 hours  axitinib 5 milliGRAM(s) Oral every 12 hours  calcium carbonate 1250 mG  + Vitamin D (OsCal 500 + D) 1 Tablet(s) Oral daily  clindamycin IVPB 600 milliGRAM(s) IV Intermittent every 8 hours  clindamycin IVPB      levothyroxine 25 MICROGram(s) Oral daily  magnesium sulfate  IVPB 2 Gram(s) IV Intermittent every 2 hours  pantoprazole    Tablet 40 milliGRAM(s) Oral before breakfast    MEDICATIONS  (PRN):  acetaminophen     Tablet .. 650 milliGRAM(s) Oral every 6 hours PRN Temp greater or equal to 38C (100.4F), Mild Pain (1 - 3)  aluminum hydroxide/magnesium hydroxide/simethicone Suspension 30 milliLiter(s) Oral every 4 hours PRN Dyspepsia  melatonin 3 milliGRAM(s) Oral at bedtime PRN Insomnia  ondansetron Injectable 4 milliGRAM(s) IV Push every 8 hours PRN Nausea and/or Vomiting      PHYSICAL EXAM:    General: Patient laying in bed, in NAD  Wound: petechial rash to bilateral lower legs with small areas of partial thickness wounds. Small blister noted to RLE, decompressed to reveal serious drainage. No malodor, purulence, or active bleeding. TBSA <1% Patient is a 68y old  Female who presents with a chief complaint of LE rash (12 Mar 2024 20:38)    AM rounds with attending    Vital Signs Last 24 Hrs  T(C): 36.3 (13 Mar 2024 00:00), Max: 36.3 (12 Mar 2024 12:28)  T(F): 97.4 (13 Mar 2024 00:00), Max: 97.4 (12 Mar 2024 12:28)  HR: 63 (13 Mar 2024 05:00) (63 - 74)  BP: 100/56 (13 Mar 2024 05:00) (100/56 - 144/76)  BP(mean): --  ABP: --  ABP(mean): --  RR: 18 (13 Mar 2024 00:00) (17 - 18)  SpO2: 98% (12 Mar 2024 12:28) (98% - 98%)    O2 Parameters below as of 12 Mar 2024 12:28  Patient On (Oxygen Delivery Method): room air      LABS:                        10.3   8.17  )-----------( 239      ( 13 Mar 2024 05:39 )             31.4       Color: x / Appearance: x / SG: x / pH: x  Gluc: 78 mg/dL / Ketone: x  / Bili: x / Urobili: x   Blood: x / Protein: x / Nitrite: x   Leuk Esterase: x / RBC: x / WBC x   Sq Epi: x / Non Sq Epi: x / Bacteria: x      MEDICATIONS  (STANDING):  amLODIPine   Tablet 5 milliGRAM(s) Oral daily  apixaban 2.5 milliGRAM(s) Oral every 12 hours  axitinib 5 milliGRAM(s) Oral every 12 hours  calcium carbonate 1250 mG  + Vitamin D (OsCal 500 + D) 1 Tablet(s) Oral daily  clindamycin IVPB 600 milliGRAM(s) IV Intermittent every 8 hours  clindamycin IVPB      levothyroxine 25 MICROGram(s) Oral daily  magnesium sulfate  IVPB 2 Gram(s) IV Intermittent every 2 hours  pantoprazole    Tablet 40 milliGRAM(s) Oral before breakfast    MEDICATIONS  (PRN):  acetaminophen     Tablet .. 650 milliGRAM(s) Oral every 6 hours PRN Temp greater or equal to 38C (100.4F), Mild Pain (1 - 3)  aluminum hydroxide/magnesium hydroxide/simethicone Suspension 30 milliLiter(s) Oral every 4 hours PRN Dyspepsia  melatonin 3 milliGRAM(s) Oral at bedtime PRN Insomnia  ondansetron Injectable 4 milliGRAM(s) IV Push every 8 hours PRN Nausea and/or Vomiting      PHYSICAL EXAM:    General: Patient laying in bed, in NAD  Wound: petechial rash to bilateral lower legs with small areas of partial thickness wounds. Small blister noted to RLE, decompressed to reveal serious drainage. No malodor, purulence, or active bleeding. TBSA <1% Patient is a 68y old  Female who presents with a chief complaint of LE rash (12 Mar 2024 20:38)    AM rounds with attending    Vital Signs Last 24 Hrs  T(C): 36.3 (13 Mar 2024 00:00), Max: 36.3 (12 Mar 2024 12:28)  T(F): 97.4 (13 Mar 2024 00:00), Max: 97.4 (12 Mar 2024 12:28)  HR: 63 (13 Mar 2024 05:00) (63 - 74)  BP: 100/56 (13 Mar 2024 05:00) (100/56 - 144/76)  BP(mean): --  ABP: --  ABP(mean): --  RR: 18 (13 Mar 2024 00:00) (17 - 18)  SpO2: 98% (12 Mar 2024 12:28) (98% - 98%)    O2 Parameters below as of 12 Mar 2024 12:28  Patient On (Oxygen Delivery Method): room air      LABS:                        10.3   8.17  )-----------( 239      ( 13 Mar 2024 05:39 )             31.4       Color: x / Appearance: x / SG: x / pH: x  Gluc: 78 mg/dL / Ketone: x  / Bili: x / Urobili: x   Blood: x / Protein: x / Nitrite: x   Leuk Esterase: x / RBC: x / WBC x   Sq Epi: x / Non Sq Epi: x / Bacteria: x      MEDICATIONS  (STANDING):  amLODIPine   Tablet 5 milliGRAM(s) Oral daily  apixaban 2.5 milliGRAM(s) Oral every 12 hours  axitinib 5 milliGRAM(s) Oral every 12 hours  calcium carbonate 1250 mG  + Vitamin D (OsCal 500 + D) 1 Tablet(s) Oral daily  clindamycin IVPB 600 milliGRAM(s) IV Intermittent every 8 hours  clindamycin IVPB      levothyroxine 25 MICROGram(s) Oral daily  magnesium sulfate  IVPB 2 Gram(s) IV Intermittent every 2 hours  pantoprazole    Tablet 40 milliGRAM(s) Oral before breakfast    MEDICATIONS  (PRN):  acetaminophen     Tablet .. 650 milliGRAM(s) Oral every 6 hours PRN Temp greater or equal to 38C (100.4F), Mild Pain (1 - 3)  aluminum hydroxide/magnesium hydroxide/simethicone Suspension 30 milliLiter(s) Oral every 4 hours PRN Dyspepsia  melatonin 3 milliGRAM(s) Oral at bedtime PRN Insomnia  ondansetron Injectable 4 milliGRAM(s) IV Push every 8 hours PRN Nausea and/or Vomiting      PHYSICAL EXAM:    General: Patient laying in bed, in NAD  Wound: petechial rash to bilateral lower legs with small areas of partial thickness wounds. Small blister noted to LLE, decompressed to reveal serious drainage. No malodor, purulence, or active bleeding. TBSA <1%

## 2024-03-13 NOTE — DISCHARGE NOTE NURSING/CASE MANAGEMENT/SOCIAL WORK - NSDCPEELIQUISFU_GEN_ALL_CORE
Go for blood tests as directed. Your doctor will do lab tests at regular visits to monitor the effects of this medicine. Please follow up with your doctor and keep your health care provider appointments. Ketoconazole Pregnancy And Lactation Text: This medication is Pregnancy Category C and it isn't know if it is safe during pregnancy. It is also excreted in breast milk and breast feeding isn't recommended.

## 2024-03-13 NOTE — PROGRESS NOTE ADULT - ASSESSMENT
68Y/F with PMHx of Hypothyroidism, DVT (on eliquis), metastatic renal cell carcinoma s/p L nephrectomy, distal pancreatectomy and splenectomy on chemotherapy Inlyta (follows with Dr. Voss at [facility]) presented to the ED  for evaluation of bilateral lower extremity blistering and rash  #Labs: Hb 11(at baseline)    # Lower ext wounds with  blisters/rash with surrounding cellulitis  - c/w clinamycin  - F/u ESR/ CRP  - F/u wound culture  - Local wound care for now: bacitracin/xeroform/kerlix/ACE bandage BID  - evaluated by Burns team  - Dermatology eval    # Hypertension  - c/w norvasc    # H/o DVT  - c/w eliquis    # Hypothyroidism  - c/w synthroid    # H/o metastatic renal cell cancer s/p left  nephrectomy, distal pancreatectomy and splenectomy  - on Inlyta     #  Full code    # Pending:  F/u ESR/ CRP,  F/u wound culture,  Dermatology eval  # Disposition: Home when stable

## 2024-03-13 NOTE — CONSULT NOTE ADULT - ATTENDING COMMENTS
A 68Y/F with PMHx of Splenectomy, Hypothyroidism, DVT (on eliquis), metastatic renal cell carcinoma s/p L nephrectomy, distal pancreatectomy and splenectomy on chemotherapy Inlyta (follows with Dr. Voss at [facility]) who presented to the ED  for evaluation of tender bilateral lower extremity rashes with blisters.  # Stasis skin changes following lower legs edema now with tender edema blister remnants and scaly patches with tender skin fissures/ partial thickness wound and purpuric skin lesions: possible contributions from  impetiginization/ infection vs Vasculitis vs others (medications like calcium channel blockers, idiopathic, etc) can't be ruled out at this time:  -Continue local wound care as per Burn team, including xeroform/kerlix/ACE bandage BID and can use mupirocin ointment bid.  - Follow up the results for the skin biopsy done by the Burn team and labs: Hep B, Hep C serology, PRIMO, anti-ds DNA, anti-Johnson Ab, anti-Josefina, anto-La Ab, C3, C4, Cryoglobulins, ANCA, RF, ESR , CRP, TSH, Wcx  -C/w current empiric antibiotic Clindamycin IV as the patient is allergic to penicillin).    Please contact me on Teams with any questions or if the dermatologic conditions become worse.

## 2024-03-13 NOTE — CONSULT NOTE ADULT - SUBJECTIVE AND OBJECTIVE BOX
Patient is a 68y old Female who presents with a chief complaint of LE rash    Primary diagnosis of Rash    Today is hospital day 1d. This morning patient was seen and examined at bedside, resting comfortably in bed.    No acute or major events overnight.    CONSTITUTIONAL: Well groomed, no apparent distress  EYES: PERRLA and symmetric, EOMI, No conjunctival or scleral injection, non-icteric  ENMT: Oral mucosa with moist membranes.   RESP: No respiratory distress, no use of accessory muscles; CTA b/l, no WRR  CV: RRR, +S1S2, no MRG; no JVD; no peripheral edema  GI: Soft, NT, ND, no rebound, no guarding  LYMPH: No cervical LAD or tenderness  MSK: No digital clubbing or cyanosis  SKIN: petechial rash to bilateral lower legs with small areas of partial thickness wounds, open wound present over right posterior leg  NEURO: CN II-XII intact; normal reflexes in upper and lower extremities, sensation intact in upper and lower extremities b/l to light touch   PSYCH: Appropriate insight/judgment; A+O x 3, mood and affect appropriate, recent/remote memory intact Patient is a 68y old Female who presents with a chief complaint of tender rashes blt in the lower part of the legs, who presented to the hospital on 3/12 with weeping blisters on the blt lower legs and pain in the anterior lower legs. The rash on the lower legs was non-pruritic on the thighs and shins and developed over ~ 2 weeks,  after being discharged from [facility] (patient was admitted at [facility] for bloody stools on 2//25/24).  The rash was treated with A&D ointment and improved on the thighs but progressed on the lower legs .  ~ 3 days before the [facility] hospitalization there was worsening of blt lower legs with swelling, tenderness and  blisters.   The patient denied starting any new medication before being admitted to [facility] The patient says that there were no previous rashes or blisters on the legs before being admitted to [facility].  Currently the faint rashes above the knee  were not itchy and didn't bother the patient much (~7:30 pm on 3/13), but the lower extremities are tender (not itchy) and the blisters drained leaving blister remnants and tender scaly patches with skin fissures and erosions. The patient is being followed by the Burn team who suggested local wound care for the "petechial rash to bilateral lower legs with small areas of partial thickness wounds and serous drainage < 1% TSBA": wash with soap and water, apply xeroform, wrap with kerlix and ACE" and did an incisional biopsy on 3/13/24 on the R leg- patient reported no pain at the site of the biopsy (~on 7:30 pm on 3/13).     The patient was taking as of 2/28/24 synthroid, amlodipine, calcium- vit D3, escitalopram, everolimus, eliquis, inlyta, and lenvima. Patient denied fevers, mucosal lesions, h/a, N/V, SOB, focal neurologic deficits, joint pains.    PMHx: hypothyroidism, DVT (on eliquis), metastatic renal cell carcinoma s/p L nephrectomy, distal pancreatomy and splenectomy on chemotherapy Inlyta following an Onclologist at [facility].   Primary diagnosis of Rash    Allergy: PCN (angioedema, throat closing, hives; fish;        CONSTITUTIONAL: Well groomed, no apparent distress; The patient was seen and examined at bedside, resting comfortably in bed.   EYES: PERRLA and symmetric, EOMI, No conjunctival or scleral injection, non-icteric  ENMT: Oral mucosa with moist membranes.   RESP: No respiratory distress, no use of accessory muscles; CTA b/l, no WRR  CV: RRR, +S1S2, no MRG; no JVD; no peripheral edema  GI: Soft, NT, ND, no rebound, no guarding  LYMPH: No cervical LAD or tenderness  MSK: No digital clubbing or cyanosis  SKIN: tender scaly erythematous patches on lower legs with areas of fissured skin and partial thickness wounds and blister remnants after the blisters spontaneously drained, including open linear wound present over right lateral posterior leg;  some purpuric petechia, macules and patches on bilateral lower legs; faint xerotic eryth patches thighs; denies mucosal lesions;   NEURO: CN II-XII intact; normal reflexes in upper and lower extremities, sensation intact in upper and lower extremities b/l to light touch   PSYCH: Appropriate insight/judgment; A+O x 3, mood and affect appropriate, recent/remote memory intact

## 2024-03-13 NOTE — PROGRESS NOTE ADULT - ASSESSMENT
68Y/F, with PMHx of Hypothyroidism, DVT (on eliquis), metastatic renal cell carcinoma s/p L nephrectomy, distal pancreatectomy and splenectomy on chemotherapy Inlyta, with rash from unknown etiology.      Plan:     - Plan for skin biopsy today vs tomorrow  - Local wound care for now: bacitracin/xeroform/kerlix/ACE bandage BID  - Appreciate dermatology c/s  - wcx taken 3/13, f/u  - Remainder of care per primary care team  68Y/F, with PMHx of Hypothyroidism, DVT (on eliquis), metastatic renal cell carcinoma s/p L nephrectomy, distal pancreatectomy and splenectomy on chemotherapy Inlyta, with rash from unknown etiology for approx 2 weeks.    Plan:     - Plan for skin biopsy today vs tomorrow  - Local wound care for now: bacitracin/xeroform/kerlix/ACE bandage BID  - Appreciate dermatology c/s  - wcx taken 3/13, f/u  - Remainder of care per primary care team

## 2024-03-13 NOTE — PHYSICAL THERAPY INITIAL EVALUATION ADULT - ADDITIONAL COMMENTS
Patient lives in house with daughter and son-in-law. As per patient she has 1 step to enter home, her bedroom on 3rd floor. Claims to be independent in ADLs and ambulation no AD. Patient states she in mostly alone at daytime.

## 2024-03-13 NOTE — DISCHARGE NOTE NURSING/CASE MANAGEMENT/SOCIAL WORK - PATIENT PORTAL LINK FT
You can access the FollowMyHealth Patient Portal offered by [facility] by registering at the following website: [email or web address]. By joining [facility]’s FollowMyHealth portal, you will also be able to view your health information using other applications (apps) compatible with our system.

## 2024-03-14 ENCOUNTER — TRANSCRIPTION ENCOUNTER (OUTPATIENT)
Age: 69
End: 2024-03-14

## 2024-03-14 LAB
ALBUMIN SERPL ELPH-MCNC: 3 G/DL — LOW (ref 3.5–5.2)
ALP SERPL-CCNC: 83 U/L — SIGNIFICANT CHANGE UP (ref 30–115)
ALT FLD-CCNC: 12 U/L — SIGNIFICANT CHANGE UP (ref 0–41)
ANION GAP SERPL CALC-SCNC: 12 MMOL/L — SIGNIFICANT CHANGE UP (ref 7–14)
AST SERPL-CCNC: 21 U/L — SIGNIFICANT CHANGE UP (ref 0–41)
AUTO DIFF PNL BLD: ABNORMAL
BASOPHILS # BLD AUTO: 0.02 K/UL — SIGNIFICANT CHANGE UP (ref 0–0.2)
BASOPHILS NFR BLD AUTO: 0.3 % — SIGNIFICANT CHANGE UP (ref 0–1)
BILIRUB SERPL-MCNC: 0.2 MG/DL — SIGNIFICANT CHANGE UP (ref 0.2–1.2)
BUN SERPL-MCNC: 19 MG/DL — SIGNIFICANT CHANGE UP (ref 10–20)
C-ANCA SER-ACNC: NEGATIVE — SIGNIFICANT CHANGE UP
CALCIUM SERPL-MCNC: 7.6 MG/DL — LOW (ref 8.4–10.4)
CHLORIDE SERPL-SCNC: 104 MMOL/L — SIGNIFICANT CHANGE UP (ref 98–110)
CO2 SERPL-SCNC: 21 MMOL/L — SIGNIFICANT CHANGE UP (ref 17–32)
CREAT SERPL-MCNC: 1.2 MG/DL — SIGNIFICANT CHANGE UP (ref 0.7–1.5)
DSDNA AB SER-ACNC: 12 IU/ML — SIGNIFICANT CHANGE UP
EGFR: 50 ML/MIN/1.73M2 — LOW
EOSINOPHIL # BLD AUTO: 0.01 K/UL — SIGNIFICANT CHANGE UP (ref 0–0.7)
EOSINOPHIL NFR BLD AUTO: 0.1 % — SIGNIFICANT CHANGE UP (ref 0–8)
GLUCOSE SERPL-MCNC: 77 MG/DL — SIGNIFICANT CHANGE UP (ref 70–99)
HCT VFR BLD CALC: 29 % — LOW (ref 37–47)
HGB BLD-MCNC: 9.5 G/DL — LOW (ref 12–16)
IMM GRANULOCYTES NFR BLD AUTO: 0.3 % — SIGNIFICANT CHANGE UP (ref 0.1–0.3)
LYMPHOCYTES # BLD AUTO: 1.2 K/UL — SIGNIFICANT CHANGE UP (ref 1.2–3.4)
LYMPHOCYTES # BLD AUTO: 17.2 % — LOW (ref 20.5–51.1)
MAGNESIUM SERPL-MCNC: 2.1 MG/DL — SIGNIFICANT CHANGE UP (ref 1.8–2.4)
MCHC RBC-ENTMCNC: 32 PG — HIGH (ref 27–31)
MCHC RBC-ENTMCNC: 32.8 G/DL — SIGNIFICANT CHANGE UP (ref 32–37)
MCV RBC AUTO: 97.6 FL — SIGNIFICANT CHANGE UP (ref 81–99)
MONOCYTES # BLD AUTO: 1.14 K/UL — HIGH (ref 0.1–0.6)
MONOCYTES NFR BLD AUTO: 16.3 % — HIGH (ref 1.7–9.3)
MPO AB + PR3 PNL SER: SIGNIFICANT CHANGE UP
NEUTROPHILS # BLD AUTO: 4.59 K/UL — SIGNIFICANT CHANGE UP (ref 1.4–6.5)
NEUTROPHILS NFR BLD AUTO: 65.8 % — SIGNIFICANT CHANGE UP (ref 42.2–75.2)
NRBC # BLD: 0 /100 WBCS — SIGNIFICANT CHANGE UP (ref 0–0)
P-ANCA SER-ACNC: NEGATIVE — SIGNIFICANT CHANGE UP
PLATELET # BLD AUTO: 247 K/UL — SIGNIFICANT CHANGE UP (ref 130–400)
PMV BLD: 10.7 FL — HIGH (ref 7.4–10.4)
POTASSIUM SERPL-MCNC: 4 MMOL/L — SIGNIFICANT CHANGE UP (ref 3.5–5)
POTASSIUM SERPL-SCNC: 4 MMOL/L — SIGNIFICANT CHANGE UP (ref 3.5–5)
PROT SERPL-MCNC: 6.3 G/DL — SIGNIFICANT CHANGE UP (ref 6–8)
RBC # BLD: 2.97 M/UL — LOW (ref 4.2–5.4)
RBC # FLD: 17.6 % — HIGH (ref 11.5–14.5)
SODIUM SERPL-SCNC: 137 MMOL/L — SIGNIFICANT CHANGE UP (ref 135–146)
WBC # BLD: 6.98 K/UL — SIGNIFICANT CHANGE UP (ref 4.8–10.8)
WBC # FLD AUTO: 6.98 K/UL — SIGNIFICANT CHANGE UP (ref 4.8–10.8)

## 2024-03-14 PROCEDURE — 99232 SBSQ HOSP IP/OBS MODERATE 35: CPT

## 2024-03-14 RX ORDER — IBUPROFEN 200 MG
1 TABLET ORAL
Qty: 0 | Refills: 0 | DISCHARGE
Start: 2024-03-14

## 2024-03-14 RX ORDER — IBUPROFEN 200 MG
600 TABLET ORAL EVERY 8 HOURS
Refills: 0 | Status: DISCONTINUED | OUTPATIENT
Start: 2024-03-14 | End: 2024-03-16

## 2024-03-14 RX ORDER — MUPIROCIN 20 MG/G
1 OINTMENT TOPICAL
Qty: 1 | Refills: 0
Start: 2024-03-14

## 2024-03-14 RX ADMIN — Medication 600 MILLIGRAM(S): at 23:05

## 2024-03-14 RX ADMIN — Medication 100 MILLIGRAM(S): at 14:43

## 2024-03-14 RX ADMIN — Medication 1 APPLICATION(S): at 05:34

## 2024-03-14 RX ADMIN — Medication 25 MICROGRAM(S): at 05:30

## 2024-03-14 RX ADMIN — MUPIROCIN 1 APPLICATION(S): 20 OINTMENT TOPICAL at 05:34

## 2024-03-14 RX ADMIN — MUPIROCIN 1 APPLICATION(S): 20 OINTMENT TOPICAL at 18:30

## 2024-03-14 RX ADMIN — AXITINIB 5 MILLIGRAM(S): 1 TABLET, FILM COATED ORAL at 05:30

## 2024-03-14 RX ADMIN — PANTOPRAZOLE SODIUM 40 MILLIGRAM(S): 20 TABLET, DELAYED RELEASE ORAL at 05:30

## 2024-03-14 RX ADMIN — Medication 650 MILLIGRAM(S): at 15:30

## 2024-03-14 RX ADMIN — AXITINIB 5 MILLIGRAM(S): 1 TABLET, FILM COATED ORAL at 18:57

## 2024-03-14 RX ADMIN — Medication 100 MILLIGRAM(S): at 05:30

## 2024-03-14 RX ADMIN — Medication 650 MILLIGRAM(S): at 15:14

## 2024-03-14 RX ADMIN — Medication 650 MILLIGRAM(S): at 06:37

## 2024-03-14 RX ADMIN — APIXABAN 2.5 MILLIGRAM(S): 2.5 TABLET, FILM COATED ORAL at 18:28

## 2024-03-14 RX ADMIN — Medication 100 MILLIGRAM(S): at 22:09

## 2024-03-14 RX ADMIN — Medication 1 APPLICATION(S): at 18:30

## 2024-03-14 RX ADMIN — Medication 650 MILLIGRAM(S): at 05:37

## 2024-03-14 RX ADMIN — APIXABAN 2.5 MILLIGRAM(S): 2.5 TABLET, FILM COATED ORAL at 05:30

## 2024-03-14 RX ADMIN — Medication 600 MILLIGRAM(S): at 22:09

## 2024-03-14 RX ADMIN — AMLODIPINE BESYLATE 5 MILLIGRAM(S): 2.5 TABLET ORAL at 05:30

## 2024-03-14 RX ADMIN — Medication 600 MILLIGRAM(S): at 11:54

## 2024-03-14 NOTE — DISCHARGE NOTE PROVIDER - NSDCMRMEDTOKEN_GEN_ALL_CORE_FT
AMLODIPINE BESYLATE 5 MG TAB:   CALCIUM 600 MG-VIT D3 5 MCG TB: 2 tab(s) orally once a day  denosumab 60 mg/mL subcutaneous solution: 60 milligram(s) subcutaneously every 4 weeks  ELIQUIS 2.5 MG TABLET: 1 tab(s) orally 2 times a day  INLYTA 5 MG TABLET: 2 tab(s) orally once a day  SYNTHROID 25 MCG TABLET: 1 tab(s) orally once a day  vitamin A and D topical ointment: 1 Apply topically to affected area 2 times a day   AMLODIPINE BESYLATE 5 MG TAB:   CALCIUM 600 MG-VIT D3 5 MCG TB: 2 tab(s) orally once a day  denosumab 60 mg/mL subcutaneous solution: 60 milligram(s) subcutaneously every 4 weeks  ELIQUIS 2.5 MG TABLET: 1 tab(s) orally 2 times a day  ibuprofen 600 mg oral tablet: 1 tab(s) orally every 8 hours  INLYTA 5 MG TABLET: 2 tab(s) orally once a day  SYNTHROID 25 MCG TABLET: 1 tab(s) orally once a day  vitamin A and D topical ointment: 1 Apply topically to affected area 2 times a day   acetaminophen 325 mg oral tablet: 3 tab(s) orally every 8 hours  amLODIPine 5 mg oral tablet: 1 tab(s) orally once a day  bacitracin 500 units/g topical ointment: Apply topically to affected area 2 times a day 1 Apply topically to affected area 2 times a day  CALCIUM 600 MG-VIT D3 5 MCG TB: 2 tab(s) orally once a day  Cleocin HCl 300 mg oral capsule: 1 cap(s) orally 4 times a day Last date of antibiotics 3/19  denosumab 60 mg/mL subcutaneous solution: 60 milligram(s) subcutaneously every 4 weeks  ELIQUIS 2.5 MG TABLET: 1 tab(s) orally 2 times a day  INLYTA 5 MG TABLET: 2 tab(s) orally once a day  SYNTHROID 25 MCG TABLET: 1 tab(s) orally once a day  traMADol 50 mg oral tablet: 1 tab(s) orally every 8 hours as needed for Severe Pain (7 - 10) MDD: 3 tablets  vitamin A and D topical ointment: 1 Apply topically to affected area 2 times a day

## 2024-03-14 NOTE — DISCHARGE NOTE PROVIDER - NSDCCPCAREPLAN_GEN_ALL_CORE_FT
PRINCIPAL DISCHARGE DIAGNOSIS  Diagnosis: Rash  Assessment and Plan of Treatment: Take the prescribed antibiotic medicine you are given as directed until it is gone. Take it even if you feel better. It treats the infection and stops it from returning. Not taking all the medicine can make future infections hard to treat. Keep the infected area clean. When possible, raise the infected area above the level of your heart. This helps keep swelling down. Apply clean bandages as advised. Wash your hands often to prevent spreading the infection. In the future, wash your hands before and after you touch cuts, scratches, or bandages. This will help prevent infection.   Call your doctor or returnt o the emergency room or call 911 immediately if you have any of the following: Difficulty or pain when moving the joints above or below the infected area, Discharge or pus draining from the area, rever of 100.4°F (38°C) or higher, or as directed by your healthcare provider, pain that gets worse in or around the infected site, redness that gets worse in or around the infected area, particularly if the area of redness expands to a wider area, shaking chills, swelling of the infected area, vomiting.  The results of your workup are still pending including the biopsy. Please follow up with your PCP for these.        PRINCIPAL DISCHARGE DIAGNOSIS  Diagnosis: Rash  Assessment and Plan of Treatment: Take the prescribed antibiotic medicine you are given as directed until it is gone. Take it even if you feel better. It treats the infection and stops it from returning. Not taking all the medicine can make future infections hard to treat. Keep the infected area clean. When possible, raise the infected area above the level of your heart. This helps keep swelling down. Apply clean bandages as advised. Wash your hands often to prevent spreading the infection. In the future, wash your hands before and after you touch cuts, scratches, or bandages. This will help prevent infection.   Call your doctor or returnt o the emergency room or call 911 immediately if you have any of the following: Difficulty or pain when moving the joints above or below the infected area, Discharge or pus draining from the area, rever of 100.4°F (38°C) or higher, or as directed by your healthcare provider, pain that gets worse in or around the infected site, redness that gets worse in or around the infected area, particularly if the area of redness expands to a wider area, shaking chills, swelling of the infected area, vomiting.  The results of your workup are still pending including the biopsy. Please follow up with your PCP for these.   Wound care: bacitracin/xeroform/kerlix/ACE bandage twice a day

## 2024-03-14 NOTE — PROGRESS NOTE ADULT - ASSESSMENT
68Y/F with PMHx of Hypothyroidism, DVT (on eliquis), metastatic renal cell carcinoma s/p L nephrectomy, distal pancreatectomy and splenectomy on chemotherapy Inlyta (follows with Dr. Voss at [facility]) presented to the ED  for evaluation of bilateral lower extremity blistering and rash  #Labs: Hb 11(at baseline)    # Lower ext wounds with  blisters/rash with surrounding cellulitis  - c/w clindamycin  - F/u wound culture  - Local wound care for now: bacitracin/xeroform/kerlix/ACE bandage BID  - evaluated by Burns team S/p biopsy  - Dermatology eval: Stasis skin changes following lower legs edema now with tender edema blister remnants and scaly patches with tender skin fissures/ partial thickness wound and purpuric skin lesions: possible contributions from  impetiginization/ infection vs Vasculitis vs others (medications like calcium channel blockers, idiopathic, etc) can't be ruled out at this time:  - Hepatitis profile neg  - HIV-1/2 Combo Result: Nonreact: (03.13.24 @ 05:39  - F/u PRIMO, anti-ds DNA, anti-Johnson Ab, anti-Josefina, anto-La Ab, C3, C4, Cryoglobulins, ANCA, RF, ESR , CRP, TSH  - Start motrin for pain    # Hypertension  - c/w norvasc    # H/o DVT  - c/w eliquis    # Hypothyroidism  - c/w synthroid    # H/o metastatic renal cell cancer s/p left  nephrectomy, distal pancreatectomy and splenectomy  - on Inlyta     #  Full code    # Pending:  anticipate for discharge in AM  # Disposition: Home when stable

## 2024-03-14 NOTE — DISCHARGE NOTE PROVIDER - NSDCFUADDINST_GEN_ALL_CORE_FT
Wound care: bacitracin/xeroform/kerlix/ACE bandage twice a day Wound care: bacitracin/xeroform/kerlix/ACE bandage twice a day  F/u Skin biopsy results

## 2024-03-14 NOTE — DISCHARGE NOTE PROVIDER - CARE PROVIDER_API CALL
Demetra Murray  Internal Medicine  [address] [place] [id number]  Phone: [phone]  Fax: ()-  Follow Up Time: 1 week    Negro Altman  Dermatology  [address] [place] [id number]  Phone: [phone]  Fax: [phone]  Follow Up Time: 1 week

## 2024-03-14 NOTE — DISCHARGE NOTE PROVIDER - ATTENDING DISCHARGE PHYSICAL EXAMINATION:
T(C): 36.5 (03-16-24 @ 07:00), Max: 36.5 (03-16-24 @ 07:00)  HR: 55 (03-16-24 @ 07:00) (55 - 69)  BP: 119/70 (03-16-24 @ 07:00) (119/70 - 125/72)  RR: 18 (03-16-24 @ 07:00) (18 - 18)  SpO2: --  O/E:  Awake, alert, not in distress.  HEENT: atraumatic, EOMI.  Chest: clear.  CVS: SIS2 +, no murmur.  P/A: Soft, BS+  CNS: awake, alert  Ext: lower ext wounds +, dressing+  All systems reviewed positive findings as above.

## 2024-03-14 NOTE — DISCHARGE NOTE PROVIDER - NSDCFUSCHEDAPPT_GEN_ALL_CORE_FT
Steffen Yates  [facility] PreAdmits  Scheduled Appointment: 04/17/2024    Paula Ovalle  [facility]  GASTRO  [facility]  Scheduled Appointment: 04/17/2024

## 2024-03-14 NOTE — DISCHARGE NOTE PROVIDER - HOSPITAL COURSE
A 68Y/F with PMHx of Hypothyroidism, DVT (on eliquis), metastatic renal cell carcinoma s/p L nephrectomy, distal pancreatectomy and splenectomy on chemotherapy Inlyta (follows with Dr. Voss at [facility]) presented to the ED  for evaluation of bilateral lower extremity blistering and rash. Pt states rash started >2 weeks ago on bilateral thighs and anterior lower legs, was recently admitted to [facility] for GIB/blood in stool. She underwent biopsy with Burn. Dermatology recommended workup the results of which are still pending. She was empirically treated with IV clindamycin (allergic to penicillin).   Patient is medically stable and ready for discharge.            A 68Y/F with PMHx of Hypothyroidism, DVT (on eliquis), metastatic renal cell carcinoma s/p L nephrectomy, distal pancreatectomy and splenectomy on chemotherapy Inlyta (follows with Dr. Voss at [facility]) presented to the ED  for evaluation of bilateral lower extremity blistering and rash. Pt states rash started >2 weeks ago on bilateral thighs and anterior lower legs, was recently admitted to [facility] for GIB/blood in stool. She underwent biopsy with Burn. Dermatology recommended workup the results of which are still pending. She was empirically treated with IV clindamycin (allergic to penicillin).   Patient is medically stable and ready for discharge.    #Petechial Rash : Ddx: Infectious Vs Vasculitis Vs Stasis Dermatitis Vs Idiopathic  -Local wound care  -Dermatology consult appreciated  -Hep B, Hep C serology negative  -f/U PRIMO, anti-ds DNA, anti-Johnson Ab, anti-Josefina, anto-La Ab  -F/U C3, C4, Cryoglobulin: pending  - atypical ANCA indeterminate  - F/U RF, ESR , CRP  -will treat with Empiric Abs (Clinda as the patient is allergic to penicillin) due to open wound on right posterior leg  - Plan for skin biopsy today vs tomorrow  - Local wound care for now: bacitracin/xeroform/kerlix/ACE bandage BID  - wcx taken 3/13, f/u    #H/o renal cell CA   - s/p L nephrectomy  -continue with Inlyta 5mg 2tab OD    # H/O DVT  - c/w Eliquis     # H/O Hypothyroidism   - c/w synthroid           A 68Y/F with PMHx of Hypothyroidism, DVT (on eliquis), metastatic renal cell carcinoma s/p L nephrectomy, distal pancreatectomy and splenectomy on chemotherapy Inlyta (follows with Dr. Voss at [facility]) presented to the ED  for evaluation of bilateral lower extremity blistering and rash. Pt states rash started >2 weeks ago on bilateral thighs and anterior lower legs, was recently admitted to [facility] for GIB/blood in stool. She underwent biopsy with Burn, results of which are still pending. Dermatology recommended workup the results of which are still pending. She was empirically treated with IV clindamycin (allergic to penicillin) and is being discharged on oral clindamycin to complete a 7 day course.   Patient is medically stable and ready for discharge.    #Petechial Rash : Ddx: Infectious Vs Vasculitis Vs Stasis Dermatitis Vs Idiopathic  -Dermatology consult appreciated  -Hep B, Hep C serology negative  -f/U PRIMO   -anti-ds DNA negative  -C3, C4 negative   -f/u Cryoglobulin pending  - atypical ANCA indeterminate  -will treat with Empiric Abs(Clinda as the patient is allergic to penicillin) due to open wound on right posterior leg  -s/p skin bx 3/13 pending results  - Local wound care for now: bacitracin/xeroform/kerlix/ACE bandage BID  - wcx taken 3/13 negative  - Derm recommending ID follow up > ID agrees with oral clindamycin    #H/o renal cell CA   - s/p L nephrectomy  -continue with Inlyta 5mg 2tab OD    # H/O DVT  - c/w Eliquis     # H/O Hypothyroidism   - c/w synthroid           A 68Y/F with PMHx of Hypothyroidism, DVT (on eliquis), metastatic renal cell carcinoma s/p L nephrectomy, distal pancreatectomy and splenectomy on chemotherapy Inlyta (follows with Dr. Voss at [facility]) presented to the ED  for evaluation of bilateral lower extremity blistering and rash. Pt states rash started >2 weeks ago on bilateral thighs and anterior lower legs, was recently admitted to [facility] for GIB/blood in stool. She underwent biopsy with Burn, results of which are still pending. Dermatology recommended workup the results of which are still pending. She was empirically treated with IV clindamycin (allergic to penicillin) and is being discharged on oral clindamycin to complete a 7 day course.   Patient is medically stable and ready for discharge.    # Lower ext wounds with  blisters/rash with surrounding cellulitis  - wound Culture Results: No growth to date. (03.13.24 @ 11:20)  - Local wound care for now: bacitracin/xeroform/kerlix/ACE bandage BID  - evaluated by Burns team S/p biopsy  - Dermatology eval: Stasis skin changes following lower legs edema now with tender edema blister remnants and scaly patches with tender skin fissures/ partial thickness wound and purpuric skin lesions: possible contributions from  impetiginization/ infection vs Vasculitis vs others (medications like calcium channel blockers, idiopathic, etc) can't be ruled out at this time:  - Hepatitis profile neg  - HIV-1/2 Combo Result: Nonreact: (03.13.24 @ 05:39)  -f/U PRIMO   -anti-ds DNA negative  -C3, C4 negative   -f/u Cryoglobulin pending  - atypical ANCA indeterminate  -will treat with Empiric Abs(Clinda as the patient is allergic to penicillin) due to open wound on right posterior leg  -s/p skin bx 3/13 pending results  - Derm recommending ID follow up > ID agrees with oral clindamycin      # Hypertension  - c/w norvasc    # H/o DVT  - c/w eliquis    # Hypothyroidism  - c/w synthroid    # H/o metastatic renal cell cancer s/p left  nephrectomy, distal pancreatectomy and splenectomy  - on Inlyta               Yes

## 2024-03-14 NOTE — CHART NOTE - NSCHARTNOTEFT_GEN_A_CORE
Patient requires rolling walker at home due to Gait dysfunction due to bilateral lower extremity edema, blisters on bilateral lower extremities for safe ambulation at discharge. Patient requires rolling walker at home due to their diagnosis of Gait dysfunction due to bilateral lower extremity edema, blisters on bilateral lower extremities , to help complete mobility-related  activities of daily living (MRADL) .

## 2024-03-14 NOTE — PROGRESS NOTE ADULT - ASSESSMENT
A 68Y/F with PMHx of Hypothyroidism, DVT (on eliquis), metastatic renal cell carcinoma s/p L nephrectomy, distal pancreatectomy and splenectomy on chemotherapy Inlyta (follows with Dr. Voss at [facility]) presented to the ED  for evaluation of bilateral lower extremity blistering and rash. Pt states rash started >2 weeks ago on bilateral thighs and anterior lower legs, was recently admitted to [facility] for GIB/blood in stool. At the time was treated with vitamin A&D ointment and advised to follow up with outpatient dermatology. Since discharge, rash on the thighs has improved , however leg areas developed small serous filled blisters 3 days ago.      #Petechial Rash : Ddx: Infectious Vs Vasculitis Vs Stasis Dermatitis Vs Idiopathic  -Local wound care  -Dermatology consult  -Hep B, Hep C serology negative  -f/U PRIMO, anti-ds DNA, anti-Johnson Ab, anti-Josefina, anto-La Ab pending  -F/U C3, C4, Cryoglobulin pending  - atypical ANCA indeterminate  -F/U RF (negative), ESR , CRP  -will treat with Empiric Abs(Clinda as the patient is allergic to penicillin) due to open wound on right posterior leg  -s/p skin bx 3/13  - Local wound care for now: bacitracin/xeroform/kerlix/ACE bandage BID  - wcx taken 3/13, f/u    #H/o renal cell CA   - s/p  L nephrectomy  -continue with Inlyta 5mg 2tab OD    # H/O DVT  - c/w Eliquis     # H/O Hypothyroidism   - c/w synthroid     #MISC:  -DVT ppx: Eliquis  -GI ppx: PPI  -Diet: DASH  -Activity: IAT  -Code status: Full code

## 2024-03-14 NOTE — PROGRESS NOTE ADULT - SUBJECTIVE AND OBJECTIVE BOX
Patient is a 68y old  Female who presents with a chief complaint of LE rash (13 Mar 2024 11:56)    Patient was seen and examined.  C/o lower ext pain    PAST MEDICAL & SURGICAL HISTORY:  Renal cell carcinoma  Hypertension  Breast cancer  Deep vein thrombosis (DVT)  H/O left nephrectomy  H/O partial adrenalectomy  History of partial pancreatectomy  S/P splenectomy  H/O lumpectomy    Allergies  penicillins (Angioedema)  fish (Unknown)    MEDICATIONS  (STANDING):  amLODIPine   Tablet 5 milliGRAM(s) Oral daily  apixaban 2.5 milliGRAM(s) Oral every 12 hours  axitinib 5 milliGRAM(s) Oral every 12 hours  bacitracin   Ointment 1 Application(s) Topical two times a day  calcium carbonate 1250 mG  + Vitamin D (OsCal 500 + D) 1 Tablet(s) Oral daily  clindamycin IVPB 600 milliGRAM(s) IV Intermittent every 8 hours  clindamycin IVPB      ibuprofen  Tablet. 600 milliGRAM(s) Oral every 8 hours  levothyroxine 25 MICROGram(s) Oral daily  lidocaine 1%/epinephrine 1:100,000 Inj 1 Vial(s) Local Injection once  mupirocin 2% Ointment 1 Application(s) Topical two times a day  pantoprazole    Tablet 40 milliGRAM(s) Oral before breakfast    MEDICATIONS  (PRN):  acetaminophen     Tablet .. 650 milliGRAM(s) Oral every 6 hours PRN Temp greater or equal to 38C (100.4F), Mild Pain (1 - 3)  aluminum hydroxide/magnesium hydroxide/simethicone Suspension 30 milliLiter(s) Oral every 4 hours PRN Dyspepsia  melatonin 3 milliGRAM(s) Oral at bedtime PRN Insomnia  ondansetron Injectable 4 milliGRAM(s) IV Push every 8 hours PRN Nausea and/or Vomiting      T(C): 36.7 (03-14-24 @ 07:55), Max: 36.7 (03-14-24 @ 07:55)  HR: 68 (03-14-24 @ 07:55) (68 - 89)  BP: 120/69 (03-14-24 @ 07:55) (118/71 - 126/78)  RR: 18 (03-14-24 @ 07:55) (18 - 18)  SpO2: --    O/E:  Awake, alert, not in distress.  HEENT: atraumatic, EOMI.  Chest: clear.  CVS: SIS2 +, no murmur.  P/A: Soft, BS+  CNS: awake, alert  Ext: lower ext wounds with blisters, rash  All systems reviewed positive findings as above.                            9.5<L>  6.98  )-----------( 247      ( 14 Mar 2024 08:23 )             29.0<L>                        10.3<L>  8.17  )-----------( 239      ( 13 Mar 2024 05:39 )             31.4<L>  03-14    137  |  104  |  19  ----------------------------<  77  4.0   |  21  |  1.2  03-13    136  |  104  |  22<H>  ----------------------------<  78  3.9   |  20  |  1.0    Ca    7.6<L>      14 Mar 2024 08:23  Ca    8.1<L>      13 Mar 2024 05:39  Ca    8.8      12 Mar 2024 14:41  Mg     2.1     03-14    TPro  6.3  /  Alb  3.0<L>  /  TBili  0.2  /  DBili  x   /  AST  21  /  ALT  12  /  AlkPhos  83  03-14  TPro  6.3  /  Alb  2.9<L>  /  TBili  0.3  /  DBili  x   /  AST  20  /  ALT  12  /  AlkPhos  78  03-13  TPro  7.1  /  Alb  3.2<L>  /  TBili  0.3  /  DBili  x   /  AST  25  /  ALT  13  /  AlkPhos  91  03-12

## 2024-03-14 NOTE — DISCHARGE NOTE PROVIDER - PROVIDER TOKENS
PROVIDER:[TOKEN:[[id number]],FOLLOWUP:[1 week]],PROVIDER:[TOKEN:[[id number]],FOLLOWUP:[1 week]]

## 2024-03-14 NOTE — PROGRESS NOTE ADULT - SUBJECTIVE AND OBJECTIVE BOX
24H events:    Patient is a 68y old Female who presents with a chief complaint of LE rash (14 Mar 2024 09:32)    Primary diagnosis of Rash    Today is hospital day 2d. This morning patient was seen and examined at bedside, resting comfortably in bed.    No acute or major events overnight.    Code Status: Full code    PAST MEDICAL & SURGICAL HISTORY  Renal cell carcinoma    Hypertension    Breast cancer    Deep vein thrombosis (DVT)    H/O left nephrectomy    H/O partial adrenalectomy    History of partial pancreatectomy    S/P splenectomy    H/O lumpectomy      SOCIAL HISTORY:  Social History:      ALLERGIES:  penicillins (Angioedema)  fish (Unknown)    MEDICATIONS:  STANDING MEDICATIONS  amLODIPine   Tablet 5 milliGRAM(s) Oral daily  apixaban 2.5 milliGRAM(s) Oral every 12 hours  axitinib 5 milliGRAM(s) Oral every 12 hours  bacitracin   Ointment 1 Application(s) Topical two times a day  calcium carbonate 1250 mG  + Vitamin D (OsCal 500 + D) 1 Tablet(s) Oral daily  clindamycin IVPB 600 milliGRAM(s) IV Intermittent every 8 hours  clindamycin IVPB      ibuprofen  Tablet. 600 milliGRAM(s) Oral every 8 hours  levothyroxine 25 MICROGram(s) Oral daily  lidocaine 1%/epinephrine 1:100,000 Inj 1 Vial(s) Local Injection once  mupirocin 2% Ointment 1 Application(s) Topical two times a day  pantoprazole    Tablet 40 milliGRAM(s) Oral before breakfast    PRN MEDICATIONS  acetaminophen     Tablet .. 650 milliGRAM(s) Oral every 6 hours PRN  aluminum hydroxide/magnesium hydroxide/simethicone Suspension 30 milliLiter(s) Oral every 4 hours PRN  melatonin 3 milliGRAM(s) Oral at bedtime PRN  ondansetron Injectable 4 milliGRAM(s) IV Push every 8 hours PRN    VITALS:   T(F): 98.1  HR: 68  BP: 120/69  RR: 18  SpO2: --    PHYSICAL EXAM:   GENERAL: NAD, lying in bed comfortably  HEAD:  Atraumatic, Normocephalic  EYES: EOMI, sclera clear  CHEST/LUNG: Clear to auscultation anteriorly bilaterally  HEART: Regular rate and rhythm; No appreciable murmurs, rubs, or gallops  ABDOMEN: Soft, nontender, nondistended  EXTREMITIES:  No clubbing, cyanosis, or edema  NERVOUS SYSTEM:  A&Ox3, no noted facial droop.   SKIN: stasis dermatitis over bilateral LE, ace wrap around RLE    (  ) Indwelling George Catheter:   Date insterted:    Reason (  ) Critical illness     (  ) urinary retention    (  ) Accurate Ins/Outs Monitoring     (  ) CMO patient    (  ) Central Line:   Date inserted:  Location: (  ) Right IJ     (  ) Left IJ     (  ) Right Fem     (  ) Left Fem    (  ) SPC        (  ) pigtail       (  ) PEG tube       (  ) colostomy       (  ) jejunostomy  (  ) U-Dall    LABS:                        9.5    6.98  )-----------( 247      ( 14 Mar 2024 08:23 )             29.0     03-14    137  |  104  |  19  ----------------------------<  77  4.0   |  21  |  1.2    Ca    7.6<L>      14 Mar 2024 08:23  Mg     2.1     03-14    TPro  6.3  /  Alb  3.0<L>  /  TBili  0.2  /  DBili  x   /  AST  21  /  ALT  12  /  AlkPhos  83  03-14      Urinalysis Basic - ( 14 Mar 2024 08:23 )    Color: x / Appearance: x / SG: x / pH: x  Gluc: 77 mg/dL / Ketone: x  / Bili: x / Urobili: x   Blood: x / Protein: x / Nitrite: x   Leuk Esterase: x / RBC: x / WBC x   Sq Epi: x / Non Sq Epi: x / Bacteria: x                RADIOLOGY:

## 2024-03-15 LAB
ALBUMIN SERPL ELPH-MCNC: 2.9 G/DL — LOW (ref 3.5–5.2)
ALP SERPL-CCNC: 84 U/L — SIGNIFICANT CHANGE UP (ref 30–115)
ALT FLD-CCNC: 12 U/L — SIGNIFICANT CHANGE UP (ref 0–41)
ANION GAP SERPL CALC-SCNC: 13 MMOL/L — SIGNIFICANT CHANGE UP (ref 7–14)
AST SERPL-CCNC: 22 U/L — SIGNIFICANT CHANGE UP (ref 0–41)
BASOPHILS # BLD AUTO: 0.06 K/UL — SIGNIFICANT CHANGE UP (ref 0–0.2)
BASOPHILS NFR BLD AUTO: 0.9 % — SIGNIFICANT CHANGE UP (ref 0–1)
BILIRUB SERPL-MCNC: 0.2 MG/DL — SIGNIFICANT CHANGE UP (ref 0.2–1.2)
BUN SERPL-MCNC: 21 MG/DL — HIGH (ref 10–20)
CALCIUM SERPL-MCNC: 7.4 MG/DL — LOW (ref 8.4–10.5)
CHLORIDE SERPL-SCNC: 102 MMOL/L — SIGNIFICANT CHANGE UP (ref 98–110)
CO2 SERPL-SCNC: 20 MMOL/L — SIGNIFICANT CHANGE UP (ref 17–32)
CREAT SERPL-MCNC: 1.2 MG/DL — SIGNIFICANT CHANGE UP (ref 0.7–1.5)
CULTURE RESULTS: SIGNIFICANT CHANGE UP
EGFR: 50 ML/MIN/1.73M2 — LOW
EOSINOPHIL # BLD AUTO: 0 K/UL — SIGNIFICANT CHANGE UP (ref 0–0.7)
EOSINOPHIL NFR BLD AUTO: 0 % — SIGNIFICANT CHANGE UP (ref 0–8)
GLUCOSE SERPL-MCNC: 77 MG/DL — SIGNIFICANT CHANGE UP (ref 70–99)
HCT VFR BLD CALC: 30 % — LOW (ref 37–47)
HGB BLD-MCNC: 9.8 G/DL — LOW (ref 12–16)
LYMPHOCYTES # BLD AUTO: 0.91 K/UL — LOW (ref 1.2–3.4)
LYMPHOCYTES # BLD AUTO: 14.8 % — LOW (ref 20.5–51.1)
MAGNESIUM SERPL-MCNC: 2 MG/DL — SIGNIFICANT CHANGE UP (ref 1.8–2.4)
MCHC RBC-ENTMCNC: 32 PG — HIGH (ref 27–31)
MCHC RBC-ENTMCNC: 32.7 G/DL — SIGNIFICANT CHANGE UP (ref 32–37)
MCV RBC AUTO: 98 FL — SIGNIFICANT CHANGE UP (ref 81–99)
MONOCYTES # BLD AUTO: 0.86 K/UL — HIGH (ref 0.1–0.6)
MONOCYTES NFR BLD AUTO: 13.9 % — HIGH (ref 1.7–9.3)
NEUTROPHILS # BLD AUTO: 4.29 K/UL — SIGNIFICANT CHANGE UP (ref 1.4–6.5)
NEUTROPHILS NFR BLD AUTO: 69.5 % — SIGNIFICANT CHANGE UP (ref 42.2–75.2)
PLATELET # BLD AUTO: 238 K/UL — SIGNIFICANT CHANGE UP (ref 130–400)
PMV BLD: 10.1 FL — SIGNIFICANT CHANGE UP (ref 7.4–10.4)
POTASSIUM SERPL-MCNC: 4.3 MMOL/L — SIGNIFICANT CHANGE UP (ref 3.5–5)
POTASSIUM SERPL-SCNC: 4.3 MMOL/L — SIGNIFICANT CHANGE UP (ref 3.5–5)
PROT SERPL-MCNC: 6.4 G/DL — SIGNIFICANT CHANGE UP (ref 6–8)
RBC # BLD: 3.06 M/UL — LOW (ref 4.2–5.4)
RBC # FLD: 17.7 % — HIGH (ref 11.5–14.5)
SODIUM SERPL-SCNC: 135 MMOL/L — SIGNIFICANT CHANGE UP (ref 135–146)
SPECIMEN SOURCE: SIGNIFICANT CHANGE UP
WBC # BLD: 6.17 K/UL — SIGNIFICANT CHANGE UP (ref 4.8–10.8)
WBC # FLD AUTO: 6.17 K/UL — SIGNIFICANT CHANGE UP (ref 4.8–10.8)

## 2024-03-15 PROCEDURE — 99232 SBSQ HOSP IP/OBS MODERATE 35: CPT

## 2024-03-15 RX ORDER — TRAMADOL HYDROCHLORIDE 50 MG/1
50 TABLET ORAL EVERY 8 HOURS
Refills: 0 | Status: DISCONTINUED | OUTPATIENT
Start: 2024-03-15 | End: 2024-03-16

## 2024-03-15 RX ORDER — ACETAMINOPHEN 500 MG
975 TABLET ORAL EVERY 8 HOURS
Refills: 0 | Status: DISCONTINUED | OUTPATIENT
Start: 2024-03-15 | End: 2024-03-16

## 2024-03-15 RX ADMIN — Medication 975 MILLIGRAM(S): at 21:08

## 2024-03-15 RX ADMIN — Medication 600 MILLIGRAM(S): at 06:40

## 2024-03-15 RX ADMIN — Medication 1 APPLICATION(S): at 05:46

## 2024-03-15 RX ADMIN — MUPIROCIN 1 APPLICATION(S): 20 OINTMENT TOPICAL at 05:46

## 2024-03-15 RX ADMIN — Medication 100 MILLIGRAM(S): at 21:08

## 2024-03-15 RX ADMIN — Medication 975 MILLIGRAM(S): at 15:48

## 2024-03-15 RX ADMIN — TRAMADOL HYDROCHLORIDE 50 MILLIGRAM(S): 50 TABLET ORAL at 13:53

## 2024-03-15 RX ADMIN — Medication 600 MILLIGRAM(S): at 05:46

## 2024-03-15 RX ADMIN — Medication 1 TABLET(S): at 11:48

## 2024-03-15 RX ADMIN — MUPIROCIN 1 APPLICATION(S): 20 OINTMENT TOPICAL at 17:27

## 2024-03-15 RX ADMIN — Medication 975 MILLIGRAM(S): at 22:05

## 2024-03-15 RX ADMIN — Medication 1 APPLICATION(S): at 17:24

## 2024-03-15 RX ADMIN — PANTOPRAZOLE SODIUM 40 MILLIGRAM(S): 20 TABLET, DELAYED RELEASE ORAL at 05:45

## 2024-03-15 RX ADMIN — AMLODIPINE BESYLATE 5 MILLIGRAM(S): 2.5 TABLET ORAL at 05:47

## 2024-03-15 RX ADMIN — TRAMADOL HYDROCHLORIDE 50 MILLIGRAM(S): 50 TABLET ORAL at 12:44

## 2024-03-15 RX ADMIN — Medication 100 MILLIGRAM(S): at 05:45

## 2024-03-15 RX ADMIN — APIXABAN 2.5 MILLIGRAM(S): 2.5 TABLET, FILM COATED ORAL at 05:46

## 2024-03-15 RX ADMIN — Medication 100 MILLIGRAM(S): at 14:21

## 2024-03-15 RX ADMIN — Medication 600 MILLIGRAM(S): at 21:08

## 2024-03-15 RX ADMIN — Medication 975 MILLIGRAM(S): at 14:19

## 2024-03-15 RX ADMIN — APIXABAN 2.5 MILLIGRAM(S): 2.5 TABLET, FILM COATED ORAL at 17:25

## 2024-03-15 RX ADMIN — Medication 25 MICROGRAM(S): at 05:46

## 2024-03-15 RX ADMIN — Medication 600 MILLIGRAM(S): at 22:05

## 2024-03-15 NOTE — PROGRESS NOTE ADULT - ASSESSMENT
68Y/F with PMHx of Hypothyroidism, DVT (on eliquis), metastatic renal cell carcinoma s/p L nephrectomy, distal pancreatectomy and splenectomy on chemotherapy Inlyta (follows with Dr. Voss at [facility]) presented to the ED  for evaluation of bilateral lower extremity blistering and rash  #Labs: Hb 11(at baseline)    # Lower ext wounds with  blisters/rash with surrounding cellulitis  - c/w clindamycin  - wound Culture Results: No growth to date. (03.13.24 @ 11:20)  - Local wound care for now: bacitracin/xeroform/kerlix/ACE bandage BID  - evaluated by Burns team S/p biopsy  - Dermatology eval: Stasis skin changes following lower legs edema now with tender edema blister remnants and scaly patches with tender skin fissures/ partial thickness wound and purpuric skin lesions: possible contributions from  impetiginization/ infection vs Vasculitis vs others (medications like calcium channel blockers, idiopathic, etc) can't be ruled out at this time:  - Hepatitis profile neg  - HIV-1/2 Combo Result: Nonreact: (03.13.24 @ 05:39  - Start  tylenol, ultram  - Dermatology and Burn F/u    # Hypertension  - c/w norvasc    # H/o DVT  - c/w eliquis    # Hypothyroidism  - c/w synthroid    # H/o metastatic renal cell cancer s/p left  nephrectomy, distal pancreatectomy and splenectomy  - on Inlyta     #  Full code    # Pending:  Dermatology f/u, PT f/u  # Disposition: Home when stable

## 2024-03-15 NOTE — PROGRESS NOTE ADULT - ASSESSMENT
A 68Y/F with PMHx of Hypothyroidism, DVT (on eliquis), metastatic renal cell carcinoma s/p L nephrectomy, distal pancreatectomy and splenectomy on chemotherapy Inlyta (follows with Dr. Voss at [facility]) presented to the ED  for evaluation of bilateral lower extremity blistering and rash. Pt states rash started >2 weeks ago on bilateral thighs and anterior lower legs, was recently admitted to [facility] for GIB/blood in stool. At the time was treated with vitamin A&D ointment and advised to follow up with outpatient dermatology. Since discharge, rash on the thighs has improved , however leg areas developed small serous filled blisters 3 days ago.      #Petechial Rash : Ddx: Infectious Vs Vasculitis Vs Stasis Dermatitis Vs Idiopathic  -Local wound care  -Dermatology consult  -Hep B, Hep C serology negative  -f/U PRIMO, anti-ds DNA, anti-Johnson Ab, anti-Josefina, anto-La Ab pending  -F/U C3, C4, Cryoglobulin pending  - atypical ANCA indeterminate  -F/U RF (negative), ESR , CRP  -will treat with Empiric Abs(Clinda as the patient is allergic to penicillin) due to open wound on right posterior leg  -s/p skin bx 3/13  - Local wound care for now: bacitracin/xeroform/kerlix/ACE bandage BID  - wcx taken 3/13, f/u    #H/o renal cell CA   - s/p  L nephrectomy  -continue with Inlyta 5mg 2tab OD    # H/O DVT  - c/w Eliquis     # H/O Hypothyroidism   - c/w synthroid     #MISC:  -DVT ppx: Eliquis  -GI ppx: PPI  -Diet: DASH  -Activity: IAT  -Code status: Full code

## 2024-03-15 NOTE — PROGRESS NOTE ADULT - SUBJECTIVE AND OBJECTIVE BOX
Patient is a 68y old  Female who presents with a chief complaint of LE rash (13 Mar 2024 11:56)    Patient was seen and examined.  C/o lower ext pain with worsening rash    PAST MEDICAL & SURGICAL HISTORY:  Renal cell carcinoma  Hypertension  Breast cancer  Deep vein thrombosis (DVT)  H/O left nephrectomy  H/O partial adrenalectomy  History of partial pancreatectomy  S/P splenectomy  H/O lumpectomy    Allergies  penicillins (Angioedema)  fish (Unknown)    MEDICATIONS  (STANDING):  acetaminophen     Tablet .. 975 milliGRAM(s) Oral every 8 hours  amLODIPine   Tablet 5 milliGRAM(s) Oral daily  apixaban 2.5 milliGRAM(s) Oral every 12 hours  axitinib 5 milliGRAM(s) Oral every 12 hours  bacitracin   Ointment 1 Application(s) Topical two times a day  calcium carbonate 1250 mG  + Vitamin D (OsCal 500 + D) 1 Tablet(s) Oral daily  clindamycin IVPB 600 milliGRAM(s) IV Intermittent every 8 hours  clindamycin IVPB      ibuprofen  Tablet. 600 milliGRAM(s) Oral every 8 hours  levothyroxine 25 MICROGram(s) Oral daily  lidocaine 1%/epinephrine 1:100,000 Inj 1 Vial(s) Local Injection once  mupirocin 2% Ointment 1 Application(s) Topical two times a day  pantoprazole    Tablet 40 milliGRAM(s) Oral before breakfast    MEDICATIONS  (PRN):  aluminum hydroxide/magnesium hydroxide/simethicone Suspension 30 milliLiter(s) Oral every 4 hours PRN Dyspepsia  melatonin 3 milliGRAM(s) Oral at bedtime PRN Insomnia  ondansetron Injectable 4 milliGRAM(s) IV Push every 8 hours PRN Nausea and/or Vomiting  traMADol 50 milliGRAM(s) Oral every 8 hours PRN Severe Pain (7 - 10)    T(C): 36.2 (03-15-24 @ 07:59), Max: 36.3 (03-14-24 @ 15:00)  HR: 61 (03-15-24 @ 07:59) (61 - 62)  BP: 117/66 (03-15-24 @ 07:59) (117/66 - 118/64)  RR: 18 (03-15-24 @ 07:59) (18 - 18)  SpO2: --    O/E:  Awake, alert, not in distress.  HEENT: atraumatic, EOMI.  Chest: clear.  CVS: SIS2 +, no murmur.  P/A: Soft, BS+  CNS: awake, alert  Ext: lower ext wounds with blisters, rash  All systems reviewed positive findings as above.                          9.8<L>  6.17  )-----------( 238      ( 15 Mar 2024 07:28 )             30.0<L>                        9.5<L>  6.98  )-----------( 247      ( 14 Mar 2024 08:23 )             29.0<L>  03-15    135  |  102  |  21<H>  ----------------------------<  77  4.3   |  20  |  1.2  03-14    137  |  104  |  19  ----------------------------<  77  4.0   |  21  |  1.2    Ca    7.4<L>      15 Mar 2024 07:28  Ca    7.6<L>      14 Mar 2024 08:23  Mg     2.0     03-15    TPro  6.4  /  Alb  2.9<L>  /  TBili  0.2  /  DBili  x   /  AST  22  /  ALT  12  /  AlkPhos  84  03-15  TPro  6.3  /  Alb  3.0<L>  /  TBili  0.2  /  DBili  x   /  AST  21  /  ALT  12  /  AlkPhos  83  03-14

## 2024-03-15 NOTE — PROGRESS NOTE ADULT - TIME BILLING
Direct patient care. Discussed on rounds with Housestaff

## 2024-03-15 NOTE — PROGRESS NOTE ADULT - SUBJECTIVE AND OBJECTIVE BOX
24H events:    Patient is a 68y old Female who presents with a chief complaint of LE rash (15 Mar 2024 14:14)    Primary diagnosis of Rash      Today is hospital day 4d. This morning patient was seen and examined at bedside, resting comfortably in bed.    No acute or major events overnight.    Code Status: Full code    PAST MEDICAL & SURGICAL HISTORY  Renal cell carcinoma    Hypertension    Breast cancer    Deep vein thrombosis (DVT)    H/O left nephrectomy    H/O partial adrenalectomy    History of partial pancreatectomy    S/P splenectomy    H/O lumpectomy      SOCIAL HISTORY:  Social History:      ALLERGIES:  penicillins (Angioedema)  fish (Unknown)    MEDICATIONS:  STANDING MEDICATIONS  acetaminophen     Tablet .. 975 milliGRAM(s) Oral every 8 hours  amLODIPine   Tablet 5 milliGRAM(s) Oral daily  apixaban 2.5 milliGRAM(s) Oral every 12 hours  axitinib 5 milliGRAM(s) Oral every 12 hours  bacitracin   Ointment 1 Application(s) Topical two times a day  calcium carbonate 1250 mG  + Vitamin D (OsCal 500 + D) 1 Tablet(s) Oral daily  clindamycin IVPB 600 milliGRAM(s) IV Intermittent every 8 hours  clindamycin IVPB      ibuprofen  Tablet. 600 milliGRAM(s) Oral every 8 hours  levothyroxine 25 MICROGram(s) Oral daily  lidocaine 1%/epinephrine 1:100,000 Inj 1 Vial(s) Local Injection once  mupirocin 2% Ointment 1 Application(s) Topical two times a day  pantoprazole    Tablet 40 milliGRAM(s) Oral before breakfast    PRN MEDICATIONS  aluminum hydroxide/magnesium hydroxide/simethicone Suspension 30 milliLiter(s) Oral every 4 hours PRN  melatonin 3 milliGRAM(s) Oral at bedtime PRN  ondansetron Injectable 4 milliGRAM(s) IV Push every 8 hours PRN  traMADol 50 milliGRAM(s) Oral every 8 hours PRN    VITALS:   T(F): 97.2  HR: 60  BP: 122/63  RR: 18  SpO2: --    PHYSICAL EXAM:   GENERAL: NAD, lying in bed comfortably  HEAD:  Atraumatic, Normocephalic  EYES: EOMI, sclera clear  ENT: Moist mucous membranes  NECK: Supple, trachea midline  CHEST/LUNG: Clear to auscultation anteriorly bilaterally; No rales, rhonchi, wheezing, or rubs. Unlabored respirations  HEART: Regular rate and rhythm; No appreciable murmurs, rubs, or gallops  ABDOMEN: (+)BS; Soft, nontender, nondistended  EXTREMITIES:  2+ Radial Pulses, brisk capillary refill. No clubbing, cyanosis, or edema  NERVOUS SYSTEM:  A&Ox3, no noted facial droop. Moving all extremities w/o difficulty.   SKIN: No rashes or lesions to b/l forearm, face.     (  ) Indwelling George Catheter:   Date insterted:    Reason (  ) Critical illness     (  ) urinary retention    (  ) Accurate Ins/Outs Monitoring     (  ) CMO patient    (  ) Central Line:   Date inserted:  Location: (  ) Right IJ     (  ) Left IJ     (  ) Right Fem     (  ) Left Fem    (  ) SPC        (  ) pigtail       (  ) PEG tube       (  ) colostomy       (  ) jejunostomy  (  ) U-Dall    LABS:                        9.8    6.17  )-----------( 238      ( 15 Mar 2024 07:28 )             30.0     03-15    135  |  102  |  21<H>  ----------------------------<  77  4.3   |  20  |  1.2    Ca    7.4<L>      15 Mar 2024 07:28  Mg     2.0     03-15    TPro  6.4  /  Alb  2.9<L>  /  TBili  0.2  /  DBili  x   /  AST  22  /  ALT  12  /  AlkPhos  84  03-15      Urinalysis Basic - ( 15 Mar 2024 07:28 )    Color: x / Appearance: x / SG: x / pH: x  Gluc: 77 mg/dL / Ketone: x  / Bili: x / Urobili: x   Blood: x / Protein: x / Nitrite: x   Leuk Esterase: x / RBC: x / WBC x   Sq Epi: x / Non Sq Epi: x / Bacteria: x            Culture - Other (collected 13 Mar 2024 11:20)  Source: Wound Wound  Final Report (15 Mar 2024 13:58):    No growth to date.

## 2024-03-16 VITALS
HEART RATE: 55 BPM | SYSTOLIC BLOOD PRESSURE: 119 MMHG | TEMPERATURE: 98 F | DIASTOLIC BLOOD PRESSURE: 70 MMHG | RESPIRATION RATE: 18 BRPM

## 2024-03-16 LAB
ALBUMIN SERPL ELPH-MCNC: 2.7 G/DL — LOW (ref 3.5–5.2)
ALP SERPL-CCNC: 78 U/L — SIGNIFICANT CHANGE UP (ref 30–115)
ALT FLD-CCNC: 12 U/L — SIGNIFICANT CHANGE UP (ref 0–41)
ANION GAP SERPL CALC-SCNC: 10 MMOL/L — SIGNIFICANT CHANGE UP (ref 7–14)
AST SERPL-CCNC: 21 U/L — SIGNIFICANT CHANGE UP (ref 0–41)
BASOPHILS # BLD AUTO: 0.02 K/UL — SIGNIFICANT CHANGE UP (ref 0–0.2)
BASOPHILS NFR BLD AUTO: 0.4 % — SIGNIFICANT CHANGE UP (ref 0–1)
BILIRUB SERPL-MCNC: 0.2 MG/DL — SIGNIFICANT CHANGE UP (ref 0.2–1.2)
BUN SERPL-MCNC: 20 MG/DL — SIGNIFICANT CHANGE UP (ref 10–20)
CALCIUM SERPL-MCNC: 7 MG/DL — LOW (ref 8.4–10.5)
CHLORIDE SERPL-SCNC: 105 MMOL/L — SIGNIFICANT CHANGE UP (ref 98–110)
CO2 SERPL-SCNC: 18 MMOL/L — SIGNIFICANT CHANGE UP (ref 17–32)
CREAT SERPL-MCNC: 1 MG/DL — SIGNIFICANT CHANGE UP (ref 0.7–1.5)
EGFR: 62 ML/MIN/1.73M2 — SIGNIFICANT CHANGE UP
EOSINOPHIL # BLD AUTO: 0.04 K/UL — SIGNIFICANT CHANGE UP (ref 0–0.7)
EOSINOPHIL NFR BLD AUTO: 0.8 % — SIGNIFICANT CHANGE UP (ref 0–8)
GLUCOSE SERPL-MCNC: 67 MG/DL — LOW (ref 70–99)
HCT VFR BLD CALC: 29.8 % — LOW (ref 37–47)
HGB BLD-MCNC: 9.7 G/DL — LOW (ref 12–16)
IMM GRANULOCYTES NFR BLD AUTO: 0.2 % — SIGNIFICANT CHANGE UP (ref 0.1–0.3)
LYMPHOCYTES # BLD AUTO: 1.27 K/UL — SIGNIFICANT CHANGE UP (ref 1.2–3.4)
LYMPHOCYTES # BLD AUTO: 23.9 % — SIGNIFICANT CHANGE UP (ref 20.5–51.1)
MAGNESIUM SERPL-MCNC: 1.9 MG/DL — SIGNIFICANT CHANGE UP (ref 1.8–2.4)
MCHC RBC-ENTMCNC: 32.3 PG — HIGH (ref 27–31)
MCHC RBC-ENTMCNC: 32.6 G/DL — SIGNIFICANT CHANGE UP (ref 32–37)
MCV RBC AUTO: 99.3 FL — HIGH (ref 81–99)
MONOCYTES # BLD AUTO: 1.11 K/UL — HIGH (ref 0.1–0.6)
MONOCYTES NFR BLD AUTO: 20.9 % — HIGH (ref 1.7–9.3)
NEUTROPHILS # BLD AUTO: 2.86 K/UL — SIGNIFICANT CHANGE UP (ref 1.4–6.5)
NEUTROPHILS NFR BLD AUTO: 53.8 % — SIGNIFICANT CHANGE UP (ref 42.2–75.2)
NRBC # BLD: 0 /100 WBCS — SIGNIFICANT CHANGE UP (ref 0–0)
PLATELET # BLD AUTO: 243 K/UL — SIGNIFICANT CHANGE UP (ref 130–400)
PMV BLD: 10.3 FL — SIGNIFICANT CHANGE UP (ref 7.4–10.4)
POTASSIUM SERPL-MCNC: 4.3 MMOL/L — SIGNIFICANT CHANGE UP (ref 3.5–5)
POTASSIUM SERPL-SCNC: 4.3 MMOL/L — SIGNIFICANT CHANGE UP (ref 3.5–5)
PROT SERPL-MCNC: 6.3 G/DL — SIGNIFICANT CHANGE UP (ref 6–8)
RBC # BLD: 3 M/UL — LOW (ref 4.2–5.4)
RBC # FLD: 17.9 % — HIGH (ref 11.5–14.5)
SODIUM SERPL-SCNC: 133 MMOL/L — LOW (ref 135–146)
WBC # BLD: 5.31 K/UL — SIGNIFICANT CHANGE UP (ref 4.8–10.8)
WBC # FLD AUTO: 5.31 K/UL — SIGNIFICANT CHANGE UP (ref 4.8–10.8)

## 2024-03-16 PROCEDURE — 99239 HOSP IP/OBS DSCHRG MGMT >30: CPT

## 2024-03-16 RX ORDER — AMLODIPINE BESYLATE 2.5 MG/1
0 TABLET ORAL
Qty: 0 | Refills: 0 | DISCHARGE

## 2024-03-16 RX ORDER — ACETAMINOPHEN 500 MG
3 TABLET ORAL
Qty: 135 | Refills: 0
Start: 2024-03-16 | End: 2024-03-30

## 2024-03-16 RX ORDER — BACITRACIN ZINC 500 UNIT/G
1 OINTMENT IN PACKET (EA) TOPICAL
Qty: 1 | Refills: 0
Start: 2024-03-16

## 2024-03-16 RX ORDER — AMLODIPINE BESYLATE 2.5 MG/1
1 TABLET ORAL
Qty: 0 | Refills: 0 | DISCHARGE
Start: 2024-03-16

## 2024-03-16 RX ORDER — TRAMADOL HYDROCHLORIDE 50 MG/1
1 TABLET ORAL
Qty: 15 | Refills: 0
Start: 2024-03-16 | End: 2024-03-20

## 2024-03-16 RX ADMIN — APIXABAN 2.5 MILLIGRAM(S): 2.5 TABLET, FILM COATED ORAL at 05:28

## 2024-03-16 RX ADMIN — Medication 100 MILLIGRAM(S): at 05:27

## 2024-03-16 RX ADMIN — APIXABAN 2.5 MILLIGRAM(S): 2.5 TABLET, FILM COATED ORAL at 18:07

## 2024-03-16 RX ADMIN — Medication 600 MILLIGRAM(S): at 05:27

## 2024-03-16 RX ADMIN — Medication 600 MILLIGRAM(S): at 06:21

## 2024-03-16 RX ADMIN — AMLODIPINE BESYLATE 5 MILLIGRAM(S): 2.5 TABLET ORAL at 05:28

## 2024-03-16 RX ADMIN — Medication 100 MILLIGRAM(S): at 14:32

## 2024-03-16 RX ADMIN — Medication 1 APPLICATION(S): at 18:02

## 2024-03-16 RX ADMIN — Medication 1 TABLET(S): at 12:04

## 2024-03-16 RX ADMIN — Medication 975 MILLIGRAM(S): at 06:21

## 2024-03-16 RX ADMIN — Medication 975 MILLIGRAM(S): at 05:27

## 2024-03-16 RX ADMIN — Medication 1 APPLICATION(S): at 05:36

## 2024-03-16 RX ADMIN — MUPIROCIN 1 APPLICATION(S): 20 OINTMENT TOPICAL at 18:02

## 2024-03-16 RX ADMIN — MUPIROCIN 1 APPLICATION(S): 20 OINTMENT TOPICAL at 05:36

## 2024-03-16 RX ADMIN — Medication 975 MILLIGRAM(S): at 14:36

## 2024-03-16 RX ADMIN — Medication 25 MICROGRAM(S): at 05:28

## 2024-03-16 RX ADMIN — AXITINIB 5 MILLIGRAM(S): 1 TABLET, FILM COATED ORAL at 06:21

## 2024-03-16 RX ADMIN — PANTOPRAZOLE SODIUM 40 MILLIGRAM(S): 20 TABLET, DELAYED RELEASE ORAL at 05:27

## 2024-03-16 RX ADMIN — Medication 975 MILLIGRAM(S): at 16:22

## 2024-03-16 RX ADMIN — AXITINIB 5 MILLIGRAM(S): 1 TABLET, FILM COATED ORAL at 18:08

## 2024-03-16 NOTE — CONSULT NOTE ADULT - SUBJECTIVE AND OBJECTIVE BOX
PARUL SOTO  68y, Female  Allergy: penicillins (Angioedema)  fish (Unknown)      CHIEF COMPLAINT: LE rash (15 Mar 2024 14:14)      LOS  4d    HPI:  A 68Y/F with PMHx of Hypothyroidism, DVT (on eliquis), metastatic renal cell carcinoma s/p L nephrectomy, distal pancreatectomy and splenectomy on chemotherapy Inlyta (follows with Dr. Voss at [facility]) presented to the ED  for evaluation of bilateral lower extremity blistering and rash. Pt states rash started >2 weeks ago on bilateral thighs and anterior lower legs, was recently admitted to [facility] for GIB/blood in stool. At the time was treated with vitamin A&D ointment and advised to follow up with outpatient dermatology. Since discharge, rash on the thighs has improved , however leg areas developed small serous filled blisters 3 days ago. Also endorses pain on anterior shins. Denied recent changes in medications, fever, chills, Chest pain, SOB, N/V/D, abdominal pain, dysuria, recent travel, trauma, sick contacts, dizziness or LOC.    #ED Vitals:  T(C): 36.3 (03-12-24 @ 12:28), Max: 36.3 (03-12-24 @ 12:28)  HR: 74 (03-12-24 @ 12:28) (74 - 74)  BP: 144/76 (03-12-24 @ 12:28) (144/76 - 144/76)  RR: 17 (03-12-24 @ 12:28) (17 - 17)  SpO2: 98% (03-12-24 @ 12:28) (98% - 98%)    #Labs: Hb 11(at baseline)    The patient was seen by Burn team in ED.  The patient is being admitted to medicine for the further management.     (12 Mar 2024 20:38)      INFECTIOUS DISEASE HISTORY:    PAST MEDICAL & SURGICAL HISTORY:  Renal cell carcinoma      Hypertension      Breast cancer      Deep vein thrombosis (DVT)      H/O left nephrectomy      H/O partial adrenalectomy      History of partial pancreatectomy      S/P splenectomy      H/O lumpectomy          FAMILY HISTORY  Family history reviewed and non-contributory    SOCIAL HISTORY  Social History:  Denies etoh use, drug use      ROS  General: Denies rigors, nightsweats  HEENT: Denies headache, rhinorrhea, sore throat, eye pain  CV: Denies CP, palpitations  PULM: Denies wheezing, hemoptysis  GI: Denies hematemesis, hematochezia, melena  : Denies discharge, hematuria  MSK: Denies arthralgias, myalgias  SKIN: Denies rash, lesions  NEURO: Denies paresthesias, weakness  PSYCH: Denies depression, anxiety    VITALS:  T(F): 97.2, Max: 97.2 (03-15-24 @ 07:59)  HR: 60  BP: 122/63  RR: 18Vital Signs Last 24 Hrs  T(C): 36.2 (15 Mar 2024 23:44), Max: 36.2 (15 Mar 2024 07:59)  T(F): 97.2 (15 Mar 2024 23:44), Max: 97.2 (15 Mar 2024 07:59)  HR: 60 (15 Mar 2024 23:44) (60 - 69)  BP: 122/63 (15 Mar 2024 23:44) (117/66 - 125/72)  BP(mean): --  RR: 18 (15 Mar 2024 23:44) (18 - 18)  SpO2: --        PHYSICAL EXAM:  Gen: NAD, resting in bed  HEENT: Normocephalic, atraumatic  Neck: supple, no lymphadenopathy  CV: Regular rate & regular rhythm  Lungs: decreased BS at bases, no fremitus  Abdomen: Soft, BS present  Ext: Warm, well perfused  Neuro: non focal, awake  Skin: lower extremities with superficial ulcers -- blisters appear to have popped with breaks in skin -- no purulent drainage   Lines: no phlebitis    TESTS & MEASUREMENTS:                        9.8    6.17  )-----------( 238      ( 15 Mar 2024 07:28 )             30.0    03-15    135  |  102  |  21<H>  ----------------------------<  77  4.3   |  20  |  1.2    Ca    7.4<L>      15 Mar 2024 07:28  Mg     2.0     03-15    TPro  6.4  /  Alb  2.9<L>  /  TBili  0.2  /  DBili  x   /  AST  22  /  ALT  12  /  AlkPhos  84  03-15      LIVER FUNCTIONS - ( 15 Mar 2024 07:28 )  Alb: 2.9 g/dL / Pro: 6.4 g/dL / ALK PHOS: 84 U/L / ALT: 12 U/L / AST: 22 U/L / GGT: x          Urinalysis Basic - ( 15 Mar 2024 07:28 )    Color: x / Appearance: x / SG: x / pH: x  Gluc: 77 mg/dL / Ketone: x  / Bili: x / Urobili: x  Blood: x / Protein: x / Nitrite: x  Leuk Esterase: x / RBC: x / WBC x  Sq Epi: x / Non Sq Epi: x / Bacteria: x        Culture - Other (collected 03-13-24 @ 11:20)  Source: Wound Wound  Final Report (03-15-24 @ 13:58):    No growth to date.            INFECTIOUS DISEASES TESTING  Hepatitis B Surface Antigen: Nonreact (03-13-24 @ 05:39)  HIV-1/2 Combo Result: Nonreact (03-13-24 @ 05:39)      RADIOLOGY & ADDITIONAL TESTS:  I have personally reviewed the last Chest xray  CXR      CT      CARDIOLOGY TESTING      MEDICATIONS  acetaminophen     Tablet .. 975 Oral every 8 hours  amLODIPine   Tablet 5 Oral daily  apixaban 2.5 Oral every 12 hours  axitinib 5 Oral every 12 hours  bacitracin   Ointment 1 Topical two times a day  calcium carbonate 1250 mG  + Vitamin D (OsCal 500 + D) 1 Oral daily  clindamycin IVPB 600 IV Intermittent every 8 hours  clindamycin IVPB    ibuprofen  Tablet. 600 Oral every 8 hours  levothyroxine 25 Oral daily  lidocaine 1%/epinephrine 1:100,000 Inj 1 Local Injection once  mupirocin 2% Ointment 1 Topical two times a day  pantoprazole    Tablet 40 Oral before breakfast      Weight  Weight (kg): 45.8 (03-12-24 @ 12:28)    ANTIBIOTICS:  clindamycin IVPB 600 milliGRAM(s) IV Intermittent every 8 hours  clindamycin IVPB          ALLERGIES:  penicillins (Angioedema)  fish (Unknown)      ASSESSMENT  A 68Y/F with PMHx of Hypothyroidism, DVT (on eliquis), metastatic renal cell carcinoma s/p L nephrectomy, distal pancreatectomy and splenectomy on chemotherapy Inlyta (follows with Dr. Voss at [facility]) presented to the ED  for evaluation of bilateral lower extremity blistering and rash. Pt states rash started >2 weeks ago on bilateral thighs and anterior lower legs, was recently admitted to [facility] for GIB/blood in stool. At the time was treated with vitamin A&D ointment and advised to follow up with outpatient dermatology. Since discharge, rash on the thighs has improved , however leg areas developed small serous filled blisters 3 days ago. Also endorses pain on anterior shins. Denied recent changes in medications, fever, chills, Chest pain, SOB, N/V/D, abdominal pain, dysuria, recent travel, trauma, sick contacts, dizziness or LOC.    IMPRESSION  #Lower Extremity Blistering/Rash - unclear etiology -- possible inflammatory  - s/p skin biopsy  3/13-- wound Cx NG  - HIV negative  - Acute hepatitis panel negative  - ANCA panel negative  - Anti SS-A Antibody: 1.7 AI (03.13.24 @ 05:39)    #Metastatic Renal Carcinoma s/p left nephrectomy, Distal pancreatectomy and Splenectomy - on chemo    #Abx allergy:Penicillin (rash)     RECOMMENDATIONS  - on exam, no clear gross signs of infection, but patient reports swelling improved since admission   - continue clindamycin 600 mg q 8 hours - switch to PO to complete 7 days total antibiotics   - follow-up path    Please call or message on [employer] Teams if with any questions.  Spectra [phone]

## 2024-03-16 NOTE — PROGRESS NOTE ADULT - SUBJECTIVE AND OBJECTIVE BOX
24H events:    Patient is a 68y old Female who presents with a chief complaint of LE rash (15 Mar 2024 22:07)    Primary diagnosis of Rash    Today is hospital day 4d. This morning patient was seen and examined at bedside, resting comfortably in bed.    No acute or major events overnight.    Code Status: Full code    PAST MEDICAL & SURGICAL HISTORY  Renal cell carcinoma    Hypertension    Breast cancer    Deep vein thrombosis (DVT)    H/O left nephrectomy    H/O partial adrenalectomy    History of partial pancreatectomy    S/P splenectomy    H/O lumpectomy      SOCIAL HISTORY:  Social History:      ALLERGIES:  penicillins (Angioedema)  fish (Unknown)    MEDICATIONS:  STANDING MEDICATIONS  acetaminophen     Tablet .. 975 milliGRAM(s) Oral every 8 hours  amLODIPine   Tablet 5 milliGRAM(s) Oral daily  apixaban 2.5 milliGRAM(s) Oral every 12 hours  axitinib 5 milliGRAM(s) Oral every 12 hours  bacitracin   Ointment 1 Application(s) Topical two times a day  calcium carbonate 1250 mG  + Vitamin D (OsCal 500 + D) 1 Tablet(s) Oral daily  clindamycin IVPB      clindamycin IVPB 600 milliGRAM(s) IV Intermittent every 8 hours  ibuprofen  Tablet. 600 milliGRAM(s) Oral every 8 hours  levothyroxine 25 MICROGram(s) Oral daily  lidocaine 1%/epinephrine 1:100,000 Inj 1 Vial(s) Local Injection once  mupirocin 2% Ointment 1 Application(s) Topical two times a day  pantoprazole    Tablet 40 milliGRAM(s) Oral before breakfast    PRN MEDICATIONS  aluminum hydroxide/magnesium hydroxide/simethicone Suspension 30 milliLiter(s) Oral every 4 hours PRN  melatonin 3 milliGRAM(s) Oral at bedtime PRN  ondansetron Injectable 4 milliGRAM(s) IV Push every 8 hours PRN  traMADol 50 milliGRAM(s) Oral every 8 hours PRN    VITALS:   T(F): 97.7  HR: 55  BP: 119/70  RR: 18  SpO2: --    PHYSICAL EXAM:   GENERAL: NAD, lying in bed comfortably  HEAD:  Atraumatic, Normocephalic  EYES: EOMI, sclera clear  CHEST/LUNG: Clear to auscultation anteriorly bilaterally  HEART: Regular rate and rhythm; No appreciable murmurs, rubs, or gallops  ABDOMEN: Soft, nontender, nondistended  EXTREMITIES:  No clubbing, cyanosis, or edema  NERVOUS SYSTEM:  A&Ox3, no noted facial droop.   SKIN: stasis dermatitis over bilateral LE, ace wrap around RLE    (  ) Indwelling George Catheter:   Date insterted:    Reason (  ) Critical illness     (  ) urinary retention    (  ) Accurate Ins/Outs Monitoring     (  ) CMO patient    (  ) Central Line:   Date inserted:  Location: (  ) Right IJ     (  ) Left IJ     (  ) Right Fem     (  ) Left Fem    (  ) SPC        (  ) pigtail       (  ) PEG tube       (  ) colostomy       (  ) jejunostomy  (  ) U-Dall    LABS:                        9.7    5.31  )-----------( 243      ( 16 Mar 2024 06:46 )             29.8     03-16    133<L>  |  105  |  20  ----------------------------<  67<L>  4.3   |  18  |  1.0    Ca    7.0<L>      16 Mar 2024 06:46  Mg     1.9     03-16    TPro  6.3  /  Alb  2.7<L>  /  TBili  0.2  /  DBili  x   /  AST  21  /  ALT  12  /  AlkPhos  78  03-16      Urinalysis Basic - ( 16 Mar 2024 06:46 )    Color: x / Appearance: x / SG: x / pH: x  Gluc: 67 mg/dL / Ketone: x  / Bili: x / Urobili: x   Blood: x / Protein: x / Nitrite: x   Leuk Esterase: x / RBC: x / WBC x   Sq Epi: x / Non Sq Epi: x / Bacteria: x            Culture - Other (collected 13 Mar 2024 11:20)  Source: Wound Wound  Final Report (15 Mar 2024 13:58):    No growth to date.

## 2024-03-16 NOTE — PROGRESS NOTE ADULT - ASSESSMENT
A 68Y/F with PMHx of Hypothyroidism, DVT (on eliquis), metastatic renal cell carcinoma s/p L nephrectomy, distal pancreatectomy and splenectomy on chemotherapy Inlyta (follows with Dr. Voss at [facility]) presented to the ED  for evaluation of bilateral lower extremity blistering and rash. Pt states rash started >2 weeks ago on bilateral thighs and anterior lower legs, was recently admitted to [facility] for GIB/blood in stool. At the time was treated with vitamin A&D ointment and advised to follow up with outpatient dermatology. Since discharge, rash on the thighs has improved , however leg areas developed small serous filled blisters 3 days ago.      #Petechial Rash : Ddx: Infectious Vs Vasculitis Vs Stasis Dermatitis Vs Idiopathic  -Local wound care  -Dermatology consult  -Hep B, Hep C serology negative  -f/U PRIMO negative, anti-ds DNA negative  -F/U C3, C4, Cryoglobulin pending  - atypical ANCA indeterminate  -F/U RF (negative)  -will treat with Empiric Abs(Clinda as the patient is allergic to penicillin) due to open wound on right posterior leg  -s/p skin bx 3/13  - Local wound care for now: bacitracin/xeroform/kerlix/ACE bandage BID  - wcx taken 3/13 negative    #H/o renal cell CA   - s/p  L nephrectomy  -continue with Inlyta 5mg 2tab OD    # H/O DVT  - c/w Eliquis     # H/O Hypothyroidism   - c/w synthroid     #MISC:  -DVT ppx: Eliquis  -GI ppx: PPI  -Diet: DASH  -Activity: IAT  -Code status: Full code           A 68Y/F with PMHx of Hypothyroidism, DVT (on eliquis), metastatic renal cell carcinoma s/p L nephrectomy, distal pancreatectomy and splenectomy on chemotherapy Inlyta (follows with Dr. Voss at [facility]) presented to the ED  for evaluation of bilateral lower extremity blistering and rash. Pt states rash started >2 weeks ago on bilateral thighs and anterior lower legs, was recently admitted to [facility] for GIB/blood in stool. At the time was treated with vitamin A&D ointment and advised to follow up with outpatient dermatology. Since discharge, rash on the thighs has improved , however leg areas developed small serous filled blisters 3 days ago.      #Petechial Rash : Ddx: Infectious Vs Vasculitis Vs Stasis Dermatitis Vs Idiopathic  -Local wound care  -Dermatology consult  -Hep B, Hep C serology negative  -f/U PRIMO negative, anti-ds DNA negative  -F/U C3, C4, Cryoglobulin pending  - atypical ANCA indeterminate  -F/U RF (negative)  -will treat with Empiric Abs(Clinda as the patient is allergic to penicillin) due to open wound on right posterior leg  -s/p skin bx 3/13  - Local wound care for now: bacitracin/xeroform/kerlix/ACE bandage BID  - wcx taken 3/13 negative  - Derm recommending ID follow up    #H/o renal cell CA   - s/p  L nephrectomy  -continue with Inlyta 5mg 2tab OD    # H/O DVT  - c/w Eliquis     # H/O Hypothyroidism   - c/w synthroid     #MISC:  -DVT ppx: Eliquis  -GI ppx: PPI  -Diet: DASH  -Activity: IAT  -Code status: Full code

## 2024-03-16 NOTE — PROGRESS NOTE ADULT - PROVIDER SPECIALTY LIST ADULT
Hospitalist
Internal Medicine
Burn
Internal Medicine

## 2024-03-18 PROBLEM — I82.409 ACUTE EMBOLISM AND THROMBOSIS OF UNSPECIFIED DEEP VEINS OF UNSPECIFIED LOWER EXTREMITY: Chronic | Status: ACTIVE | Noted: 2024-03-12

## 2024-03-18 LAB
ANA PAT FLD IF-IMP: ABNORMAL
ANA TITR SER: ABNORMAL
CRYOGLOB SERPL-MCNC: NEGATIVE — SIGNIFICANT CHANGE UP
SURGICAL PATHOLOGY STUDY: SIGNIFICANT CHANGE UP

## 2024-03-26 ENCOUNTER — OUTPATIENT (OUTPATIENT)
Dept: OUTPATIENT SERVICES | Facility: HOSPITAL | Age: 69
LOS: 1 days | End: 2024-03-26
Payer: MEDICARE

## 2024-03-26 ENCOUNTER — APPOINTMENT (OUTPATIENT)
Dept: BURN CARE | Facility: CLINIC | Age: 69
End: 2024-03-26
Payer: MEDICARE

## 2024-03-26 VITALS — DIASTOLIC BLOOD PRESSURE: 91 MMHG | TEMPERATURE: 97.7 F | HEART RATE: 61 BPM | SYSTOLIC BLOOD PRESSURE: 146 MMHG

## 2024-03-26 DIAGNOSIS — Z98.890 OTHER SPECIFIED POSTPROCEDURAL STATES: Chronic | ICD-10-CM

## 2024-03-26 DIAGNOSIS — Z90.5 ACQUIRED ABSENCE OF KIDNEY: Chronic | ICD-10-CM

## 2024-03-26 DIAGNOSIS — Z00.8 ENCOUNTER FOR OTHER GENERAL EXAMINATION: ICD-10-CM

## 2024-03-26 DIAGNOSIS — E89.6 POSTPROCEDURAL ADRENOCORTICAL (-MEDULLARY) HYPOFUNCTION: Chronic | ICD-10-CM

## 2024-03-26 DIAGNOSIS — Z90.411 ACQUIRED PARTIAL ABSENCE OF PANCREAS: Chronic | ICD-10-CM

## 2024-03-26 DIAGNOSIS — Z90.81 ACQUIRED ABSENCE OF SPLEEN: Chronic | ICD-10-CM

## 2024-03-26 PROCEDURE — 99212 OFFICE O/P EST SF 10 MIN: CPT

## 2024-03-27 NOTE — REASON FOR VISIT
[Revisit] : revisit [Were you seen in the Emergency Room?] : seen in the emergency room [Were you admitted to the burn center at [facility]?] : not admitted to the burn center at [facility] [Family Member] : family member

## 2024-03-27 NOTE — PHYSICAL EXAM
[Healing] : healing [Size%: ______] : Size: [unfilled]% [Infected?] : Infected: No [3] : 3 out of 10 [Abnormal] : abnormal [Large] : medium [] : yes [de-identified] : The  right lower leg wound measures 20x10 cm and the left lower leg measures [id number] and are healing .  The right lower leg biopsy site measures 2x1cm and is healing  and sutures are removed.  The patient was instructed to clean  the wound with soap and water. Continue local wound care. Follow up prn. and f/u with PMD.  [TWNoteComboBox1] : BENJIE

## 2024-03-27 NOTE — REASON FOR VISIT
[Revisit] : revisit [Were you seen in the Emergency Room?] : seen in the emergency room [Were you admitted to the burn center at [facility]?] : not admitted to the burn center at [facility] [Family Member] : family member

## 2024-03-27 NOTE — ASSESSMENT
[FreeTextEntry1] : The  right lower leg wound measures 20x10 cm and the left lower leg measures [id number] and are healing .  The right lower leg biopsy site measures 2x1cm and is healing  and sutures are removed.  The patient was instructed to clean  the wound with soap and water. Continue local wound care. Follow up prn. and f/u with PMD.  [Wound Care] : wound care

## 2024-03-27 NOTE — PHYSICAL EXAM
[Healing] : healing [Size%: ______] : Size: [unfilled]% [Infected?] : Infected: No [3] : 3 out of 10 [Abnormal] : abnormal [Large] : medium [] : yes [de-identified] : The  right lower leg wound measures 20x10 cm and the left lower leg measures [id number] and are healing .  The right lower leg biopsy site measures 2x1cm and is healing  and sutures are removed.  The patient was instructed to clean  the wound with soap and water. Continue local wound care. Follow up prn. and f/u with PMD.  [TWNoteComboBox1] : BENJIE

## 2024-03-27 NOTE — ASSESSMENT
[FreeTextEntry1] : The  right lower leg wound measures 20x10 cm and the left lower leg measures [id number] and are healing .  The right lower leg biopsy site measures 2x1cm and is healing  and sutures are removed.  The patient was instructed to clean  the wound with soap and water. Continue local wound care. Follow up prn. and f/u with PMD.  [Wound Care] : wound care
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
## 2024-03-28 DIAGNOSIS — L98.8 OTHER SPECIFIED DISORDERS OF THE SKIN AND SUBCUTANEOUS TISSUE: ICD-10-CM

## 2024-04-02 ENCOUNTER — APPOINTMENT (OUTPATIENT)
Dept: BURN CARE | Facility: CLINIC | Age: 69
End: 2024-04-02
Payer: MEDICARE

## 2024-04-02 ENCOUNTER — OUTPATIENT (OUTPATIENT)
Dept: OUTPATIENT SERVICES | Facility: HOSPITAL | Age: 69
LOS: 1 days | End: 2024-04-02
Payer: MEDICARE

## 2024-04-02 DIAGNOSIS — E89.6 POSTPROCEDURAL ADRENOCORTICAL (-MEDULLARY) HYPOFUNCTION: Chronic | ICD-10-CM

## 2024-04-02 DIAGNOSIS — Z90.5 ACQUIRED ABSENCE OF KIDNEY: Chronic | ICD-10-CM

## 2024-04-02 DIAGNOSIS — Z90.411 ACQUIRED PARTIAL ABSENCE OF PANCREAS: Chronic | ICD-10-CM

## 2024-04-02 DIAGNOSIS — Z98.890 OTHER SPECIFIED POSTPROCEDURAL STATES: Chronic | ICD-10-CM

## 2024-04-02 DIAGNOSIS — Z00.8 ENCOUNTER FOR OTHER GENERAL EXAMINATION: ICD-10-CM

## 2024-04-02 DIAGNOSIS — Z90.81 ACQUIRED ABSENCE OF SPLEEN: Chronic | ICD-10-CM

## 2024-04-02 PROCEDURE — 99212 OFFICE O/P EST SF 10 MIN: CPT

## 2024-04-02 NOTE — PHYSICAL EXAM
[Healing] : healing [Size%: ______] : Size: [unfilled]% [Infected?] : Infected: No [3] : 3 out of 10 [Abnormal] : abnormal [Small] : small  [] : no [de-identified] : The  right lower leg wound measures 20x10 cm and the left lower leg measures [id number] and are healing .  The right lower leg biopsy site measures 2x1cm and is healing.   The patient was instructed to clean  the wound with soap and water. Continue local wound care. Follow up prn. and f/u with PMD.  [TWNoteComboBox1] : BENJIE

## 2024-04-02 NOTE — REASON FOR VISIT
[Revisit] : revisit [Were you seen in the Emergency Room?] : seen in the emergency room [Were you admitted to the burn center at [facility]?] : not admitted to the burn center at [facility] [Family Member] : family member

## 2024-04-02 NOTE — ASSESSMENT
[Wound Care] : wound care [FreeTextEntry1] : The  right lower leg wound measures 20x10 cm and the left lower leg measures [id number] and are healing .  The right lower leg biopsy site measures 2x1cm and is healing.   The patient was instructed to clean  the wound with soap and water. Continue local wound care. Follow up prn. and f/u with PMD.
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
## 2024-04-03 DIAGNOSIS — L98.8 OTHER SPECIFIED DISORDERS OF THE SKIN AND SUBCUTANEOUS TISSUE: ICD-10-CM

## 2024-04-17 ENCOUNTER — APPOINTMENT (OUTPATIENT)
Dept: GASTROENTEROLOGY | Facility: CLINIC | Age: 69
End: 2024-04-17
Payer: MEDICARE

## 2024-04-17 ENCOUNTER — OUTPATIENT (OUTPATIENT)
Dept: OUTPATIENT SERVICES | Facility: HOSPITAL | Age: 69
LOS: 1 days | End: 2024-04-17
Payer: MEDICARE

## 2024-04-17 VITALS
OXYGEN SATURATION: 94 % | DIASTOLIC BLOOD PRESSURE: 88 MMHG | BODY MASS INDEX: 20.24 KG/M2 | SYSTOLIC BLOOD PRESSURE: 149 MMHG | HEIGHT: 62 IN | WEIGHT: 110 LBS | HEART RATE: 78 BPM | TEMPERATURE: 97.3 F

## 2024-04-17 DIAGNOSIS — Z90.5 ACQUIRED ABSENCE OF KIDNEY: Chronic | ICD-10-CM

## 2024-04-17 DIAGNOSIS — Z00.00 ENCOUNTER FOR GENERAL ADULT MEDICAL EXAMINATION WITHOUT ABNORMAL FINDINGS: ICD-10-CM

## 2024-04-17 DIAGNOSIS — E89.6 POSTPROCEDURAL ADRENOCORTICAL (-MEDULLARY) HYPOFUNCTION: Chronic | ICD-10-CM

## 2024-04-17 DIAGNOSIS — Z98.890 OTHER SPECIFIED POSTPROCEDURAL STATES: Chronic | ICD-10-CM

## 2024-04-17 DIAGNOSIS — Z90.81 ACQUIRED ABSENCE OF SPLEEN: Chronic | ICD-10-CM

## 2024-04-17 DIAGNOSIS — K92.1 MELENA: ICD-10-CM

## 2024-04-17 DIAGNOSIS — I82.409 ACUTE EMBOLISM AND THROMBOSIS OF UNSPECIFIED DEEP VEINS OF UNSPECIFIED LOWER EXTREMITY: ICD-10-CM

## 2024-04-17 DIAGNOSIS — C64.9 MALIGNANT NEOPLASM OF UNSPECIFIED KIDNEY, EXCEPT RENAL PELVIS: ICD-10-CM

## 2024-04-17 DIAGNOSIS — K59.00 CONSTIPATION, UNSPECIFIED: ICD-10-CM

## 2024-04-17 DIAGNOSIS — Z90.411 ACQUIRED PARTIAL ABSENCE OF PANCREAS: Chronic | ICD-10-CM

## 2024-04-17 PROCEDURE — 99202 OFFICE O/P NEW SF 15 MIN: CPT

## 2024-04-17 RX ORDER — SENNOSIDES 8.6 MG/1
8.6 TABLET ORAL AT BEDTIME
Qty: 60 | Refills: 3 | Status: ACTIVE | COMMUNITY
Start: 2024-04-17 | End: 1900-01-01

## 2024-04-17 RX ORDER — POLYETHYLENE GLYCOL 3350 17 G/17G
17 POWDER, FOR SOLUTION ORAL TWICE DAILY
Qty: 60 | Refills: 3 | Status: ACTIVE | COMMUNITY
Start: 2024-04-17 | End: 1900-01-01

## 2024-04-18 NOTE — PHYSICAL EXAM
[Alert] : alert [No Acute Distress] : no acute distress [Sclera] : the sclera and conjunctiva were normal [Normal Appearance] : the appearance of the neck was normal [No Respiratory Distress] : no respiratory distress [No Acc Muscle Use] : no accessory muscle use [Respiration, Rhythm And Depth] : normal respiratory rhythm and effort [Auscultation Breath Sounds / Voice Sounds] : lungs were clear to auscultation bilaterally [Heart Rate And Rhythm] : heart rate was normal and rhythm regular [Normal S1, S2] : normal S1 and S2 [Murmurs] : no murmurs [None] : no edema [Bowel Sounds] : normal bowel sounds [Abdomen Tenderness] : non-tender [Oriented To Time, Place, And Person] : oriented to person, place, and time [de-identified] : frail, thin female [de-identified] : distended abdomen; neg. fluid wave assessment [de-identified] : erythematous, fading rash of b/l LE; no purpura at this time or blistering

## 2024-04-18 NOTE — ASSESSMENT
[FreeTextEntry1] : The pt is a 67 yo female w/ stage 4 RCC s/p distal pancreatectomy/splenectomy/left nephrectomy/adrenalectomy, known LLE DVT on Eliquis (dx in 2019; has IVC filter in place), and two recent admissions to [facility] in 2/2024 (2 episodes of BRBPR) and in 3/2024 (bilateral lower extremity blistering and rash s/p tx by Burn and bx which was inconclusive). Pt presenting to GI for further evaluation of hematochezia.  #Hematochezia  #2/2024 admission to [facility] for sig. BRBPR #s/p colonoscopy w/ failed prep  -pt to be scheduled for new colonoscopy -pt unavailable until after 5/5/2024 -miralax BID and 2 tabs senna qhs followed by 2 days of prep -Eliquis needs to be held 72 hrs prior to scope and will require clearance by H/O (placed pt on AC initially; IVC filter in place per pt)  #Normocytic anemia  -HH 11.1/33.9 and MCV 98 -f/u with PCP for regular lab testing -pt should go to ED immediately if large bloody BM detected  #RCC stage 4 w/ mets to liver, sacrum -H/O following -pt to see Rad-Onc for palliative tx   #Blistering rash of b/l LE s/p tx by Burn Srgy -seen by Derm -seen by ID inpatient -pathology inconclusive (maybe leukocytoclastic vasculitis) -resolving rash -continue to f/u with Derm for evaluation -pt neg. for HCV/other Hepatitis viruses -PRIMO speckled + and inconclusive atypcial  ANCA

## 2024-04-18 NOTE — REVIEW OF SYSTEMS
[Abdominal Pain] : abdominal pain [Constipation] : constipation [Bleeding] : bleeding [Bloating (gassiness)] : bloating [Skin Lesions] : skin lesion [Anxiety] : anxiety [Fever] : no fever [Chills] : no chills [Recent Weight Gain (___ Lbs)] : no recent weight gain [Recent Weight Loss (___ Lbs)] : no recent weight loss [Chest Pain] : no chest pain [Palpitations] : no palpitations [Lower Ext Edema (lower leg swelling)] : no lower extremity edema [Shortness Of Breath] : no shortness of breath [Wheezing] : no wheezing [Vomiting] : no vomiting [Diarrhea] : no diarrhea [Melena (black stool)] : no melena [Fecal Incontinence (soiling)] : no fecal incontinence [Arthralgias (joint pain)] : no arthralgias [Joint Swelling] : no joint swelling [Limb Swelling] : no limb swelling
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
## 2024-04-24 DIAGNOSIS — C64.9 MALIGNANT NEOPLASM OF UNSPECIFIED KIDNEY, EXCEPT RENAL PELVIS: ICD-10-CM

## 2024-04-24 DIAGNOSIS — I82.409 ACUTE EMBOLISM AND THROMBOSIS OF UNSPECIFIED DEEP VEINS OF UNSPECIFIED LOWER EXTREMITY: ICD-10-CM

## 2024-04-24 DIAGNOSIS — K92.1 MELENA: ICD-10-CM

## 2024-04-24 DIAGNOSIS — K59.00 CONSTIPATION, UNSPECIFIED: ICD-10-CM

## 2024-05-21 ENCOUNTER — INPATIENT (INPATIENT)
Facility: HOSPITAL | Age: 69
LOS: 8 days | Discharge: HOSPICE | DRG: 54 | End: 2024-05-30
Attending: INTERNAL MEDICINE | Admitting: STUDENT IN AN ORGANIZED HEALTH CARE EDUCATION/TRAINING PROGRAM
Payer: MEDICARE

## 2024-05-21 VITALS
HEIGHT: 61 IN | RESPIRATION RATE: 18 BRPM | HEART RATE: 64 BPM | TEMPERATURE: 96 F | WEIGHT: 100.09 LBS | SYSTOLIC BLOOD PRESSURE: 102 MMHG | DIASTOLIC BLOOD PRESSURE: 62 MMHG | OXYGEN SATURATION: 99 %

## 2024-05-21 DIAGNOSIS — E83.52 HYPERCALCEMIA: ICD-10-CM

## 2024-05-21 DIAGNOSIS — Z79.899 OTHER LONG TERM (CURRENT) DRUG THERAPY: ICD-10-CM

## 2024-05-21 DIAGNOSIS — J91.0 MALIGNANT PLEURAL EFFUSION: ICD-10-CM

## 2024-05-21 DIAGNOSIS — Z88.0 ALLERGY STATUS TO PENICILLIN: ICD-10-CM

## 2024-05-21 DIAGNOSIS — J18.9 PNEUMONIA, UNSPECIFIED ORGANISM: ICD-10-CM

## 2024-05-21 DIAGNOSIS — Z79.890 HORMONE REPLACEMENT THERAPY: ICD-10-CM

## 2024-05-21 DIAGNOSIS — C79.51 SECONDARY MALIGNANT NEOPLASM OF BONE: ICD-10-CM

## 2024-05-21 DIAGNOSIS — Z90.81 ACQUIRED ABSENCE OF SPLEEN: Chronic | ICD-10-CM

## 2024-05-21 DIAGNOSIS — G92.8 OTHER TOXIC ENCEPHALOPATHY: ICD-10-CM

## 2024-05-21 DIAGNOSIS — Z90.411 ACQUIRED PARTIAL ABSENCE OF PANCREAS: ICD-10-CM

## 2024-05-21 DIAGNOSIS — E89.6 POSTPROCEDURAL ADRENOCORTICAL (-MEDULLARY) HYPOFUNCTION: Chronic | ICD-10-CM

## 2024-05-21 DIAGNOSIS — C78.00 SECONDARY MALIGNANT NEOPLASM OF UNSPECIFIED LUNG: ICD-10-CM

## 2024-05-21 DIAGNOSIS — Z85.3 PERSONAL HISTORY OF MALIGNANT NEOPLASM OF BREAST: ICD-10-CM

## 2024-05-21 DIAGNOSIS — Z86.718 PERSONAL HISTORY OF OTHER VENOUS THROMBOSIS AND EMBOLISM: ICD-10-CM

## 2024-05-21 DIAGNOSIS — Z90.5 ACQUIRED ABSENCE OF KIDNEY: ICD-10-CM

## 2024-05-21 DIAGNOSIS — Z66 DO NOT RESUSCITATE: ICD-10-CM

## 2024-05-21 DIAGNOSIS — R26.89 OTHER ABNORMALITIES OF GAIT AND MOBILITY: ICD-10-CM

## 2024-05-21 DIAGNOSIS — Z91.013 ALLERGY TO SEAFOOD: ICD-10-CM

## 2024-05-21 DIAGNOSIS — Z90.5 ACQUIRED ABSENCE OF KIDNEY: Chronic | ICD-10-CM

## 2024-05-21 DIAGNOSIS — Z98.890 OTHER SPECIFIED POSTPROCEDURAL STATES: Chronic | ICD-10-CM

## 2024-05-21 DIAGNOSIS — Z90.81 ACQUIRED ABSENCE OF SPLEEN: ICD-10-CM

## 2024-05-21 DIAGNOSIS — Z53.20 PROCEDURE AND TREATMENT NOT CARRIED OUT BECAUSE OF PATIENT'S DECISION FOR UNSPECIFIED REASONS: ICD-10-CM

## 2024-05-21 DIAGNOSIS — F41.9 ANXIETY DISORDER, UNSPECIFIED: ICD-10-CM

## 2024-05-21 DIAGNOSIS — E78.5 HYPERLIPIDEMIA, UNSPECIFIED: ICD-10-CM

## 2024-05-21 DIAGNOSIS — C79.31 SECONDARY MALIGNANT NEOPLASM OF BRAIN: ICD-10-CM

## 2024-05-21 DIAGNOSIS — Z90.411 ACQUIRED PARTIAL ABSENCE OF PANCREAS: Chronic | ICD-10-CM

## 2024-05-21 DIAGNOSIS — E03.8 OTHER SPECIFIED HYPOTHYROIDISM: ICD-10-CM

## 2024-05-21 DIAGNOSIS — I10 ESSENTIAL (PRIMARY) HYPERTENSION: ICD-10-CM

## 2024-05-21 DIAGNOSIS — Z79.01 LONG TERM (CURRENT) USE OF ANTICOAGULANTS: ICD-10-CM

## 2024-05-21 DIAGNOSIS — C64.2 MALIGNANT NEOPLASM OF LEFT KIDNEY, EXCEPT RENAL PELVIS: ICD-10-CM

## 2024-05-21 LAB
ALBUMIN SERPL ELPH-MCNC: 2.7 G/DL — LOW (ref 3.5–5.2)
ALP SERPL-CCNC: 309 U/L — HIGH (ref 30–115)
ALT FLD-CCNC: 31 U/L — SIGNIFICANT CHANGE UP (ref 0–41)
ANION GAP SERPL CALC-SCNC: 9 MMOL/L — SIGNIFICANT CHANGE UP (ref 7–14)
AST SERPL-CCNC: 45 U/L — HIGH (ref 0–41)
BASOPHILS # BLD AUTO: 0 K/UL — SIGNIFICANT CHANGE UP (ref 0–0.2)
BASOPHILS NFR BLD AUTO: 0 % — SIGNIFICANT CHANGE UP (ref 0–1)
BILIRUB SERPL-MCNC: 0.4 MG/DL — SIGNIFICANT CHANGE UP (ref 0.2–1.2)
BUN SERPL-MCNC: 21 MG/DL — HIGH (ref 10–20)
CALCIUM SERPL-MCNC: 11.1 MG/DL — HIGH (ref 8.4–10.5)
CHLORIDE SERPL-SCNC: 97 MMOL/L — LOW (ref 98–110)
CO2 SERPL-SCNC: 28 MMOL/L — SIGNIFICANT CHANGE UP (ref 17–32)
CREAT SERPL-MCNC: 1.1 MG/DL — SIGNIFICANT CHANGE UP (ref 0.7–1.5)
EGFR: 55 ML/MIN/1.73M2 — LOW
EOSINOPHIL # BLD AUTO: 0 K/UL — SIGNIFICANT CHANGE UP (ref 0–0.7)
EOSINOPHIL NFR BLD AUTO: 0 % — SIGNIFICANT CHANGE UP (ref 0–8)
GLUCOSE SERPL-MCNC: 91 MG/DL — SIGNIFICANT CHANGE UP (ref 70–99)
HCT VFR BLD CALC: 29.9 % — LOW (ref 37–47)
HGB BLD-MCNC: 9.1 G/DL — LOW (ref 12–16)
LYMPHOCYTES # BLD AUTO: 1.53 K/UL — SIGNIFICANT CHANGE UP (ref 1.2–3.4)
LYMPHOCYTES # BLD AUTO: 13.3 % — LOW (ref 20.5–51.1)
MAGNESIUM SERPL-MCNC: 2.1 MG/DL — SIGNIFICANT CHANGE UP (ref 1.8–2.4)
MCHC RBC-ENTMCNC: 28.8 PG — SIGNIFICANT CHANGE UP (ref 27–31)
MCHC RBC-ENTMCNC: 30.4 G/DL — LOW (ref 32–37)
MCV RBC AUTO: 94.6 FL — SIGNIFICANT CHANGE UP (ref 81–99)
MONOCYTES # BLD AUTO: 1.62 K/UL — HIGH (ref 0.1–0.6)
MONOCYTES NFR BLD AUTO: 14.1 % — HIGH (ref 1.7–9.3)
NEUTROPHILS # BLD AUTO: 8.36 K/UL — HIGH (ref 1.4–6.5)
NEUTROPHILS NFR BLD AUTO: 72.6 % — SIGNIFICANT CHANGE UP (ref 42.2–75.2)
PLATELET # BLD AUTO: 453 K/UL — HIGH (ref 130–400)
PMV BLD: 9.9 FL — SIGNIFICANT CHANGE UP (ref 7.4–10.4)
POTASSIUM SERPL-MCNC: 4.8 MMOL/L — SIGNIFICANT CHANGE UP (ref 3.5–5)
POTASSIUM SERPL-SCNC: 4.8 MMOL/L — SIGNIFICANT CHANGE UP (ref 3.5–5)
PROT SERPL-MCNC: 6.9 G/DL — SIGNIFICANT CHANGE UP (ref 6–8)
RBC # BLD: 3.16 M/UL — LOW (ref 4.2–5.4)
RBC # FLD: 19.3 % — HIGH (ref 11.5–14.5)
SODIUM SERPL-SCNC: 134 MMOL/L — LOW (ref 135–146)
WBC # BLD: 11.52 K/UL — HIGH (ref 4.8–10.8)
WBC # FLD AUTO: 11.52 K/UL — HIGH (ref 4.8–10.8)

## 2024-05-21 PROCEDURE — 85730 THROMBOPLASTIN TIME PARTIAL: CPT

## 2024-05-21 PROCEDURE — 85610 PROTHROMBIN TIME: CPT

## 2024-05-21 PROCEDURE — 36000 PLACE NEEDLE IN VEIN: CPT

## 2024-05-21 PROCEDURE — 84443 ASSAY THYROID STIM HORMONE: CPT

## 2024-05-21 PROCEDURE — 83970 ASSAY OF PARATHORMONE: CPT

## 2024-05-21 PROCEDURE — 71045 X-RAY EXAM CHEST 1 VIEW: CPT

## 2024-05-21 PROCEDURE — 82310 ASSAY OF CALCIUM: CPT

## 2024-05-21 PROCEDURE — 71250 CT THORAX DX C-: CPT | Mod: 26,MC

## 2024-05-21 PROCEDURE — 82652 VIT D 1 25-DIHYDROXY: CPT

## 2024-05-21 PROCEDURE — 36415 COLL VENOUS BLD VENIPUNCTURE: CPT

## 2024-05-21 PROCEDURE — 85025 COMPLETE CBC W/AUTO DIFF WBC: CPT

## 2024-05-21 PROCEDURE — 84439 ASSAY OF FREE THYROXINE: CPT

## 2024-05-21 PROCEDURE — 93010 ELECTROCARDIOGRAM REPORT: CPT

## 2024-05-21 PROCEDURE — 80048 BASIC METABOLIC PNL TOTAL CA: CPT

## 2024-05-21 PROCEDURE — 71045 X-RAY EXAM CHEST 1 VIEW: CPT | Mod: 26

## 2024-05-21 PROCEDURE — 87641 MR-STAPH DNA AMP PROBE: CPT

## 2024-05-21 PROCEDURE — 93005 ELECTROCARDIOGRAM TRACING: CPT

## 2024-05-21 PROCEDURE — 99223 1ST HOSP IP/OBS HIGH 75: CPT

## 2024-05-21 PROCEDURE — 93306 TTE W/DOPPLER COMPLETE: CPT

## 2024-05-21 PROCEDURE — 82306 VITAMIN D 25 HYDROXY: CPT

## 2024-05-21 PROCEDURE — 83036 HEMOGLOBIN GLYCOSYLATED A1C: CPT

## 2024-05-21 PROCEDURE — 87640 STAPH A DNA AMP PROBE: CPT

## 2024-05-21 PROCEDURE — 83519 RIA NONANTIBODY: CPT

## 2024-05-21 PROCEDURE — 70450 CT HEAD/BRAIN W/O DYE: CPT | Mod: 26,MC

## 2024-05-21 PROCEDURE — 99285 EMERGENCY DEPT VISIT HI MDM: CPT | Mod: 25,GC

## 2024-05-21 PROCEDURE — 83735 ASSAY OF MAGNESIUM: CPT

## 2024-05-21 PROCEDURE — 80053 COMPREHEN METABOLIC PANEL: CPT

## 2024-05-21 PROCEDURE — 82330 ASSAY OF CALCIUM: CPT

## 2024-05-21 PROCEDURE — 80061 LIPID PANEL: CPT

## 2024-05-21 RX ORDER — VANCOMYCIN HCL 1 G
750 VIAL (EA) INTRAVENOUS EVERY 12 HOURS
Refills: 0 | Status: DISCONTINUED | OUTPATIENT
Start: 2024-05-21 | End: 2024-05-21

## 2024-05-21 RX ORDER — SODIUM CHLORIDE 9 MG/ML
1000 INJECTION INTRAMUSCULAR; INTRAVENOUS; SUBCUTANEOUS
Refills: 0 | Status: DISCONTINUED | OUTPATIENT
Start: 2024-05-21 | End: 2024-05-26

## 2024-05-21 RX ORDER — AZTREONAM 2 G
2000 VIAL (EA) INJECTION ONCE
Refills: 0 | Status: DISCONTINUED | OUTPATIENT
Start: 2024-05-21 | End: 2024-05-22

## 2024-05-21 RX ORDER — ACETAMINOPHEN 500 MG
650 TABLET ORAL EVERY 6 HOURS
Refills: 0 | Status: DISCONTINUED | OUTPATIENT
Start: 2024-05-21 | End: 2024-05-30

## 2024-05-21 RX ORDER — SODIUM CHLORIDE 9 MG/ML
1000 INJECTION INTRAMUSCULAR; INTRAVENOUS; SUBCUTANEOUS ONCE
Refills: 0 | Status: COMPLETED | OUTPATIENT
Start: 2024-05-21 | End: 2024-05-21

## 2024-05-21 RX ORDER — VANCOMYCIN HCL 1 G
750 VIAL (EA) INTRAVENOUS EVERY 24 HOURS
Refills: 0 | Status: DISCONTINUED | OUTPATIENT
Start: 2024-05-21 | End: 2024-05-22

## 2024-05-21 RX ORDER — METRONIDAZOLE 500 MG
500 TABLET ORAL EVERY 8 HOURS
Refills: 0 | Status: DISCONTINUED | OUTPATIENT
Start: 2024-05-21 | End: 2024-05-22

## 2024-05-21 RX ORDER — AZTREONAM 2 G
2000 VIAL (EA) INJECTION EVERY 8 HOURS
Refills: 0 | Status: DISCONTINUED | OUTPATIENT
Start: 2024-05-22 | End: 2024-05-22

## 2024-05-21 RX ORDER — CHLORHEXIDINE GLUCONATE 213 G/1000ML
1 SOLUTION TOPICAL DAILY
Refills: 0 | Status: DISCONTINUED | OUTPATIENT
Start: 2024-05-21 | End: 2024-05-30

## 2024-05-21 RX ORDER — OXYCODONE HYDROCHLORIDE 5 MG/1
5 TABLET ORAL EVERY 4 HOURS
Refills: 0 | Status: DISCONTINUED | OUTPATIENT
Start: 2024-05-21 | End: 2024-05-28

## 2024-05-21 RX ADMIN — SODIUM CHLORIDE 2000 MILLILITER(S): 9 INJECTION INTRAMUSCULAR; INTRAVENOUS; SUBCUTANEOUS at 20:17

## 2024-05-21 NOTE — H&P ADULT - NSHPPHYSICALEXAM_GEN_ALL_CORE
GENERAL: NAD, lying in bed comfortably  CHEST/LUNG: Clear to auscultation bilaterally; No rales, rhonchi, wheezing, or rubs. Unlabored respirations  HEART: Regular rate and rhythm  ABDOMEN: Soft, Nontender, Nondistended  EXTREMITIES: No clubbing, cyanosis, or edema  NERVOUS SYSTEM:  Alert & Oriented X  speech clear. No deficits GENERAL: NAD, lying in bed comfortably  CHEST/LUNG: B/L crackles, decreased breath sounds  HEART: Regular rate and rhythm  ABDOMEN: Soft, Nontender, Nondistended  EXTREMITIES: No clubbing, cyanosis, or edema  NERVOUS SYSTEM:  Alert & Oriented X4  speech clear. No deficits

## 2024-05-21 NOTE — ED ADULT TRIAGE NOTE - CHIEF COMPLAINT QUOTE
Pt presents to the Ed w/ c/o of multiple medical complaints. Pt is having poor PO intake, recent fall yesterday morning (on eliquis), and not acting her self. PMHx of stage iv renal cell carcinoma

## 2024-05-21 NOTE — ED PROVIDER NOTE - CLINICAL SUMMARY MEDICAL DECISION MAKING FREE TEXT BOX
69-year-old female with PMHx of LE DVT(2019)on Eliquis, HTN, hypothyroidism, metastatic RCC(diagnosed about 5 yrs ago) s/p L nephrectomy, distal pancreatectomy and splenectomy currently not on chemo or radiation tx (follows with Dr. Voss at [facility]), Anxiety, presents to the ED with confusion, poor oral intake, generalized weakness and fall.   Talked to daughter in law(Arleth) over the phone, she states that patient has been increasingly confused over the last month. She gets episodes of confusion where she can't recognize her and seems confused. Has also had increasing difficulty with ambulation and had a fall yesterday. States she was walking to her bathroom when she tripped and fell onto her forearm(bruises noted over the forearm), Denies any head trauma or injuries elsewhere, On Eliquis(last dose taken on 5/20 PM), denies LOC. On my exam pt. was AAOx3 answered all the questions appropriately, appeared comfortable. Patient states she is been feeling more tired with poor appetite and difficulty walking within the last couple of weeks. Pt. also c/o pain all over her body. Denies fevers, chills, SOB, chest pain, nausea, vomiting, diarrhea or urinary symptoms. Of note she was recently admitted to [facility] in March for B/L LE rash which is now improved with topical steroids.  CT head showed cerebral and cerebellar atrophy, mets could not be ruled out.  CT chest non-con:   1. Multiple bilateral pulmonary nodules are identified suspicious for metastases. A nodule is noted in the right upper lobe measuring 13 x 7.2 mm. A nodule measuring 1.2 x 1.2 cm noted in the left upper lobe. Multiple smaller nodules are noted within the left lung. Additional right-sided nodules may be obscured by the large effusion and areas of atelectasis.  2. There is a large right sided pleural effusion, including a large loculation in the right mid and lower lung field measuring 6 x 6 x 5 cm. There is a small left-sided pleural effusion.  Admitted to Medicine for management of AMS, fall, PNA and pleural effusion.

## 2024-05-21 NOTE — H&P ADULT - NSICDXPASTMEDICALHX_GEN_ALL_CORE_FT
PAST MEDICAL HISTORY:  Breast cancer     Deep vein thrombosis (DVT)     Hypertension     Renal cell carcinoma      Statement Selected

## 2024-05-21 NOTE — ED PROVIDER NOTE - PHYSICAL EXAMINATION
VITAL SIGNS: I have reviewed nursing notes and confirm.  CONSTITUTIONAL: well-appearing, non-toxic, NAD  SKIN: Warm dry,  HEAD: NCAT  EYES: EOMI, PERRLA  ENT: Mildly dry mucous membranes  NECK: Supple; non tender  CARD: RRR, no murmurs, rubs or gallops  RESP: clear to ausculation b/l.  No rales, rhonchi, or wheezing.  ABD: soft, nontender, non-distended  EXT: Full ROM, no bony tenderness, trace pedal edema  PSYCH: Cooperative, appropriate.

## 2024-05-21 NOTE — H&P ADULT - ASSESSMENT
69-year-old female with PMHx of DVT(on Eliquis), HTN, hypothyroidism, metastatic RCC s/p L nephrectomy, distal pancreatectomy and splenectomy on chemotherapy Inlyta (follows with Dr. Voss at [facility]) presents to the ED with altered mental status, poor oral intake, generalized weakness and fall.     #AMS- likely Toxic metabolic encephalopathy  #Fall- syncope vs mechanical  #Suspected Post obstructive PNA  #Large Rt sided pleural effusion with loculation  #Multiple B/L lung nodules- likely metastatic  - Currently hemodynamically stable  - CT head with cerebral and cerebellar atrophy  - CT chest non-con:   1. Multiple bilateral pulmonary nodules are identified suspicious for metastases. A nodule is noted in the right upper lobe measuring 13 x 7.2 mm. A nodule measuring 1.2 x 1.2 cm noted in the left upper lobe. Multiple smaller nodules are noted within the left lung. Additional right-sided nodules may be obscured by the large effusion and areas of atelectasis.  2. There is a large right sided pleural effusion, including a large loculation in the right mid and lower lung field measuring 6 x 6 x 5 cm. There is a small left-sided pleural effusion.  - s/p Aztreonam and Levaquin  - Start Vanc, Aztreonam and Flagyl for now, F/u ID consult  - Pulm and CTSx consult for loculated pleural effusion, diagnostic & therapeutic thora and chest tube placement  - Hold Eliquis for possible chest tube placement, Switch Eliquis to Lovenox  - Will get MRI head to r/o brain mets  - F/u TTE    #Suspected Hypercalcemia of malignancy  - Ca 12.1  - Check iCa, PTH, PTHrP, vit D  - s/p 1L NS bolus  - c/w NS @150cc/hr for now, check repeat Ca and adjust the rate prn  - F/u Heme-onc recs    #Metastatic RCC   - s/p L nephrectomy, distal pancreatectomy and splenectomy   - on chemotherapy Inlyta (follows with Dr. Voss at [facility])  - Heme-onc consult  - Palliative consult    #Hypothyroidism  - c/w Synthroid    #Hx of DVT  - On Eliquis    #Misc  -DVT prophylaxis:  -GI prophylaxis:   -Diet: DASH  -Code status:  -Activity: AAT  -Dispo: Medicine    -Med rec confirmed with         69-year-old female with PMHx of LE DVT(2019)on Eliquis, HTN, hypothyroidism, metastatic RCC(diagnosed about 5 yrs ago) s/p L nephrectomy, distal pancreatectomy and splenectomy currently not on chemo or radiationTx (follows with Dr. Voss at [facility]), Anxiety, presents to the ED with confusion, poor oral intake, generalized weakness and fall.     #AMS- likely Toxic metabolic encephalopathy r/o brain mets  #Mechanical fall  #Suspected Post obstructive PNA  #Large Rt sided pleural effusion with loculation  #Multiple B/L lung nodules- likely metastatic  - Currently hemodynamically stable  - CT head with cerebral and cerebellar atrophy  - CT chest non-con:   1. Multiple bilateral pulmonary nodules are identified suspicious for metastases. A nodule is noted in the right upper lobe measuring 13 x 7.2 mm. A nodule measuring 1.2 x 1.2 cm noted in the left upper lobe. Multiple smaller nodules are noted within the left lung. Additional right-sided nodules may be obscured by the large effusion and areas of atelectasis.  2. There is a large right sided pleural effusion, including a large loculation in the right mid and lower lung field measuring 6 x 6 x 5 cm. There is a small left-sided pleural effusion.  - s/p Aztreonam and Levaquin  - Start Vanc, Aztreonam and Flagyl for now, F/u ID consult  - Pulm and CTSx consult for loculated pleural effusion, diagnostic & therapeutic thora and chest tube placement  - Hold Eliquis for possible chest tube placement, Switch Eliquis to Lovenox  - Will get MRI head to r/o brain mets(checklist confirmed with family)  - F/u TTE    #Suspected Hypercalcemia of malignancy  - Ca 12.1  - Check iCa, PTH, PTHrP, vit D  - s/p 1L NS bolus  - c/w NS @150cc/hr for now, check repeat Ca and adjust the rate prn  - Hold Ca and vit D tablet  - F/u Heme-onc recs    #Metastatic RCC with mets to lungs and bones  - s/p L nephrectomy, distal pancreatectomy and splenectomy   - Previously on chemotherapy Inlyta was stopped recently few weeks ago as it was not working, currently not on any Tx  -  follows with Dr. Voss at [facility]  - states never had PET scan, her last imaging was done here CT in March  - F/u metastatic w/u- CT abd/pelvis, bone scan, MRI brain  - Heme-onc consult  - Palliative consult    #Hypothyroidism  - c/w Synthroid 100mcg  - Check TSH    #Hx of LE DVTs(2019)  - On Eliquis 2.5mg BID(last dose on 5/20 PM)    #HLD  - c/w statin    #Anxiety  - recently started on Lexapro(family unsure about the dose, will confirm Am)    #Misc  -DVT prophylaxis: Lovenox  -GI prophylaxis: None  -Diet: NPO for now  -Code status: Full  -Activity: AAT  -Dispo: Medicine    -Med rec confirmed with the family.

## 2024-05-21 NOTE — H&P ADULT - ATTENDING COMMENTS
HPI:  69-year-old female with PMHx of LE DVT(2019)on Eliquis, HTN, hypothyroidism, metastatic RCC(diagnosed about 5 yrs ago) s/p L nephrectomy, distal pancreatectomy and splenectomy currently not on chemo or radiationTx (follows with Dr. Voss at [facility]), Anxiety, presents to the ED with confusion, poor oral intake, generalized weakness and fall.     Talked to daughter in law(Arleth) over the phone, she states that patient has been increasingly confused over the last month. She gets episodes of confusion where she can't recognize her  and seems confused. Has also had increasing difficulty with ambulation and had a fall yesterday. States she was walking to her bathroom when she tripped and fell onto her forearm(bruises noted over the forearm), Denies any head trauma or injuries elsewhere, On Eliquis(last dose taken on 5/20 PM), denies LOC. On my exam pt. was AAOx3 answered all the questions appropriately, appeared comfortable. Patient states she is been feeling more tired with poor appetite and difficulty walking within the last couple of weeks. Pt. also c/o pain all over her body. Denies fevers, chills, SOB, chest pain, nausea, vomiting, diarrhea or urinary symptoms. Of note she was recently admitted to [facility] in March for B/L LE rash which is now improved with topical steroids.     Her chemotherapy(Inylta) was stopped by her oncologist few weeks ago as it was not working her. She takes oxycodone 5mg q4 prn for pain and per daughter in law she gets more confused after taking the pain meds.    In ED, her vitals were stable. satting 99% on room air  Labs significant for WBC 11.5K, Ca 12.1, , AST 45  CT head showed cerebral and cerebellar atrophy, mets could not be ruled out.  CT chest non-con:   1. Multiple bilateral pulmonary nodules are identified suspicious for metastases. A nodule is noted in the right upper lobe measuring 13 x 7.2 mm. A nodule measuring 1.2 x 1.2 cm noted in the left upper lobe. Multiple smaller nodules are noted within the left lung. Additional right-sided nodules may be obscured by the large effusion and areas of atelectasis.  2. There is a large right sided pleural effusion, including a large loculation in the right mid and lower lung field measuring 6 x 6 x 5 cm. There is a small left-sided pleural effusion.    s/p 1L NS bolus, Aztreonam and Levaquin in ED.    Vital Signs Last 24 Hrs  T(F): 95.6 (21 May 2024 13:22), Max: 95.6 (21 May 2024 13:22)  HR: 64 (21 May 2024 13:22) (64 - 64)  BP: 102/62 (21 May 2024 13:22) (102/62 - 102/62)  RR: 18 (21 May 2024 13:22) (18 - 18)  SpO2: 99% (21 May 2024 13:22) (99% - 99%) on room air  Pt. is being admitted to medicine for management of AMS, fall, PNA and pleural effusion.   (21 May 2024 20:56)  REVIEW OF SYSTEMS: see cc/HPI   CONSTITUTIONAL: No weakness, fevers or chills  EYES/ENT: No visual changes;  No vertigo or throat pain   NECK: No pain or stiffness  RESPIRATORY: No cough, wheezing, hemoptysis; No shortness of breath  CARDIOVASCULAR: No chest pain or palpitations  GASTROINTESTINAL: No abdominal or epigastric pain. No nausea, vomiting, or hematemesis; No diarrhea or constipation. No melena or hematochezia.  GENITOURINARY: No dysuria, frequency or hematuria  NEUROLOGICAL: No numbness or weakness  SKIN: No itching, rashes  ENDO: No hyperglycemia, No thyroid disorder, No dyslipidemia   HEM: No bleeding, No easy bruising, No anemia   PSYCHE: No psychosis, No mood disorder No hallucinations No delusion   MSK: No deformity, No fracture, No Joint swelling    Physical Exam:  General: WN/WD NAD  Neurology: A&Ox3, nonfocal, follows commands  Eyes: PERRLA/ EOMI  ENT/Neck: Neck supple, trachea midline, No JVD  Respiratory: CTA B/L, No wheezing, rales, rhonchi  CV: Normal rate regular rhythm, S1S2, no murmurs, rubs or gallops  Abdominal: Soft, NT, ND +BS,   Extremities: No edema, + peripheral pulses  Skin: No Rashes, Hematoma, Ecchymosis    A/p HPI:  69-year-old female with PMHx of LE DVT(2019)on Eliquis, HTN, hypothyroidism, metastatic RCC(diagnosed about 5 yrs ago) s/p L nephrectomy, distal pancreatectomy and splenectomy currently not on chemo or radiationTx (follows with Dr. Voss at [facility]), Anxiety, presents to the ED with confusion, poor oral intake, generalized weakness and fall.     Talked to daughter in law(Arleth) over the phone, she states that patient has been increasingly confused over the last month. She gets episodes of confusion where she can't recognize her  and seems confused. Has also had increasing difficulty with ambulation and had a fall yesterday. States she was walking to her bathroom when she tripped and fell onto her forearm(bruises noted over the forearm), Denies any head trauma or injuries elsewhere, On Eliquis(last dose taken on 5/20 PM), denies LOC. On my exam pt. was AAOx3 answered all the questions appropriately, appeared comfortable. Patient states she is been feeling more tired with poor appetite and difficulty walking within the last couple of weeks. Pt. also c/o pain all over her body. Denies fevers, chills, SOB, chest pain, nausea, vomiting, diarrhea or urinary symptoms. Of note she was recently admitted to [facility] in March for B/L LE rash which is now improved with topical steroids.     Her chemotherapy(Inylta) was stopped by her oncologist few weeks ago as it was not working her. She takes oxycodone 5mg q4 prn for pain and per daughter in law she gets more confused after taking the pain meds.    In ED, her vitals were stable. satting 99% on room air  Labs significant for WBC 11.5K, Ca 12.1, , AST 45  CT head showed cerebral and cerebellar atrophy, mets could not be ruled out.  CT chest non-con:   1. Multiple bilateral pulmonary nodules are identified suspicious for metastases. A nodule is noted in the right upper lobe measuring 13 x 7.2 mm. A nodule measuring 1.2 x 1.2 cm noted in the left upper lobe. Multiple smaller nodules are noted within the left lung. Additional right-sided nodules may be obscured by the large effusion and areas of atelectasis.  2. There is a large right sided pleural effusion, including a large loculation in the right mid and lower lung field measuring 6 x 6 x 5 cm. There is a small left-sided pleural effusion.    s/p 1L NS bolus, Aztreonam and Levaquin in ED.    Vital Signs Last 24 Hrs  T(F): 95.6 (21 May 2024 13:22), Max: 95.6 (21 May 2024 13:22)  HR: 64 (21 May 2024 13:22) (64 - 64)       BP: 102/62 (21 May 2024 13:22) (102/62 - 102/62)    RR: 18 (21 May 2024 13:22) (18 - 18)  SpO2: 99% (21 May 2024 13:22) (99% - 99%) on room air  Pt. is being admitted to medicine for management of AMS, fall, PNA and pleural effusion.   (21 May 2024 20:56)  REVIEW OF SYSTEMS: see cc/HPI   CONSTITUTIONAL: (+) fall / generalized weakness, (+)( decreased appetite,  No fevers or chills  EYES/ENT: No visual changes;  No vertigo or throat pain   NECK: No pain or stiffness  RESPIRATORY: No cough, wheezing, hemoptysis; No shortness of breath  CARDIOVASCULAR: No chest pain or palpitations  GASTROINTESTINAL: No abdominal or epigastric pain. No nausea, vomiting, or hematemesis; No diarrhea or constipation. No melena or hematochezia.  GENITOURINARY: No dysuria, frequency or hematuria  NEUROLOGICAL: No numbness or weakness, confusion   SKIN: No itching, rashes  ENDO: No hyperglycemia, No thyroid disorder, No dyslipidemia   HEM: No bleeding, No easy bruising, No anemia   PSYCHE: No psychosis, No mood disorder No hallucinations No delusion   MSK: No deformity, No fracture, No Joint swelling    Physical Exam:  General: WN/WD NAD  Neurology: A&Ox3, nonfocal, follows commands  Eyes: PERRLA/ EOMI  ENT/Neck: Neck supple, trachea midline, No JVD  Respiratory: CTA B/L, No wheezing, rales, rhonchi  CV: Normal rate regular rhythm, S1S2, no murmurs, rubs or gallops  Abdominal: Soft, NT, ND +BS,   Extremities: No edema, + peripheral pulses  Skin: No Rashes, Hematoma, Ecchymosis    A/p  Progressive confusion -suspected mets to brain in patient w/ RCC r/o TME r/o seizures r/o infection  -Patient will need CT imaging w/ contrast (patient and family refusing but family on the way to the hospital-spoke to raven Waldron on the phone)  Large R pleural effusion and small L pleural effusion- most likely malignancy related r/o empyema   s/p fall    -rEEG   -agree w/ holding Eliquis for now   -CT S eval / Pulm eval   -IV abx   - check blood Cx and repeat WBC     Hypercalcemia most likely 2/2 malignancy   -agree w/ iCa / PTH/ PTHrP / Vit D  -IV fluids   -hold Ca/Vit D    Metastatic RCC   -CT imaging w/ contrast   -Hem/Onc eval   -Palliative consult     Hypothyroidism  Dyslipidemia    -c/w levothyroxine and check TSH   -statin   Anxiety   -lexapro  DVT prophylaxis - lovenox until procedure   PATIENT SEEN 5/21/24 ( note revisited 5/22)

## 2024-05-21 NOTE — H&P ADULT - CONVERSATION DETAILS
Discussed GOC with the patient at bedside and family over the phone. Pt. AAOx4, expressed her wishes to be Full code. She wants chest compressions, intubation, mechanical ventilation, shock if needed to save her. Family stated that patient is very L[employer] day and would want to be Full code. They stated that patient makes her own decisions but usually it's a collaborative decision among the family.

## 2024-05-21 NOTE — ED PROVIDER NOTE - OBJECTIVE STATEMENT
69-year-old female with PMH DVT on Eliquis, HTN, hypothyroidism, metastatic renal cell carcinoma not on chemotherapy presents to the ED complaining of altered mental status with p.o. intake. Sister at bedside states patient has been increasingly confused over the last month.  Has also had increasing difficulty with ambulation and has had falls recently.  Patient states she is been feeling more tired with poor appetite within the last couple of weeks.  Denies fevers, chills, nausea, vomiting, diarrhea or urinary symptoms.

## 2024-05-21 NOTE — H&P ADULT - HISTORY OF PRESENT ILLNESS
69-year-old female with PMHx of DVT(on Eliquis), HTN, hypothyroidism, metastatic RCC s/p L nephrectomy, distal pancreatectomy and splenectomy on chemotherapy Inlyta (follows with Dr. Voss at [facility]) presents to the ED with altered mental status, poor oral intake, generalized weakness and fall. Sister at bedside states patient has been increasingly confused over the last month. Has also had increasing difficulty with ambulation and had a fall yesterday. Denies any head trauma or injuries elsewhere, On Eliquis, denies LOC. Patient states she is been feeling more tired with poor appetite within the last couple of weeks. Denies fevers, chills, nausea, vomiting, diarrhea or urinary symptoms. Of note she was recently admitted to [facility] in March for B/L LE rash.    In ED, her vitals were stable.  Labs significant for WBC 11.5K, Ca 12.1, , AST 45  CT head showed cerebral and cerebellar atrophy, mets coul   69-year-old female with PMHx of DVT(on Eliquis), HTN, hypothyroidism, metastatic RCC s/p L nephrectomy, distal pancreatectomy and splenectomy on chemotherapy Inlyta (follows with Dr. Voss at [facility]) presents to the ED with altered mental status, poor oral intake, generalized weakness and fall. Sister at bedside states patient has been increasingly confused over the last month. Has also had increasing difficulty with ambulation and had a fall yesterday. Denies any head trauma or injuries elsewhere, On Eliquis, denies LOC. Patient states she is been feeling more tired with poor appetite within the last couple of weeks. Denies fevers, chills, nausea, vomiting, diarrhea or urinary symptoms. Of note she was recently admitted to [facility] in March for B/L LE rash.    In ED, her vitals were stable. satting 99% on room air  Labs significant for WBC 11.5K, Ca 12.1, , AST 45  CT head showed cerebral and cerebellar atrophy, mets could not be ruled out.  CT chest non-con:   1. Multiple bilateral pulmonary nodules are identified suspicious for metastases. A nodule is noted in the right upper lobe measuring 13 x 7.2 mm. A nodule measuring 1.2 x 1.2 cm noted in the left upper lobe. Multiple smaller nodules are noted within the left lung. Additional right-sided nodules may be obscured by the large effusion and areas of atelectasis.  2. There is a large right sided pleural effusion, including a large loculation in the right mid and lower lung field measuring 6 x 6 x 5 cm. There is a small left-sided pleural effusion.    s/p 1L NS bolus, Aztreonam and Levaquin in ED.    Vital Signs Last 24 Hrs  T(F): 95.6 (21 May 2024 13:22), Max: 95.6 (21 May 2024 13:22)  HR: 64 (21 May 2024 13:22) (64 - 64)  BP: 102/62 (21 May 2024 13:22) (102/62 - 102/62)  RR: 18 (21 May 2024 13:22) (18 - 18)  SpO2: 99% (21 May 2024 13:22) (99% - 99%) on room air    Pt. is being admitted to medicine for management of  AMS, fall, PNA and pleural effusions.       69-year-old female with PMHx of LE DVT(2019)on Eliquis, HTN, hypothyroidism, metastatic RCC(diagnosed about 5 yrs ago) s/p L nephrectomy, distal pancreatectomy and splenectomy currently not on chemo or radiationTx (follows with Dr. Voss at [facility]), Anxiety, presents to the ED with confusion, poor oral intake, generalized weakness and fall.     Talked to daughter in law(Arleth) over the phone, she states that patient has been increasingly confused over the last month. She gets episodes of confusion where she can't recognize her  and seems confused. Has also had increasing difficulty with ambulation and had a fall yesterday. States she was walking to her bathroom when she tripped and fell onto her forearm(bruises noted over the forearm), Denies any head trauma or injuries elsewhere, On Eliquis(last dose taken on 5/20 PM), denies LOC. On my exam pt. was AAOx3 answered all the questions appropriately, appeared comfortable. Patient states she is been feeling more tired with poor appetite and difficulty walking within the last couple of weeks. Pt. also c/o pain all over her body. Denies fevers, chills, SOB, chest pain, nausea, vomiting, diarrhea or urinary symptoms. Of note she was recently admitted to [facility] in March for B/L LE rash which is now improved with topical steroids.     Her chemotherapy(Inylta) was stopped by her oncologist few weeks ago as it was not working her. She takes oxycodone 5mg q4 prn for pain and per daughter in law she gets more confused after taking the pain meds.    In ED, her vitals were stable. satting 99% on room air  Labs significant for WBC 11.5K, Ca 12.1, , AST 45  CT head showed cerebral and cerebellar atrophy, mets could not be ruled out.  CT chest non-con:   1. Multiple bilateral pulmonary nodules are identified suspicious for metastases. A nodule is noted in the right upper lobe measuring 13 x 7.2 mm. A nodule measuring 1.2 x 1.2 cm noted in the left upper lobe. Multiple smaller nodules are noted within the left lung. Additional right-sided nodules may be obscured by the large effusion and areas of atelectasis.  2. There is a large right sided pleural effusion, including a large loculation in the right mid and lower lung field measuring 6 x 6 x 5 cm. There is a small left-sided pleural effusion.    s/p 1L NS bolus, Aztreonam and Levaquin in ED.    Vital Signs Last 24 Hrs  T(F): 95.6 (21 May 2024 13:22), Max: 95.6 (21 May 2024 13:22)  HR: 64 (21 May 2024 13:22) (64 - 64)  BP: 102/62 (21 May 2024 13:22) (102/62 - 102/62)  RR: 18 (21 May 2024 13:22) (18 - 18)  SpO2: 99% (21 May 2024 13:22) (99% - 99%) on room air    Pt. is being admitted to medicine for management of AMS, fall, PNA and pleural effusion.

## 2024-05-22 DIAGNOSIS — R41.82 ALTERED MENTAL STATUS, UNSPECIFIED: ICD-10-CM

## 2024-05-22 DIAGNOSIS — C64.9 MALIGNANT NEOPLASM OF UNSPECIFIED KIDNEY, EXCEPT RENAL PELVIS: ICD-10-CM

## 2024-05-22 DIAGNOSIS — Z51.5 ENCOUNTER FOR PALLIATIVE CARE: ICD-10-CM

## 2024-05-22 LAB
24R-OH-CALCIDIOL SERPL-MCNC: 7 NG/ML — LOW (ref 30–80)
A1C WITH ESTIMATED AVERAGE GLUCOSE RESULT: 5.4 % — SIGNIFICANT CHANGE UP (ref 4–5.6)
ALBUMIN SERPL ELPH-MCNC: 2.5 G/DL — LOW (ref 3.5–5.2)
ALP SERPL-CCNC: 247 U/L — HIGH (ref 30–115)
ALT FLD-CCNC: 23 U/L — SIGNIFICANT CHANGE UP (ref 0–41)
ANION GAP SERPL CALC-SCNC: 10 MMOL/L — SIGNIFICANT CHANGE UP (ref 7–14)
APTT BLD: 37.7 SEC — SIGNIFICANT CHANGE UP (ref 27–39.2)
AST SERPL-CCNC: 29 U/L — SIGNIFICANT CHANGE UP (ref 0–41)
BASOPHILS # BLD AUTO: 0.03 K/UL — SIGNIFICANT CHANGE UP (ref 0–0.2)
BASOPHILS NFR BLD AUTO: 0.3 % — SIGNIFICANT CHANGE UP (ref 0–1)
BILIRUB SERPL-MCNC: 0.3 MG/DL — SIGNIFICANT CHANGE UP (ref 0.2–1.2)
BUN SERPL-MCNC: 18 MG/DL — SIGNIFICANT CHANGE UP (ref 10–20)
CALCIUM SERPL-MCNC: 10 MG/DL — SIGNIFICANT CHANGE UP (ref 8.4–10.5)
CALCIUM SERPL-MCNC: 10.2 MG/DL — SIGNIFICANT CHANGE UP (ref 8.4–10.5)
CHLORIDE SERPL-SCNC: 99 MMOL/L — SIGNIFICANT CHANGE UP (ref 98–110)
CHOLEST SERPL-MCNC: 73 MG/DL — SIGNIFICANT CHANGE UP
CO2 SERPL-SCNC: 26 MMOL/L — SIGNIFICANT CHANGE UP (ref 17–32)
CREAT SERPL-MCNC: 1 MG/DL — SIGNIFICANT CHANGE UP (ref 0.7–1.5)
EGFR: 61 ML/MIN/1.73M2 — SIGNIFICANT CHANGE UP
EOSINOPHIL # BLD AUTO: 0.02 K/UL — SIGNIFICANT CHANGE UP (ref 0–0.7)
EOSINOPHIL NFR BLD AUTO: 0.2 % — SIGNIFICANT CHANGE UP (ref 0–8)
ESTIMATED AVERAGE GLUCOSE: 108 MG/DL — SIGNIFICANT CHANGE UP (ref 68–114)
GLUCOSE SERPL-MCNC: 101 MG/DL — HIGH (ref 70–99)
HCT VFR BLD CALC: 28.6 % — LOW (ref 37–47)
HDLC SERPL-MCNC: 22 MG/DL — LOW
HGB BLD-MCNC: 8.7 G/DL — LOW (ref 12–16)
IMM GRANULOCYTES NFR BLD AUTO: 0.6 % — HIGH (ref 0.1–0.3)
INR BLD: 1.92 RATIO — HIGH (ref 0.65–1.3)
LIPID PNL WITH DIRECT LDL SERPL: 33 MG/DL — SIGNIFICANT CHANGE UP
LYMPHOCYTES # BLD AUTO: 1.07 K/UL — LOW (ref 1.2–3.4)
LYMPHOCYTES # BLD AUTO: 9.9 % — LOW (ref 20.5–51.1)
MAGNESIUM SERPL-MCNC: 2.2 MG/DL — SIGNIFICANT CHANGE UP (ref 1.8–2.4)
MCHC RBC-ENTMCNC: 28.5 PG — SIGNIFICANT CHANGE UP (ref 27–31)
MCHC RBC-ENTMCNC: 30.4 G/DL — LOW (ref 32–37)
MCV RBC AUTO: 93.8 FL — SIGNIFICANT CHANGE UP (ref 81–99)
MONOCYTES # BLD AUTO: 1.92 K/UL — HIGH (ref 0.1–0.6)
MONOCYTES NFR BLD AUTO: 17.7 % — HIGH (ref 1.7–9.3)
MRSA PCR RESULT.: NEGATIVE — SIGNIFICANT CHANGE UP
NEUTROPHILS # BLD AUTO: 7.73 K/UL — HIGH (ref 1.4–6.5)
NEUTROPHILS NFR BLD AUTO: 71.3 % — SIGNIFICANT CHANGE UP (ref 42.2–75.2)
NON HDL CHOLESTEROL: 51 MG/DL — SIGNIFICANT CHANGE UP
NRBC # BLD: 0 /100 WBCS — SIGNIFICANT CHANGE UP (ref 0–0)
PLATELET # BLD AUTO: 450 K/UL — HIGH (ref 130–400)
PMV BLD: 10 FL — SIGNIFICANT CHANGE UP (ref 7.4–10.4)
POTASSIUM SERPL-MCNC: 5 MMOL/L — SIGNIFICANT CHANGE UP (ref 3.5–5)
POTASSIUM SERPL-SCNC: 5 MMOL/L — SIGNIFICANT CHANGE UP (ref 3.5–5)
PROT SERPL-MCNC: 6.1 G/DL — SIGNIFICANT CHANGE UP (ref 6–8)
PROTHROM AB SERPL-ACNC: 22 SEC — HIGH (ref 9.95–12.87)
PTH-INTACT FLD-MCNC: 5 PG/ML — LOW (ref 15–65)
RBC # BLD: 3.05 M/UL — LOW (ref 4.2–5.4)
RBC # FLD: 19.5 % — HIGH (ref 11.5–14.5)
SODIUM SERPL-SCNC: 135 MMOL/L — SIGNIFICANT CHANGE UP (ref 135–146)
T4 FREE SERPL-MCNC: 1.1 NG/DL — SIGNIFICANT CHANGE UP (ref 0.9–1.8)
TRIGL SERPL-MCNC: 91 MG/DL — SIGNIFICANT CHANGE UP
TSH SERPL-MCNC: 36.7 UIU/ML — HIGH (ref 0.27–4.2)
WBC # BLD: 10.84 K/UL — HIGH (ref 4.8–10.8)
WBC # FLD AUTO: 10.84 K/UL — HIGH (ref 4.8–10.8)

## 2024-05-22 PROCEDURE — 71045 X-RAY EXAM CHEST 1 VIEW: CPT | Mod: 26

## 2024-05-22 PROCEDURE — 99498 ADVNCD CARE PLAN ADDL 30 MIN: CPT

## 2024-05-22 PROCEDURE — 99222 1ST HOSP IP/OBS MODERATE 55: CPT | Mod: GC

## 2024-05-22 PROCEDURE — 99497 ADVNCD CARE PLAN 30 MIN: CPT | Mod: 25

## 2024-05-22 PROCEDURE — 99223 1ST HOSP IP/OBS HIGH 75: CPT

## 2024-05-22 PROCEDURE — 99233 SBSQ HOSP IP/OBS HIGH 50: CPT

## 2024-05-22 PROCEDURE — 93010 ELECTROCARDIOGRAM REPORT: CPT

## 2024-05-22 RX ORDER — ATORVASTATIN CALCIUM 80 MG/1
40 TABLET, FILM COATED ORAL AT BEDTIME
Refills: 0 | Status: DISCONTINUED | OUTPATIENT
Start: 2024-05-22 | End: 2024-05-30

## 2024-05-22 RX ORDER — DENOSUMAB 60 MG/ML
60 INJECTION SUBCUTANEOUS
Refills: 0 | DISCHARGE

## 2024-05-22 RX ORDER — CEFTRIAXONE 500 MG/1
1000 INJECTION, POWDER, FOR SOLUTION INTRAMUSCULAR; INTRAVENOUS EVERY 24 HOURS
Refills: 0 | Status: DISCONTINUED | OUTPATIENT
Start: 2024-05-22 | End: 2024-05-25

## 2024-05-22 RX ORDER — LEVOTHYROXINE SODIUM 125 MCG
1 TABLET ORAL
Qty: 0 | Refills: 0 | DISCHARGE

## 2024-05-22 RX ORDER — AXITINIB 1 MG/1
2 TABLET, FILM COATED ORAL
Refills: 0 | DISCHARGE

## 2024-05-22 RX ORDER — ENOXAPARIN SODIUM 100 MG/ML
40 INJECTION SUBCUTANEOUS EVERY 24 HOURS
Refills: 0 | Status: DISCONTINUED | OUTPATIENT
Start: 2024-05-22 | End: 2024-05-30

## 2024-05-22 RX ORDER — LEVOTHYROXINE SODIUM 125 MCG
100 TABLET ORAL DAILY
Refills: 0 | Status: DISCONTINUED | OUTPATIENT
Start: 2024-05-22 | End: 2024-05-30

## 2024-05-22 RX ORDER — HYDROMORPHONE HYDROCHLORIDE 2 MG/ML
0.2 INJECTION INTRAMUSCULAR; INTRAVENOUS; SUBCUTANEOUS ONCE
Refills: 0 | Status: DISCONTINUED | OUTPATIENT
Start: 2024-05-22 | End: 2024-05-22

## 2024-05-22 RX ADMIN — OXYCODONE HYDROCHLORIDE 5 MILLIGRAM(S): 5 TABLET ORAL at 14:22

## 2024-05-22 RX ADMIN — ENOXAPARIN SODIUM 40 MILLIGRAM(S): 100 INJECTION SUBCUTANEOUS at 05:20

## 2024-05-22 RX ADMIN — Medication 100 MILLIGRAM(S): at 10:48

## 2024-05-22 RX ADMIN — CHLORHEXIDINE GLUCONATE 1 APPLICATION(S): 213 SOLUTION TOPICAL at 12:18

## 2024-05-22 RX ADMIN — OXYCODONE HYDROCHLORIDE 5 MILLIGRAM(S): 5 TABLET ORAL at 11:18

## 2024-05-22 RX ADMIN — HYDROMORPHONE HYDROCHLORIDE 0.2 MILLIGRAM(S): 2 INJECTION INTRAMUSCULAR; INTRAVENOUS; SUBCUTANEOUS at 17:54

## 2024-05-22 RX ADMIN — OXYCODONE HYDROCHLORIDE 5 MILLIGRAM(S): 5 TABLET ORAL at 06:14

## 2024-05-22 RX ADMIN — CEFTRIAXONE 100 MILLIGRAM(S): 500 INJECTION, POWDER, FOR SOLUTION INTRAMUSCULAR; INTRAVENOUS at 14:34

## 2024-05-22 RX ADMIN — ATORVASTATIN CALCIUM 40 MILLIGRAM(S): 80 TABLET, FILM COATED ORAL at 21:13

## 2024-05-22 RX ADMIN — OXYCODONE HYDROCHLORIDE 5 MILLIGRAM(S): 5 TABLET ORAL at 21:18

## 2024-05-22 RX ADMIN — HYDROMORPHONE HYDROCHLORIDE 0.2 MILLIGRAM(S): 2 INJECTION INTRAMUSCULAR; INTRAVENOUS; SUBCUTANEOUS at 18:24

## 2024-05-22 RX ADMIN — SODIUM CHLORIDE 150 MILLILITER(S): 9 INJECTION INTRAMUSCULAR; INTRAVENOUS; SUBCUTANEOUS at 20:11

## 2024-05-22 RX ADMIN — OXYCODONE HYDROCHLORIDE 5 MILLIGRAM(S): 5 TABLET ORAL at 14:52

## 2024-05-22 RX ADMIN — Medication 250 MILLIGRAM(S): at 02:13

## 2024-05-22 RX ADMIN — OXYCODONE HYDROCHLORIDE 5 MILLIGRAM(S): 5 TABLET ORAL at 02:11

## 2024-05-22 RX ADMIN — Medication 100 MILLIGRAM(S): at 04:39

## 2024-05-22 RX ADMIN — OXYCODONE HYDROCHLORIDE 5 MILLIGRAM(S): 5 TABLET ORAL at 10:48

## 2024-05-22 RX ADMIN — Medication 100 MILLIGRAM(S): at 12:15

## 2024-05-22 RX ADMIN — SODIUM CHLORIDE 150 MILLILITER(S): 9 INJECTION INTRAMUSCULAR; INTRAVENOUS; SUBCUTANEOUS at 02:13

## 2024-05-22 RX ADMIN — Medication 100 MILLIGRAM(S): at 02:12

## 2024-05-22 RX ADMIN — Medication 100 MICROGRAM(S): at 05:21

## 2024-05-22 NOTE — CONSULT NOTE ADULT - ASSESSMENT
ASSESSMENT  69-year-old female with PMHx of LE DVT(2019)on Eliquis, HTN, hypothyroidism, metastatic RCC(diagnosed about 5 yrs ago) s/p L nephrectomy, distal pancreatectomy and splenectomy currently not on chemo or radiationTx (follows with Dr. Voss at [facility]), Anxiety, presents to the ED with confusion, poor oral intake, generalized weakness and fall. ID consulted for PNA    IMPRESSION  #Sepsis ruled out on admission   #Admitted with Fall, overall low suspicion for bacterial PNA. Suspect POD    CT 1. Multiple bilateral pulmonary nodules are identified suspicious for   metastases. A nodule is noted in the right upper lobe measuring 13 x 7.2   mm. A nodule measuring 1.2 x 1.2 cm noted in the left upper lobe.   Multiple smaller nodules are noted within the left lung. Additional   right-sided nodules may be obscured by the large effusion and areas of   atelectasis.  2. There is a large right sided pleural effusion, including a large   loculation in the right mid and lower lung field measuring 6 x 6 x 5 cm.   There is a small left-sided pleural effusion.  #Transaminitis   #Met RCC on chemo  #Altered mental status, suspected mets to brain   #Hypercalcemia of malignancy   #Hx DVT on Eliquis  #Hx splenectomy not on antibiotics ppx   #PCN allergy- rash as child   Creatinine: 1.0 (05-22-24 @ 05:37)    Height (cm): 154.9 (05-21-24 @ 22:30)  Weight (kg): 52.8 (05-21-24 @ 22:30)    RECOMMENDATIONS  - D/C Vanc, flagyl, levaquin.   - Doubt PNA, doubt empyema, suspect POD. OK Ceftriaxone 1g q24h IV for now, aware of PCN allergy  - procalcitonin   - Hepatitis panel  - Trend WBC, LFTs  - Appreciate Pulm consult  - Grave prognosis, GOC     If any questions, please send a message or call on [employer]  Please continue to update ID with any pertinent new laboratory, radiographic findings, or change in clinical status

## 2024-05-22 NOTE — PATIENT PROFILE ADULT - FALL HARM RISK - HARM RISK INTERVENTIONS
Assistance with ambulation/Assistance OOB with selected safe patient handling equipment/Communicate Risk of Fall with Harm to all staff/Discuss with provider need for PT consult/Monitor gait and stability/Reinforce activity limits and safety measures with patient and family/Sit up slowly, dangle for a short time, stand at bedside before walking/Tailored Fall Risk Interventions/Visual Cue: Yellow wristband and red socks/Bed in lowest position, wheels locked, appropriate side rails in place/Call bell, personal items and telephone in reach/Instruct patient to call for assistance before getting out of bed or chair/Non-slip footwear when patient is out of bed/[place] to call system/Physically safe environment - no spills, clutter or unnecessary equipment/Purposeful Proactive Rounding/Room/bathroom lighting operational, light cord in reach

## 2024-05-22 NOTE — CONSULT NOTE ADULT - ATTENDING COMMENTS
Ms. Henderson was seen and examined at bedside today, pulmonary was consulted for evidence of a loculated right-sided pleural effusion in the setting of known metastatic renal cell carcinoma.  While this effusion most likely represents progressive cancer, at this time my discussion with the patient and her family indicates that she would be pursuing a more palliative approach, rather than pursuing any thoracentesis/chest tube.  Please reengage pulmonary should their goals of care change.      Please do not hesitate to recall the pulmonary service for further evaluation and recommendation if the patient's pulmonary symptoms increase or if there are further clinical questions.  We will sign off at this time.

## 2024-05-22 NOTE — CONSULT NOTE ADULT - PROBLEM SELECTOR RECOMMENDATION 9
In setting of hypercalcemia and suspected brain mets. Patient AAOx2-3 at time of visit  -IVF and treatment of hypercalcemia per primary team  -patient/family reportedly refused CT brain    Recommend non-pharmacological interventions to prevent/treat delirium  - maintain day/night light cycles  - optimize sleep-wake cycle, minimize environmental noise  - reorientation frequently  - use verbal redirection as first line  - minimize restraints and lines  - ensure good bladder/bowel function  - ensure adequate pain control  - minimize use of anticholinergic, antihistaminic, and benzodiazepine medications

## 2024-05-22 NOTE — CONSULT NOTE ADULT - SUBJECTIVE AND OBJECTIVE BOX
CC: confusion    HPI:  69-year-old female with PMHx of LE DVT(2019)on Eliquis, HTN, hypothyroidism, metastatic RCC(diagnosed about 5 yrs ago) s/p L nephrectomy, distal pancreatectomy and splenectomy currently not on chemo or radiationTx (follows with Dr. Voss at [facility]), Anxiety, presents to the ED with confusion, poor oral intake, generalized weakness and fall.     Talked to daughter in law(Arleth) over the phone, she states that patient has been increasingly confused over the last month. She gets episodes of confusion where she can't recognize her  and seems confused. Has also had increasing difficulty with ambulation and had a fall yesterday. States she was walking to her bathroom when she tripped and fell onto her forearm(bruises noted over the forearm), Denies any head trauma or injuries elsewhere, On Eliquis(last dose taken on 5/20 PM), denies LOC. On my exam pt. was AAOx3 answered all the questions appropriately, appeared comfortable. Patient states she is been feeling more tired with poor appetite and difficulty walking within the last couple of weeks. Pt. also c/o pain all over her body. Denies fevers, chills, SOB, chest pain, nausea, vomiting, diarrhea or urinary symptoms. Of note she was recently admitted to [facility] in March for B/L LE rash which is now improved with topical steroids.     Her chemotherapy(Inylta) was stopped by her oncologist few weeks ago as it was not working her. She takes oxycodone 5mg q4 prn for pain and per daughter in law she gets more confused after taking the pain meds.    In ED, her vitals were stable. satting 99% on room air  Labs significant for WBC 11.5K, Ca 12.1, , AST 45  CT head showed cerebral and cerebellar atrophy, mets could not be ruled out.  CT chest non-con:   1. Multiple bilateral pulmonary nodules are identified suspicious for metastases. A nodule is noted in the right upper lobe measuring 13 x 7.2 mm. A nodule measuring 1.2 x 1.2 cm noted in the left upper lobe. Multiple smaller nodules are noted within the left lung. Additional right-sided nodules may be obscured by the large effusion and areas of atelectasis.  2. There is a large right sided pleural effusion, including a large loculation in the right mid and lower lung field measuring 6 x 6 x 5 cm. There is a small left-sided pleural effusion.    s/p 1L NS bolus, Aztreonam and Levaquin in ED.    Vital Signs Last 24 Hrs  T(F): 95.6 (21 May 2024 13:22), Max: 95.6 (21 May 2024 13:22)  HR: 64 (21 May 2024 13:22) (64 - 64)  BP: 102/62 (21 May 2024 13:22) (102/62 - 102/62)  RR: 18 (21 May 2024 13:22) (18 - 18)  SpO2: 99% (21 May 2024 13:22) (99% - 99%) on room air    Pt. is being admitted to medicine for management of AMS, fall, PNA and pleural effusion.       (21 May 2024 20:56)    PERTINENT PM/SXH:   Renal cell carcinoma    Hypertension    Breast cancer    Deep vein thrombosis (DVT)      H/O left nephrectomy    H/O partial adrenalectomy    History of partial pancreatectomy    S/P splenectomy    H/O lumpectomy      FAMILY HISTORY:  None pertinent    ITEMS NOT CHECKED ARE NOT PRESENT    SOCIAL HISTORY:   Significant other/partner[ ]  Children[ ]  Mormonism/Spirituality:  Substance hx:  [ ]   Tobacco hx:  [ ]   Alcohol hx: [ ]   Living Situation: [ x]Home  [ ]Long term care  [ ]Rehab [ ]Other  Home Services: [ ] HHA [ ] Visting RN [ ] Hospice  Occupation:  Home Opioid hx:  [ ] Y [ ] N [ x] I-Stop Reference No:    Reference #: [id number]      Patient Demographic Information (PDI)       PDI	First Name	Last Name	Birth Date	Gender	Street Address	[facility]	Zip Code  BRYANNA Henderson	1955	Female	[address]	[address]    Prescription Information      PDI Filter:    PDI	Current Rx	Drug Type	Rx Written	Rx Dispensed	Drug	Quantity	Days Supply	Prescriber Name	Prescriber SHELBY #	Payment Method	Dispenser  A	N	O	04/11/2024	04/11/2024	oxycodone hcl (ir) 5 mg tablet	90	30	Trevor Voss	[id number]	Medicare	[id number]  A	N	O	03/16/2024	03/17/2024	tramadol hcl 50 mg tablet	15	5	Rajni Hawk	[id number]	Medicare	[id number]     ADVANCE DIRECTIVES:     [x ] Full Code [ ] DNR  MOLST  [ ]  Living Will  [ ]   DECISION MAKER(s):  [ ] Health Care Proxy(s)  [x ] Surrogate(s)  [ ] Guardian           Name(s): Phone Number(s):  Sid      BASELINE (I)ADL(s) (prior to admission):    [place]: [ ]Total  [ ] Moderate [ ]Dependent  Palliative Performance Status Version 2:         %    http://[facility].org/files/news/palliative_performance_scale_ppsv2.pdf    Allergies    fish (Unknown)  penicillins (Angioedema)    Intolerances    MEDICATIONS  (STANDING):  atorvastatin 40 milliGRAM(s) Oral at bedtime  aztreonam  IVPB 2000 milliGRAM(s) IV Intermittent every 8 hours  aztreonam  IVPB 2000 milliGRAM(s) IV Intermittent once  chlorhexidine 2% Cloths 1 Application(s) Topical daily  enoxaparin Injectable 40 milliGRAM(s) SubCutaneous every 24 hours  levothyroxine 100 MICROGram(s) Oral daily  metroNIDAZOLE  IVPB 500 milliGRAM(s) IV Intermittent every 8 hours  sodium chloride 0.9%. 1000 milliLiter(s) (150 mL/Hr) IV Continuous <Continuous>  vancomycin  IVPB 750 milliGRAM(s) IV Intermittent every 24 hours    MEDICATIONS  (PRN):  acetaminophen     Tablet .. 650 milliGRAM(s) Oral every 6 hours PRN Temp greater or equal to 38C (100.4F), Mild Pain (1 - 3)  oxyCODONE    IR 5 milliGRAM(s) Oral every 4 hours PRN Severe Pain (7 - 10)    PRESENT SYMPTOMS: [ ]Unable to obtain due to poor mentation   Source if other than patient:  [ ]Family   [ ]Team     Pain: [ ]yes [ ]no  ALL PAIN ASSESSMENT COMPONENTS WERE ASKED ABOUT UNLESS OTHERWISE NOTED  QOL impact -   Location -                    Aggravating factors -  Quality -  Radiation -  Timing-  Severity (0-10 scale):  Minimal acceptable level (0-10 scale):     CPOT:    https://www.[facility].org/getattachment/for55g43-2b6r-6p1t-6y1d-2066w2118q9y/Critical-Care-Pain-Observation-Tool-(CPOT)    PAIN AD Score:   http://geriatrictoolkit.[facility]/cog/painad.pdf (press ctrl +  left click to view)    Dyspnea:                           [ ]None[ ]Mild [ ]Moderate [ ]Severe     Respiratory Distress Observation Scale (RDOS):   A score of 0 to 2 signifies little or no respiratory distress, 3 signifies mild distress, scores 4 to 6 indicate moderate distress, and scores greater than 7 signify severe distress  https://www.[facility].ca/sites/default/files/PDFS/131726-tbfvkdlsvyt-giqzhblv-zqudkafonyt-gfido.pdf    Anxiety:                             [ ]None[ ]Mild [ ]Moderate [ ]Severe   Fatigue:                             [ ]None[ ]Mild [ ]Moderate [ ]Severe   Nausea:                             [ ]None[ ]Mild [ ]Moderate [ ]Severe   Loss of appetite:              [ ]None[ ]Mild [ ]Moderate [ ]Severe   Constipation:                    [ ]None[ ]Mild [ ]Moderate [ ]Severe    Other Symptoms:  [ ]All other review of systems negative     Palliative Performance Status Version 2:         %    http://[facility].org/files/news/palliative_performance_scale_ppsv2.pdf    PHYSICAL EXAM:  Vital Signs Last 24 Hrs  T(C): 36.6 (22 May 2024 04:54), Max: 36.8 (21 May 2024 22:30)  T(F): 97.8 (22 May 2024 04:54), Max: 98.3 (21 May 2024 22:30)  HR: 85 (22 May 2024 04:54) (64 - 89)  BP: 98/59 (22 May 2024 04:54) (98/59 - 128/65)  BP(mean): --  RR: 18 (22 May 2024 04:54) (18 - 18)  SpO2: 93% (22 May 2024 04:54) (91% - 99%)    Parameters below as of 21 May 2024 22:30  Patient On (Oxygen Delivery Method): room air     I&O's Summary      GENERAL:  [ ] No acute distress [ ]Lethargic  [ ]Unarousable  [ ]Verbal  [ ]Non-Verbal [ ]Cachexia    BEHAVIORAL/PSYCH:  [ ]Alert and Oriented x  [ ] Anxiety [ ] Delirium [ ] Agitation [ ] Calm   EYES: [ ] No scleral icterus [ ] Scleral icterus [ ] Closed  ENMT:  [ ]Dry mouth  [ ]No external oral lesions [ ] No external ear or nose lesions  CARDIOVASCULAR:  [ ]Regular [ ]Irregular [ ]Tachy [ ]Not Tachy  [ ]Ludwig [ ] Edema [ ] No edema  PULMONARY:  [ ]Tachypnea  [ ]Audible excessive secretions [ ] No labored breathing [ ] labored breathing  GASTROINTESTINAL: [ ]Soft  [ ]Distended  [ ]Not distended [ ]Non tender [ ]Tender  MUSCULOSKELETAL: [ ]No clubbing [ ] clubbing  [ ] No cyanosis [ ] cyanosis  NEUROLOGIC: [ ]No focal deficits  [ ]Follows commands  [ ]Does not follow commands  [ ]Cognitive impairment  [ ]Dysphagia  [ ]Dysarthria  [ ]Paresis   SKIN: [ ] Jaundiced [ ] Non-jaundiced [ ]Rash [ ]No Rash [ ] Warm [ ] Dry  MISC/LINES: [ ] ET tube [ ] Trach [ ]NGT/OGT [ ]PEG [ ]George    LABS: reviewed by me                        8.7    10.84 )-----------( 450      ( 22 May 2024 05:37 )             28.6   05-22    135  |  99  |  18  ----------------------------<  101<H>  5.0   |  26  |  1.0    Ca    10.2      22 May 2024 05:37  Mg     2.2     05-22    TPro  6.1  /  Alb  2.5<L>  /  TBili  0.3  /  DBili  x   /  AST  29  /  ALT  23  /  AlkPhos  247<H>  05-22  PT/INR - ( 22 May 2024 05:37 )   PT: 22.00 sec;   INR: 1.92 ratio         PTT - ( 22 May 2024 05:37 )  PTT:37.7 sec    Urinalysis Basic - ( 22 May 2024 05:37 )    Color: x / Appearance: x / SG: x / pH: x  Gluc: 101 mg/dL / Ketone: x  / Bili: x / Urobili: x   Blood: x / Protein: x / Nitrite: x   Leuk Esterase: x / RBC: x / WBC x   Sq Epi: x / Non Sq Epi: x / Bacteria: x      RADIOLOGY & ADDITIONAL STUDIES: reviewed by me    EKG: reviewed by me      PROTEIN CALORIE MALNUTRITION PRESENT: [ ]mild [ ]moderate [ ]severe [ ]underweight [ ]morbid obesity  https://www.andeal.org/vault/9114/web/files/ONC/Table_Clinical%20Characteristics%20to%20Document%20Malnutrition-White%20JV%20et%20al%202012.pdf    Height (cm): 154.9 (05-21-24 @ 22:30)  Weight (kg): 52.8 (05-21-24 @ 22:30), 45.8 (03-12-24 @ 12:28)  BMI (kg/m2): 22 (05-21-24 @ 22:30)  [ ]PPSV2 < or = to 30% [ ]significant weight loss  [ ]poor nutritional intake  [ ]anasarca      [ ]Artificial Nutrition      Palliative Care Spiritual/Emotional Screening Tool Question  Severity (0-4):                    OR                    [ x] Unable to determine. Will assess at later time if appropriate.  Score of 2 or greater indicates recommendation of Chaplaincy and/or SW referral  Chaplaincy Referral: [ ] Yes [ ] Refused [ ] Following     Caregiver [place]:  [ ] Yes [ ] No    OR    [x ] Unable to determine. Will assess at later time if appropriate.  Social Work Referral [ ]  Patient and Family Centered Care Referral [ ]    Anticipatory Grief Present: [ ] Yes [ ] No    OR     [ x] Unable to determine. Will assess at later time if appropriate.  Social Work Referral [ ]  Patient and Family Centered Care Referral [ ]    Patient discussed with primary medical team MD  Palliative care education provided to patient and/or family   CC: confusion    HPI:  69-year-old female with PMHx of LE DVT(2019)on Eliquis, HTN, hypothyroidism, metastatic RCC(diagnosed about 5 yrs ago) s/p L nephrectomy, distal pancreatectomy and splenectomy currently not on chemo or radiationTx (follows with Dr. Voss at [facility]), Anxiety, presents to the ED with confusion, poor oral intake, generalized weakness and fall.     Talked to daughter in law(Arleth) over the phone, she states that patient has been increasingly confused over the last month. She gets episodes of confusion where she can't recognize her  and seems confused. Has also had increasing difficulty with ambulation and had a fall yesterday. States she was walking to her bathroom when she tripped and fell onto her forearm(bruises noted over the forearm), Denies any head trauma or injuries elsewhere, On Eliquis(last dose taken on 5/20 PM), denies LOC. On my exam pt. was AAOx3 answered all the questions appropriately, appeared comfortable. Patient states she is been feeling more tired with poor appetite and difficulty walking within the last couple of weeks. Pt. also c/o pain all over her body. Denies fevers, chills, SOB, chest pain, nausea, vomiting, diarrhea or urinary symptoms. Of note she was recently admitted to [facility] in March for B/L LE rash which is now improved with topical steroids.     Her chemotherapy(Inylta) was stopped by her oncologist few weeks ago as it was not working her. She takes oxycodone 5mg q4 prn for pain and per daughter in law she gets more confused after taking the pain meds.    In ED, her vitals were stable. satting 99% on room air  Labs significant for WBC 11.5K, Ca 12.1, , AST 45  CT head showed cerebral and cerebellar atrophy, mets could not be ruled out.  CT chest non-con:   1. Multiple bilateral pulmonary nodules are identified suspicious for metastases. A nodule is noted in the right upper lobe measuring 13 x 7.2 mm. A nodule measuring 1.2 x 1.2 cm noted in the left upper lobe. Multiple smaller nodules are noted within the left lung. Additional right-sided nodules may be obscured by the large effusion and areas of atelectasis.  2. There is a large right sided pleural effusion, including a large loculation in the right mid and lower lung field measuring 6 x 6 x 5 cm. There is a small left-sided pleural effusion.    s/p 1L NS bolus, Aztreonam and Levaquin in ED.    Vital Signs Last 24 Hrs  T(F): 95.6 (21 May 2024 13:22), Max: 95.6 (21 May 2024 13:22)  HR: 64 (21 May 2024 13:22) (64 - 64)  BP: 102/62 (21 May 2024 13:22) (102/62 - 102/62)  RR: 18 (21 May 2024 13:22) (18 - 18)  SpO2: 99% (21 May 2024 13:22) (99% - 99%) on room air    Pt. is being admitted to medicine for management of AMS, fall, PNA and pleural effusion.       (21 May 2024 20:56)    PERTINENT PM/SXH:   Renal cell carcinoma    Hypertension    Breast cancer    Deep vein thrombosis (DVT)      H/O left nephrectomy    H/O partial adrenalectomy    History of partial pancreatectomy    S/P splenectomy    H/O lumpectomy      FAMILY HISTORY:  None pertinent    ITEMS NOT CHECKED ARE NOT PRESENT    SOCIAL HISTORY:   Significant other/partner[ ]  Children[ ]  Jew/Spirituality:  Substance hx:  [ ]   Tobacco hx:  [ ]   Alcohol hx: [ ]   Living Situation: [ x]Home  [ ]Long term care  [ ]Rehab [ ]Other  Home Services: [ ] HHA [ ] Visting RN [ ] Hospice  Occupation:  Home Opioid hx:  [ ] Y [ ] N [ x] I-Stop Reference No:    Reference #: [id number]      Patient Demographic Information (PDI)       PDI	First Name	Last Name	Birth Date	Gender	Street Address	[facility]	Zip Code  BRYANNA Henderson	1955	Female	[address]	[address]    Prescription Information      PDI Filter:    PDI	Current Rx	Drug Type	Rx Written	Rx Dispensed	Drug	Quantity	Days Supply	Prescriber Name	Prescriber SHELBY #	Payment Method	Dispenser  A	N	O	04/11/2024	04/11/2024	oxycodone hcl (ir) 5 mg tablet	90	30	Trevor Voss	[id number]	Medicare	[id number]  A	N	O	03/16/2024	03/17/2024	tramadol hcl 50 mg tablet	15	5	Rajni Hawk	[id number]	Medicare	[id number]     ADVANCE DIRECTIVES:     [x ] Full Code [ ] DNR  MOLST  [ ]  Living Will  [ ]   DECISION MAKER(s):  [ ] Health Care Proxy(s)  [x ] Surrogate(s)  [ ] Guardian           Name(s): Phone Number(s):  Sid      BASELINE (I)ADL(s) (prior to admission):    [place]: [ ]Total  [ ] Moderate [ ]Dependent  Palliative Performance Status Version 2:         %    http://[facility].org/files/news/palliative_performance_scale_ppsv2.pdf    Allergies    fish (Unknown)  penicillins (Angioedema)    Intolerances    MEDICATIONS  (STANDING):  atorvastatin 40 milliGRAM(s) Oral at bedtime  aztreonam  IVPB 2000 milliGRAM(s) IV Intermittent every 8 hours  aztreonam  IVPB 2000 milliGRAM(s) IV Intermittent once  chlorhexidine 2% Cloths 1 Application(s) Topical daily  enoxaparin Injectable 40 milliGRAM(s) SubCutaneous every 24 hours  levothyroxine 100 MICROGram(s) Oral daily  metroNIDAZOLE  IVPB 500 milliGRAM(s) IV Intermittent every 8 hours  sodium chloride 0.9%. 1000 milliLiter(s) (150 mL/Hr) IV Continuous <Continuous>  vancomycin  IVPB 750 milliGRAM(s) IV Intermittent every 24 hours    MEDICATIONS  (PRN):  acetaminophen     Tablet .. 650 milliGRAM(s) Oral every 6 hours PRN Temp greater or equal to 38C (100.4F), Mild Pain (1 - 3)  oxyCODONE    IR 5 milliGRAM(s) Oral every 4 hours PRN Severe Pain (7 - 10)    PRESENT SYMPTOMS: [ ]Unable to obtain due to poor mentation   Source if other than patient:  [ ]Family   [ ]Team     Pain: [x ]yes [ ]no  ALL PAIN ASSESSMENT COMPONENTS WERE ASKED ABOUT UNLESS OTHERWISE NOTED  QOL impact -  moderate  Location -      abdomen              Aggravating factors - none given  Quality - none given  Radiation - none given  Timing- none given  Severity (0-10 scale):none given  Minimal acceptable level (0-10 scale):  none given    CPOT:    https://www.[facility].org/getattachment/tjj29n28-1o2j-1n4u-0s5e-3168o3024t6g/Critical-Care-Pain-Observation-Tool-(CPOT)    PAIN AD Score:   http://geriatrictoolkit.[facility]/cog/painad.pdf (press ctrl +  left click to view)    Dyspnea:                           [ x]None[ ]Mild [ ]Moderate [ ]Severe     Respiratory Distress Observation Scale (RDOS):   A score of 0 to 2 signifies little or no respiratory distress, 3 signifies mild distress, scores 4 to 6 indicate moderate distress, and scores greater than 7 signify severe distress  https://www.[facility].ca/sites/default/files/PDFS/144790-phrzgeprtsr-ohkibelj-ddwdnjoecff-dkntz.pdf    Anxiety:                             [x ]None[ ]Mild [ ]Moderate [ ]Severe   Fatigue:                             [x ]None[ ]Mild [ ]Moderate [ ]Severe   Nausea:                             [x ]None[ ]Mild [ ]Moderate [ ]Severe   Loss of appetite:              [ x]None[ ]Mild [ ]Moderate [ ]Severe   Constipation:                    [x ]None[ ]Mild [ ]Moderate [ ]Severe    Other Symptoms:  [x ]All other review of systems negative     Palliative Performance Status Version 2:         30%    http://[facility].org/files/news/palliative_performance_scale_ppsv2.pdf    PHYSICAL EXAM:  Vital Signs Last 24 Hrs  T(C): 36.6 (22 May 2024 04:54), Max: 36.8 (21 May 2024 22:30)  T(F): 97.8 (22 May 2024 04:54), Max: 98.3 (21 May 2024 22:30)  HR: 85 (22 May 2024 04:54) (64 - 89)  BP: 98/59 (22 May 2024 04:54) (98/59 - 128/65)  BP(mean): --  RR: 18 (22 May 2024 04:54) (18 - 18)  SpO2: 93% (22 May 2024 04:54) (91% - 99%)    Parameters below as of 21 May 2024 22:30  Patient On (Oxygen Delivery Method): room air     I&O's Summary      GENERAL:  [ ] No acute distress [ ]Lethargic  [ ]Unarousable  [ c]Verbal  [ ]Non-Verbal [ ]Cachexia    BEHAVIORAL/PSYCH:  [c ]Alert and Oriented x2-3  [ ] Anxiety [ ] Delirium [ ] Agitation [x ] Calm   EYES: [x ] No scleral icterus [ ] Scleral icterus [ ] Closed  ENMT:  [ ]Dry mouth  [ x]No external oral lesions [ ] No external ear or nose lesions  CARDIOVASCULAR:  [ ]Regular [ ]Irregular [ ]Tachy [x ]Not Tachy  [ ]Ludwig [ ] Edema [ ] No edema  PULMONARY:  [ ]Tachypnea  [ ]Audible excessive secretions [ x] No labored breathing [ ] labored breathing  GASTROINTESTINAL: [ ]Soft  [ ]Distended  [ ]Not distended [ ]Non tender [ ]Tender  MUSCULOSKELETAL: [ ]No clubbing [ ] clubbing  [ ] No cyanosis [ ] cyanosis  NEUROLOGIC: [ ]No focal deficits  [x ]Follows commands  [ ]Does not follow commands  [ ]Cognitive impairment  [ ]Dysphagia  [ ]Dysarthria  [ ]Paresis   SKIN: [ ] Jaundiced [ x] Non-jaundiced [ ]Rash [ ]No Rash [ ] Warm [ ] Dry  MISC/LINES: [ ] ET tube [ ] Trach [ ]NGT/OGT [ ]PEG [ ]George    LABS: reviewed by me                        8.7    10.84 )-----------( 450      ( 22 May 2024 05:37 )             28.6   05-22    135  |  99  |  18  ----------------------------<  101<H>  5.0   |  26  |  1.0    Ca    10.2      22 May 2024 05:37  Mg     2.2     05-22    TPro  6.1  /  Alb  2.5<L>  /  TBili  0.3  /  DBili  x   /  AST  29  /  ALT  23  /  AlkPhos  247<H>  05-22  PT/INR - ( 22 May 2024 05:37 )   PT: 22.00 sec;   INR: 1.92 ratio         PTT - ( 22 May 2024 05:37 )  PTT:37.7 sec    Urinalysis Basic - ( 22 May 2024 05:37 )    Color: x / Appearance: x / SG: x / pH: x  Gluc: 101 mg/dL / Ketone: x  / Bili: x / Urobili: x   Blood: x / Protein: x / Nitrite: x   Leuk Esterase: x / RBC: x / WBC x   Sq Epi: x / Non Sq Epi: x / Bacteria: x      RADIOLOGY & ADDITIONAL STUDIES: reviewed by me    < from: Xray Chest 1 View- PORTABLE-Routine (Xray Chest 1 View- PORTABLE-Routine in AM.) (05.22.24 @ 04:43) >  Impression:    Large right-sided pleural effusion. Multiple pulmonary nodules. Please   see CT scan from yesterday.    Unchanged.    < end of copied text >      EKG: reviewed by me    < from: 12 Lead ECG (05.21.24 @ 17:16) >  Ventricular Rate 81 BPM    Atrial Rate 81 BPM    P-R Interval 148 ms    QRS Duration 82 ms    Q-T Interval 368 ms    QTC Calculation(Bazett) 427 ms    P Axis 47 degrees    R Axis 47 degrees    T Axis 44 degrees    Diagnosis Line Normal sinus rhythm  Normal ECG    < end of copied text >      PROTEIN CALORIE MALNUTRITION PRESENT: [ ]mild [ ]moderate [ ]severe [ ]underweight [ ]morbid obesity  https://www.andeal.org/vault/2440/web/files/ONC/Table_Clinical%20Characteristics%20to%20Document%20Malnutrition-White%20JV%20et%20al%858860.pdf    Height (cm): 154.9 (05-21-24 @ 22:30)  Weight (kg): 52.8 (05-21-24 @ 22:30), 45.8 (03-12-24 @ 12:28)  BMI (kg/m2): 22 (05-21-24 @ 22:30)  [ ]PPSV2 < or = to 30% [ ]significant weight loss  [ ]poor nutritional intake  [ ]anasarca      [ ]Artificial Nutrition      Palliative Care Spiritual/Emotional Screening Tool Question  Severity (0-4):                    OR                    [ x] Unable to determine. Will assess at later time if appropriate.  Score of 2 or greater indicates recommendation of Chaplaincy and/or SW referral  Chaplaincy Referral: [ ] Yes [ ] Refused [ ] Following     Caregiver [place]:  [ ] Yes [ ] No    OR    [x ] Unable to determine. Will assess at later time if appropriate.  Social Work Referral [ ]  Patient and Family Centered Care Referral [ ]    Anticipatory Grief Present: [ ] Yes [ ] No    OR     [ x] Unable to determine. Will assess at later time if appropriate.  Social Work Referral [ ]  Patient and Family Centered Care Referral [ ]    Patient discussed with primary medical team MD  Palliative care education provided to patient and/or family

## 2024-05-22 NOTE — CONSULT NOTE ADULT - ASSESSMENT
69-year-old female with PMHx of LE DVT(2019)on Eliquis, HTN, hypothyroidism, metastatic RCC(diagnosed about 5 yrs ago) s/p L nephrectomy, distal pancreatectomy and splenectomy currently not on chemo or radiationTx (follows with Dr. Voss at [facility]), Anxiety, presents to the ED with confusion, poor oral intake, generalized weakness and fall. Palliative care consulted for GOC.    Spoke with patient and sister at bedside. Palliative care introduced. Sister was able to provide a medical history and hospital course. Patient was lethargic but able to answer questions regarding her care.  Christin noted the patient was no longer being offered treatment and they had discussed hospice previously. We discussed hospice, comfort measures only, and DNR/DNI at length.  Christin also spoke with the rest of the patient's family following our initial discussion.    Patient and family wish for her to be DNR/DNI.  Hospice consult will be placed and will discuss CMO further after the discussion with hospice.    MEDD (morphine equivalent daily dose):    Education about palliative care provided to patient/family.  See Recs below.    Please call [phone] with questions or concerns 24/7.   We will continue to follow.

## 2024-05-22 NOTE — PROGRESS NOTE ADULT - ASSESSMENT
69-year-old female with PMHx of LE DVT(2019)on Eliquis, HTN, hypothyroidism, metastatic RCC(diagnosed about 5 yrs ago) s/p L nephrectomy, distal pancreatectomy and splenectomy currently not on chemo or radiationTx (follows with Dr. Voss at [facility]), Anxiety, presents to the ED with confusion, poor oral intake, generalized weakness and fall.     Talked to sister at length at bedside today 05/22/24.   Patient has been increasingly confused over the last month. She gets episodes of confusion where she can't recognize her  and seems confused. Has also had increasing difficulty with ambulation and had a fall yesterday. States she was walking to her bathroom when she tripped and fell onto her forearm(bruises noted over the forearm), Denies any head trauma or injuries elsewhere, On Eliquis(last dose taken on 5/20 PM), denies LOC. On my exam pt. was AAOx3 answered all the questions appropriately, appeared comfortable. Patient states she is been feeling more tired with poor appetite and difficulty walking within the last couple of weeks. Pt. also c/o pain all over her body. Denies fevers, chills, SOB, chest pain, nausea, vomiting, diarrhea or urinary symptoms. Of note she was recently admitted to [facility] in March for B/L LE rash which is now improved with topical steroids.     Her chemotherapy(Inylta) was stopped by her oncologist few weeks ago as it was not working her. She takes oxycodone 5mg q4 prn for pain and per daughter in law she gets more confused after taking the pain meds.    CT head showed cerebral and cerebellar atrophy, mets could not be ruled out.  CT chest non-con:   1. Multiple bilateral pulmonary nodules are identified suspicious for metastases. A nodule is noted in the right upper lobe measuring 13 x 7.2 mm. A nodule measuring 1.2 x 1.2 cm noted in the left upper lobe. Multiple smaller nodules are noted within the left lung. Additional right-sided nodules may be obscured by the large effusion and areas of atelectasis.  2. There is a large right sided pleural effusion, including a large loculation in the right mid and lower lung field measuring 6 x 6 x 5 cm. There is a small left-sided pleural effusion.    s/p 1L NS bolus, Aztreonam and Levaquin in ED.    A/p  Progressive confusion -suspected mets to brain in patient w/ RCC r/o TME r/o seizures r/o infection  -Patient will need CT imaging w/ contrast (patient and family refusing )  Large R pleural effusion and small L pleural effusion- most likely malignancy related r/o empyema   s/p fall    -rEEG   -agree w/ holding Eliquis for now   -CT S eval / Pulm eval   -IV abx  >> De escalate as appropriate.   - check blood Cx and repeat WBC     Hypercalcemia most likely 2/2 malignancy   -agree w/ iCa / PTH/ PTHrP / Vit D  -IV fluids   -hold Ca/Vit D    Metastatic RCC   -CT imaging w/ contrast   -Hem/Onc eval   -Palliative consult     Hypothyroidism  Dyslipidemia    -c/w levothyroxine and check TSH   -statin   Anxiety   -lexapro  DVT prophylaxis - lovenox until procedure     Reviewed labs and imaging independently.     Pending: Palliative/ GOC  Plan d/w the sister at bedside.   Dispo: TBD

## 2024-05-22 NOTE — CONSULT NOTE ADULT - PROBLEM SELECTOR RECOMMENDATION 2
-No additional treatment per family/patient  -f/u heme onc  -hospice consult  -now DNR/DNI  -ongoing GOC

## 2024-05-22 NOTE — CONSULT NOTE ADULT - CONVERSATION DETAILS
Spoke with patient and sister at bedside. Palliative care introduced. Sister was able to provide a medical history and hospital course. Patient was lethargic but able to answer questions regarding her care.  Christin noted the patient was no longer being offered treatment and they had discussed hospice previously. We discussed hospice, comfort measures only, and DNR/DNI at length.  Christin also spoke with the rest of the patient's family following our initial discussion.    Patient and family wish for her to be DNR/DNI.  Hospice consult will be placed and will discuss CMO further after the discussion with hospice.

## 2024-05-22 NOTE — CONSULT NOTE ADULT - ASSESSMENT
Impression:    #Large R sided pleural effusion with loculation in R mid and lower lung, likely malignancy-related empyema   #Multiple pulmonary nodules, likely mets from RCC  #Metastatic RCC  #Altered mental status, suspected mets to brain   #Hypercalcemia of malignancy   #Hx DVT on Eliquis    Plan:  -CXR and CT chest reviewed  -Will follow echo report   -Abx as per ID  -Will follow palliative consult and GOC recommendations   Impression:    #Large R sided pleural effusion with loculation in R mid and lower lung, likely malignancy-related effusion  #Multiple pulmonary nodules, likely mets from RCC  #Metastatic RCC  #Altered mental status, suspected mets to brain   #Hypercalcemia of malignancy   #Hx DVT on Eliquis    Plan:  -CXR and CT chest reviewed  -Will follow echo report   -Abx as per ID  -Will follow palliative consult and GOC recommendations   Impression:    #Large R sided pleural effusion with loculation in R mid and lower lung, likely malignancy-related effusion  #Multiple pulmonary nodules, likely mets from RCC  #Metastatic RCC  #Altered mental status, suspected mets to brain   #Hypercalcemia of malignancy   #Hx DVT on Eliquis    Plan:  -Abx as per ID  -Follow palliative consult and GOC recommendations  -Please recall pulmonary if patient's GOC changes to include pleural intervention

## 2024-05-22 NOTE — CONSULT NOTE ADULT - SUBJECTIVE AND OBJECTIVE BOX
Patient is a 69y old  Female who presents with a chief complaint of confusion, fall.     HPI:  69-year-old female with PMHx of LE DVT(2019)on Eliquis, HTN, hypothyroidism, metastatic RCC(diagnosed about 5 yrs ago) s/p L nephrectomy, distal pancreatectomy and splenectomy currently not on chemo or radiationTx (follows with Dr. Voss at [facility]), Anxiety, presents to the ED with confusion, poor oral intake, generalized weakness and fall.     Talked to daughter in law(Arleth) over the phone, she states that patient has been increasingly confused over the last month. She gets episodes of confusion where she can't recognize her  and seems confused. Has also had increasing difficulty with ambulation and had a fall yesterday. States she was walking to her bathroom when she tripped and fell onto her forearm(bruises noted over the forearm), Denies any head trauma or injuries elsewhere, On Eliquis(last dose taken on 5/20 PM), denies LOC. On my exam pt. was AAOx3 answered all the questions appropriately, appeared comfortable. Patient states she is been feeling more tired with poor appetite and difficulty walking within the last couple of weeks. Pt. also c/o pain all over her body. Denies fevers, chills, SOB, chest pain, nausea, vomiting, diarrhea or urinary symptoms. Of note she was recently admitted to [facility] in March for B/L LE rash which is now improved with topical steroids.     Her chemotherapy(Inylta) was stopped by her oncologist few weeks ago as it was not working her. She takes oxycodone 5mg q4 prn for pain and per daughter in law she gets more confused after taking the pain meds.    In ED, her vitals were stable. satting 99% on room air  Labs significant for WBC 11.5K, Ca 12.1, , AST 45  CT head showed cerebral and cerebellar atrophy, mets could not be ruled out.  CT chest non-con:   1. Multiple bilateral pulmonary nodules are identified suspicious for metastases. A nodule is noted in the right upper lobe measuring 13 x 7.2 mm. A nodule measuring 1.2 x 1.2 cm noted in the left upper lobe. Multiple smaller nodules are noted within the left lung. Additional right-sided nodules may be obscured by the large effusion and areas of atelectasis.  2. There is a large right sided pleural effusion, including a large loculation in the right mid and lower lung field measuring 6 x 6 x 5 cm. There is a small left-sided pleural effusion.    s/p 1L NS bolus, Aztreonam and Levaquin in ED.    Vital Signs Last 24 Hrs  T(F): 95.6 (21 May 2024 13:22), Max: 95.6 (21 May 2024 13:22)  HR: 64 (21 May 2024 13:22) (64 - 64)  BP: 102/62 (21 May 2024 13:22) (102/62 - 102/62)  RR: 18 (21 May 2024 13:22) (18 - 18)  SpO2: 99% (21 May 2024 13:22) (99% - 99%) on room air    Pt. is being admitted to medicine for management of AMS, fall, PNA and pleural effusion.    Pt evaluated at bedside today and history obtained from sister. As per sister, the pt has become more confused, weak and anxious since the chemo was stopped 4 weeks ago. Pt denies SOB or chest pain at this time.     PAST MEDICAL & SURGICAL HISTORY:  Renal cell carcinoma  Hypertension  Breast cancer  Deep vein thrombosis (DVT)  H/O left nephrectomy  H/O partial adrenalectomy  History of partial pancreatectomy  S/P splenectomy  H/O lumpectomy      SOCIAL HX:  non smoker, non drinker  FAMILY HISTORY:  .  No cardiovascular or pulmonary family history     REVIEW OF SYSTEMS:    All ROS are negative exept per HPI     Allergies    fish (Unknown)  penicillins (Angioedema)      PHYSICAL EXAM  Vital Signs Last 24 Hrs  T(C): 36.6 (22 May 2024 04:54), Max: 36.8 (21 May 2024 22:30)  T(F): 97.8 (22 May 2024 04:54), Max: 98.3 (21 May 2024 22:30)  HR: 85 (22 May 2024 04:54) (64 - 89)  BP: 98/59 (22 May 2024 04:54) (98/59 - 128/65)  BP(mean): --  RR: 18 (22 May 2024 04:54) (18 - 18)  SpO2: 93% (22 May 2024 04:54) (91% - 99%)    Parameters below as of 21 May 2024 22:30  Patient On (Oxygen Delivery Method): room air        CONSTITUTIONAL:  appears in acute distress, frail, weak    ENT:   Airway patent,       EYES:   Clear bilaterally,   pupils equal,   round and reactive to light.    CARDIAC:   Normal rate,   regular rhythm.    no edema    RESPIRATORY:   decreased breath sounds in R lower lung posteriorly  No wheezing   Normal chest expansion  Not tachypneic,  No use of accessory muscles    GASTROINTESTINAL:  Abdomen soft, non-tender,   No guarding,       MUSCULOSKELETAL:   Range of motion is not limited,  No clubbing, cyanosis    NEUROLOGICAL:   Alert and oriented   No motor deficits.    SKIN:   Skin normal color for race      LABS:                          8.7    10.84 )-----------( 450      ( 22 May 2024 05:37 )             28.6                                               05-22    135  |  99  |  18  ----------------------------<  101<H>  5.0   |  26  |  1.0    Ca    10.2      22 May 2024 05:37  Mg     2.2     05-22    TPro  6.1  /  Alb  2.5<L>  /  TBili  0.3  /  DBili  x   /  AST  29  /  ALT  23  /  AlkPhos  247<H>  05-22      PT/INR - ( 22 May 2024 05:37 )   PT: 22.00 sec;   INR: 1.92 ratio         PTT - ( 22 May 2024 05:37 )  PTT:37.7 sec                                       Urinalysis Basic - ( 22 May 2024 05:37 )    Color: x / Appearance: x / SG: x / pH: x  Gluc: 101 mg/dL / Ketone: x  / Bili: x / Urobili: x   Blood: x / Protein: x / Nitrite: x   Leuk Esterase: x / RBC: x / WBC x   Sq Epi: x / Non Sq Epi: x / Bacteria: x                                              LIVER FUNCTIONS - ( 22 May 2024 05:37 )  Alb: 2.5 g/dL / Pro: 6.1 g/dL / ALK PHOS: 247 U/L / ALT: 23 U/L / AST: 29 U/L / GGT: x                                                                                             MEDICATIONS  (STANDING):  atorvastatin 40 milliGRAM(s) Oral at bedtime  aztreonam  IVPB 2000 milliGRAM(s) IV Intermittent once  aztreonam  IVPB 2000 milliGRAM(s) IV Intermittent every 8 hours  chlorhexidine 2% Cloths 1 Application(s) Topical daily  enoxaparin Injectable 40 milliGRAM(s) SubCutaneous every 24 hours  levothyroxine 100 MICROGram(s) Oral daily  metroNIDAZOLE  IVPB 500 milliGRAM(s) IV Intermittent every 8 hours  sodium chloride 0.9%. 1000 milliLiter(s) (150 mL/Hr) IV Continuous <Continuous>  vancomycin  IVPB 750 milliGRAM(s) IV Intermittent every 24 hours    MEDICATIONS  (PRN):  acetaminophen     Tablet .. 650 milliGRAM(s) Oral every 6 hours PRN Temp greater or equal to 38C (100.4F), Mild Pain (1 - 3)  oxyCODONE    IR 5 milliGRAM(s) Oral every 4 hours PRN Severe Pain (7 - 10)      xray Chest 1 View- PORTABLE-Routine (Xray Chest 1 View- PORTABLE-Routine in AM.) (05.22.24 @ 04:43)   Impression:    Large right-sided pleural effusion. Multiple pulmonary nodules. Please   see CT scan from yesterday.    Unchanged.         CT Chest No Cont (05.21.24 @ 15:51)  IMPRESSION:    1. Multiple bilateral pulmonary nodules are identified suspicious for   metastases. A nodule is noted in the right upper lobe measuring 13 x 7.2   mm. A nodule measuring 1.2 x 1.2 cm noted in the left upper lobe.   Multiple smaller nodules are noted within the left lung. Additional   right-sided nodules may be obscured by the large effusion and areas of   atelectasis.    2. There is a large right sided pleural effusion, including a large   loculation in the right mid and lower lung field measuring 6 x 6 x 5 cm.   There is a small left-sided pleural effusion.

## 2024-05-22 NOTE — PATIENT PROFILE ADULT - ...
Tested positive for Covid Easter day but not improving. Non productive cough, throat feels coated with mucous.  Used mucinex which caused increased coughing.  \"sick of being sick\"    Visit Vitals  BP (!) 182/90   Pulse 74   Temp 97.5 °F (36.4 °C) (Tympanic)   Resp 18   Wt 62.1 kg (137 lb)   SpO2 97%   BMI 24.27 kg/m²        22-May-2024 00:43:58

## 2024-05-22 NOTE — CONSULT NOTE ADULT - SUBJECTIVE AND OBJECTIVE BOX
PARUL SOTO  69y, Female  Allergy: fish (Unknown)  penicillins (Angioedema)      CHIEF COMPLAINT:       LOS  1d    HPI  HPI:  69-year-old female with PMHx of LE DVT(2019)on Eliquis, HTN, hypothyroidism, metastatic RCC(diagnosed about 5 yrs ago) s/p L nephrectomy, distal pancreatectomy and splenectomy currently not on chemo or radiationTx (follows with Dr. Voss at [facility]), Anxiety, presents to the ED with confusion, poor oral intake, generalized weakness and fall.     Talked to daughter in law(Arleth) over the phone, she states that patient has been increasingly confused over the last month. She gets episodes of confusion where she can't recognize her  and seems confused. Has also had increasing difficulty with ambulation and had a fall yesterday. States she was walking to her bathroom when she tripped and fell onto her forearm(bruises noted over the forearm), Denies any head trauma or injuries elsewhere, On Eliquis(last dose taken on 5/20 PM), denies LOC. On my exam pt. was AAOx3 answered all the questions appropriately, appeared comfortable. Patient states she is been feeling more tired with poor appetite and difficulty walking within the last couple of weeks. Pt. also c/o pain all over her body. Denies fevers, chills, SOB, chest pain, nausea, vomiting, diarrhea or urinary symptoms. Of note she was recently admitted to [facility] in March for B/L LE rash which is now improved with topical steroids.     Her chemotherapy(Inylta) was stopped by her oncologist few weeks ago as it was not working her. She takes oxycodone 5mg q4 prn for pain and per daughter in law she gets more confused after taking the pain meds.    In ED, her vitals were stable. satting 99% on room air  Labs significant for WBC 11.5K, Ca 12.1, , AST 45  CT head showed cerebral and cerebellar atrophy, mets could not be ruled out.  CT chest non-con:   1. Multiple bilateral pulmonary nodules are identified suspicious for metastases. A nodule is noted in the right upper lobe measuring 13 x 7.2 mm. A nodule measuring 1.2 x 1.2 cm noted in the left upper lobe. Multiple smaller nodules are noted within the left lung. Additional right-sided nodules may be obscured by the large effusion and areas of atelectasis.  2. There is a large right sided pleural effusion, including a large loculation in the right mid and lower lung field measuring 6 x 6 x 5 cm. There is a small left-sided pleural effusion.    s/p 1L NS bolus, Aztreonam and Levaquin in ED.    Vital Signs Last 24 Hrs  T(F): 95.6 (21 May 2024 13:22), Max: 95.6 (21 May 2024 13:22)  HR: 64 (21 May 2024 13:22) (64 - 64)  BP: 102/62 (21 May 2024 13:22) (102/62 - 102/62)  RR: 18 (21 May 2024 13:22) (18 - 18)  SpO2: 99% (21 May 2024 13:22) (99% - 99%) on room air    Pt. is being admitted to medicine for management of AMS, fall, PNA and pleural effusion.       (21 May 2024 20:56)      INFECTIOUS DISEASE HISTORY:  ID consulted for PNA  CT wiht pleural effusion  Denies fever, cough  Labs with WBC 11, transaminitis   on oral chemo    daughter at bedside     Currently ordered for:  aztreonam  IVPB 2000 milliGRAM(s) IV Intermittent once  aztreonam  IVPB 2000 milliGRAM(s) IV Intermittent every 8 hours  metroNIDAZOLE  IVPB 500 milliGRAM(s) IV Intermittent every 8 hours  vancomycin  IVPB 750 milliGRAM(s) IV Intermittent every 24 hours      PMH  PAST MEDICAL & SURGICAL HISTORY:  Renal cell carcinoma      Hypertension      Breast cancer      Deep vein thrombosis (DVT)      H/O left nephrectomy      H/O partial adrenalectomy      History of partial pancreatectomy      S/P splenectomy      H/O lumpectomy          FAMILY HISTORY  non-contributory     SOCIAL HISTORY  Social History:  no ivdu, no travel recent      ROS  General: Denies rigors, nightsweats  HEENT: Denies headache, rhinorrhea, sore throat, eye pain  CV: Denies CP, palpitations  PULM: Denies wheezing, hemoptysis  GI: Denies hematemesis, hematochezia, melena  : Denies discharge, hematuria  MSK: Denies arthralgias, myalgias  SKIN: Denies rash, lesions  NEURO: Denies paresthesias, weakness  PSYCH: Denies depression, anxiety     VITALS:  T(F): 97.8, Max: 98.3 (05-21-24 @ 22:30)  HR: 85  BP: 98/59  RR: 18Vital Signs Last 24 Hrs  T(C): 36.6 (22 May 2024 04:54), Max: 36.8 (21 May 2024 22:30)  T(F): 97.8 (22 May 2024 04:54), Max: 98.3 (21 May 2024 22:30)  HR: 85 (22 May 2024 04:54) (85 - 89)  BP: 98/59 (22 May 2024 04:54) (98/59 - 128/65)  BP(mean): --  RR: 18 (22 May 2024 04:54) (18 - 18)  SpO2: 93% (22 May 2024 04:54) (91% - 93%)    Parameters below as of 21 May 2024 22:30  Patient On (Oxygen Delivery Method): room air        PHYSICAL EXAM:  Gen: NAD, resting in bed chronically ill appearing NC   HEENT: Normocephalic, atraumatic  Neck: supple, no lymphadenopathy  CV: Regular rate & regular rhythm  Lungs: decreased BS at bases, no fremitus  Abdomen: distended , +RUQ tenderness to palpation   Ext: Warm, well perfused, trace LE edema  Neuro: non focal, awake  Skin: no rash, no erythema  Lines: no phlebitis     TESTS & MEASUREMENTS:                        8.7    10.84 )-----------( 450      ( 22 May 2024 05:37 )             28.6     05-22    135  |  99  |  18  ----------------------------<  101<H>  5.0   |  26  |  1.0    Ca    10.2      22 May 2024 05:37  Mg     2.2     05-22    TPro  6.1  /  Alb  2.5<L>  /  TBili  0.3  /  DBili  x   /  AST  29  /  ALT  23  /  AlkPhos  247<H>  05-22      LIVER FUNCTIONS - ( 22 May 2024 05:37 )  Alb: 2.5 g/dL / Pro: 6.1 g/dL / ALK PHOS: 247 U/L / ALT: 23 U/L / AST: 29 U/L / GGT: x           Urinalysis Basic - ( 22 May 2024 05:37 )    Color: x / Appearance: x / SG: x / pH: x  Gluc: 101 mg/dL / Ketone: x  / Bili: x / Urobili: x   Blood: x / Protein: x / Nitrite: x   Leuk Esterase: x / RBC: x / WBC x   Sq Epi: x / Non Sq Epi: x / Bacteria: x        Culture - Other (collected 03-13-24 @ 11:20)  Source: Wound Wound  Final Report (03-15-24 @ 13:58):    No growth to date.            INFECTIOUS DISEASES TESTING  MRSA PCR Result.: Negative (05-22-24 @ 10:19)  Hepatitis B Surface Antigen: Nonreact (03-13-24 @ 05:39)  HIV-1/2 Combo Result: Nonreact (03-13-24 @ 05:39)      INFLAMMATORY MARKERS      RADIOLOGY & ADDITIONAL TESTS:  I have personally reviewed the last Chest xray  CXR      CT  CT Chest No Cont:   ACC: [id number] EXAM:  CT CHEST   ORDERED BY: NANCY FINLEY     PROCEDURE DATE:  05/21/2024          INTERPRETATION:  REASON FOR EXAM / CLINICAL STATEMENT:  Right sided   mass-like lesion on chest x-ray. WBC 11.52  Hgb 9.1   PMHx of breast   cancer;metastatic renal cell cancer on Inlyta; DVT; HT; Hypothyroidism;   post left nephrectomy; post lumpectomy; post partial adrenalectomy; post   distal pancreatectomy; post splenectomy.    TECHNIQUE:  Non-contrast CT of the thorax. Contiguous axial CT images   were obtained from the thoracic inlet to the upper abdomen without IV   contrast.  Reformatted images in the coronal and sagittal planes were   acquired.      COMPARISON CT: CT scan of the abdomen dated 2/25/2024    FINDINGS:    TUBES AND LINES: None.    HEART AND VESSELS: The heart is normal in size. There is no pericardial   effusion.    Normal caliber aorta and pulmonary artery. There is no evidence of aortic   aneurysm.    MEDIASTINUM: There are no suspicious mediastinal, hilar or axillary lymph   nodes.    AIRWAYS, LUNGS AND PLEURA: The central tracheobronchial tree is patent.   Multiple bilateral pulmonary nodules are identified suspicious for   metastases. A nodule is noted in the right upper lobe measuring 13 x 7.2   mm. A nodule measuring 1.2 x 1.2 cm noted in the left upper lobe.   Multiple smaller nodules are noted within the left lung. Additional   right-sided nodules may be obscured by the large effusion and areas of   atelectasis.    There is a large right sided pleural effusion including a large   loculation in the right mid and lower lung field measuring 6 x 6 x 5 cm.   There is a small left-sided pleural effusion.    Areas of compression atelectasis are noted at the lung bases.    There is no pneumothorax.    UPPER ABDOMEN: The previously noted hepatic lesions are poorly visualized   without IV contrast. Surgical clips are again noted in the upper abdomen.    BONES AND SOFT TISSUES: Mild degenerative changes of the spine.    IMPRESSION:    1. Multiple bilateral pulmonary nodules are identified suspicious for   metastases. A nodule is noted in the right upper lobe measuring 13 x 7.2   mm. A nodule measuring 1.2 x 1.2 cm noted in the left upper lobe.   Multiple smaller nodules are noted within the left lung. Additional   right-sided nodules may be obscured by the large effusion and areas of   atelectasis.    2. There is a large right sided pleural effusion, including a large   loculation in the right mid and lower lung field measuring 6 x 6 x 5 cm.   There is a small left-sided pleural effusion.    --- End of Report ---            AMERICA SÁNCHEZ MD; Attending Interventional Radiologist  This document has been electronically signed. May 21 2024  5:33PM (05-21-24 @ 15:51)      CARDIOLOGY TESTING  12 Lead ECG:   Ventricular Rate 81 BPM    Atrial Rate 81 BPM    P-R Interval 148 ms    QRS Duration 82 ms    Q-T Interval 368 ms    QTC Calculation(Bazett) 427 ms    P Axis 47 degrees    R Axis 47 degrees    T Axis 44 degrees    Diagnosis Line Normal sinus rhythm  Normal ECG    Confirmed by KIRAN SEGURA MD ([id number]) on 5/22/2024 6:59:41 AM (05-21-24 @ 17:16)      MEDICATIONS  atorvastatin 40 Oral at bedtime  aztreonam  IVPB 2000 IV Intermittent once  aztreonam  IVPB 2000 IV Intermittent every 8 hours  chlorhexidine 2% Cloths 1 Topical daily  enoxaparin Injectable 40 SubCutaneous every 24 hours  levothyroxine 100 Oral daily  metroNIDAZOLE  IVPB 500 IV Intermittent every 8 hours  sodium chloride 0.9%. 1000 IV Continuous <Continuous>  vancomycin  IVPB 750 IV Intermittent every 24 hours        ANTIBIOTICS:  aztreonam  IVPB 2000 milliGRAM(s) IV Intermittent every 8 hours  aztreonam  IVPB 2000 milliGRAM(s) IV Intermittent once  metroNIDAZOLE  IVPB 500 milliGRAM(s) IV Intermittent every 8 hours  vancomycin  IVPB 750 milliGRAM(s) IV Intermittent every 24 hours      ALLERGIES:  fish (Unknown)  penicillins (Angioedema)

## 2024-05-22 NOTE — HOSPICE CARE NOTE - CONVESATION DETAILS
Hospice intake RN spoke to Patients DIL Arlethshelbie Grant [phone]. Hospice philosophy discussed. Reviewed options for in home hospice vs [facility] vs SNF. As per Arleth she will discuss with her . Arleth had recent back surgery and cannot care for patient herself at this time. She would need to private hire an aid to assist with patient care. She is also considering [facility] when bed becomes available. Hospice contact information was provided to Arleth, she was encouraged to call with any questions. We are willing to accept patient if family can coordinate a safe discharge plan.

## 2024-05-23 LAB
ANION GAP SERPL CALC-SCNC: 13 MMOL/L — SIGNIFICANT CHANGE UP (ref 7–14)
BASOPHILS # BLD AUTO: 0.03 K/UL — SIGNIFICANT CHANGE UP (ref 0–0.2)
BASOPHILS NFR BLD AUTO: 0.3 % — SIGNIFICANT CHANGE UP (ref 0–1)
BUN SERPL-MCNC: 16 MG/DL — SIGNIFICANT CHANGE UP (ref 10–20)
CA-I BLD-SCNC: 6 MG/DL — HIGH (ref 4.5–5.6)
CALCIUM SERPL-MCNC: 9.8 MG/DL — SIGNIFICANT CHANGE UP (ref 8.4–10.5)
CHLORIDE SERPL-SCNC: 101 MMOL/L — SIGNIFICANT CHANGE UP (ref 98–110)
CO2 SERPL-SCNC: 20 MMOL/L — SIGNIFICANT CHANGE UP (ref 17–32)
CREAT SERPL-MCNC: 1.1 MG/DL — SIGNIFICANT CHANGE UP (ref 0.7–1.5)
EGFR: 55 ML/MIN/1.73M2 — LOW
EOSINOPHIL # BLD AUTO: 0.08 K/UL — SIGNIFICANT CHANGE UP (ref 0–0.7)
EOSINOPHIL NFR BLD AUTO: 0.7 % — SIGNIFICANT CHANGE UP (ref 0–8)
GLUCOSE SERPL-MCNC: 72 MG/DL — SIGNIFICANT CHANGE UP (ref 70–99)
HCT VFR BLD CALC: 29.6 % — LOW (ref 37–47)
HGB BLD-MCNC: 9.2 G/DL — LOW (ref 12–16)
IMM GRANULOCYTES NFR BLD AUTO: 0.8 % — HIGH (ref 0.1–0.3)
LYMPHOCYTES # BLD AUTO: 1.07 K/UL — LOW (ref 1.2–3.4)
LYMPHOCYTES # BLD AUTO: 9.9 % — LOW (ref 20.5–51.1)
MAGNESIUM SERPL-MCNC: 1.9 MG/DL — SIGNIFICANT CHANGE UP (ref 1.8–2.4)
MCHC RBC-ENTMCNC: 28.8 PG — SIGNIFICANT CHANGE UP (ref 27–31)
MCHC RBC-ENTMCNC: 31.1 G/DL — LOW (ref 32–37)
MCV RBC AUTO: 92.5 FL — SIGNIFICANT CHANGE UP (ref 81–99)
MONOCYTES # BLD AUTO: 2.17 K/UL — HIGH (ref 0.1–0.6)
MONOCYTES NFR BLD AUTO: 20.1 % — HIGH (ref 1.7–9.3)
NEUTROPHILS # BLD AUTO: 7.36 K/UL — HIGH (ref 1.4–6.5)
NEUTROPHILS NFR BLD AUTO: 68.2 % — SIGNIFICANT CHANGE UP (ref 42.2–75.2)
NRBC # BLD: 0 /100 WBCS — SIGNIFICANT CHANGE UP (ref 0–0)
PLATELET # BLD AUTO: 493 K/UL — HIGH (ref 130–400)
PMV BLD: 10.2 FL — SIGNIFICANT CHANGE UP (ref 7.4–10.4)
POTASSIUM SERPL-MCNC: 4.6 MMOL/L — SIGNIFICANT CHANGE UP (ref 3.5–5)
POTASSIUM SERPL-SCNC: 4.6 MMOL/L — SIGNIFICANT CHANGE UP (ref 3.5–5)
RBC # BLD: 3.2 M/UL — LOW (ref 4.2–5.4)
RBC # FLD: 19.8 % — HIGH (ref 11.5–14.5)
SODIUM SERPL-SCNC: 134 MMOL/L — LOW (ref 135–146)
VIT D25+D1,25 OH+D1,25 PNL SERPL-MCNC: 12.1 PG/ML — LOW (ref 19.9–79.3)
WBC # BLD: 10.8 K/UL — SIGNIFICANT CHANGE UP (ref 4.8–10.8)
WBC # FLD AUTO: 10.8 K/UL — SIGNIFICANT CHANGE UP (ref 4.8–10.8)

## 2024-05-23 PROCEDURE — 99232 SBSQ HOSP IP/OBS MODERATE 35: CPT

## 2024-05-23 RX ADMIN — ATORVASTATIN CALCIUM 40 MILLIGRAM(S): 80 TABLET, FILM COATED ORAL at 21:49

## 2024-05-23 RX ADMIN — ENOXAPARIN SODIUM 40 MILLIGRAM(S): 100 INJECTION SUBCUTANEOUS at 05:45

## 2024-05-23 RX ADMIN — OXYCODONE HYDROCHLORIDE 5 MILLIGRAM(S): 5 TABLET ORAL at 18:41

## 2024-05-23 RX ADMIN — CEFTRIAXONE 100 MILLIGRAM(S): 500 INJECTION, POWDER, FOR SOLUTION INTRAMUSCULAR; INTRAVENOUS at 12:55

## 2024-05-23 RX ADMIN — OXYCODONE HYDROCHLORIDE 5 MILLIGRAM(S): 5 TABLET ORAL at 12:55

## 2024-05-23 RX ADMIN — CHLORHEXIDINE GLUCONATE 1 APPLICATION(S): 213 SOLUTION TOPICAL at 12:56

## 2024-05-23 RX ADMIN — Medication 100 MICROGRAM(S): at 05:46

## 2024-05-23 NOTE — PROGRESS NOTE ADULT - ASSESSMENT
69-year-old female with PMHx of LE DVT(2019)on Eliquis, HTN, hypothyroidism, metastatic RCC(diagnosed about 5 yrs ago) s/p L nephrectomy, distal pancreatectomy and splenectomy currently not on chemo or radiationTx (follows with Dr. Voss at [facility]), Anxiety, presents to the ED with confusion, poor oral intake, generalized weakness and fall.     #Progressive confusion -suspected mets to brain in patient w/ RCC r/o TME r/o seizures r/o infection  #Large R pleural effusion and small L pleural effusion- most likely malignancy related r/o empyema   #s/p fall    #metastatic RCC   -Patient and family refusing CT A/P with IV contrast - order canceled   -rEEG   -holding Eliquis for now   -CTS eval / Pulm eval for pleural effusion  -ID consult -  Dc'd vanc, flagyl, aztreonam. Doubt PNA, doubt empyema, suspect POD. OK Ceftriaxone 1g q24h IV for now, aware of PCN allergy  -heme/onc consult  -palliative consult - patient made DNR DNI. Family considering hospice.  -hospice consult     #Hypercalcemia most likely 2/2 malignancy   -check iCa / PTH/ PTHrP / Vit D  -IV fluids   -hold Ca/Vit D    #Hypothyroidism  -c/w levothyroxine and check TSH     #Dyslipidemia    -c/w statin     #Anxiety   -c/w lexapro    Pending: palliative/hospice/GOC

## 2024-05-23 NOTE — ADVANCED PRACTICE NURSE CONSULT - RECOMMEDATIONS
Cleanse sacrococcygeal region with soap and water.   Pat dry apply moisture barrier cream twice a day and prn for soiling.     Cleanse skin tear to left arm with soap and water  Pat dry, apply Xeroform, wrap with Kerlix twice a day, prn for soiling    Maintain pressure injury prevention.   Keep skin clean.   Maintain incontinence care.   Monitor wound for changes and notify provider   Case discussed with primary RN

## 2024-05-23 NOTE — PROGRESS NOTE ADULT - ASSESSMENT
69-year-old female with PMHx of LE DVT(2019)on Eliquis, HTN, hypothyroidism, metastatic RCC(diagnosed about 5 yrs ago) s/p L nephrectomy, distal pancreatectomy and splenectomy currently not on chemo or radiationTx (follows with Dr. Voss at [facility]), Anxiety, presents to the ED with confusion, poor oral intake, generalized weakness and fall. Palliative care consulted for GOC.      Education about palliative care provided to patient/family.  See Recs below.    Please call [phone] with questions or concerns 24/7.   We will continue to follow.

## 2024-05-23 NOTE — ADVANCED PRACTICE NURSE CONSULT - ASSESSMENT
History of Present Illness:   69-year-old female with PMHx of LE DVT(2019)on Eliquis, HTN, hypothyroidism, metastatic RCC(diagnosed about 5 yrs ago) s/p L nephrectomy, distal pancreatectomy and splenectomy currently not on chemo or radiationTx (follows with Dr. Voss at [facility]), Anxiety, presents to the ED with confusion, poor oral intake, generalized weakness and fall.     Talked to daughter in law(Arleth) over the phone, she states that patient has been increasingly confused over the last month. She gets episodes of confusion where she can't recognize her  and seems confused. Has also had increasing difficulty with ambulation and had a fall yesterday. States she was walking to her bathroom when she tripped and fell onto her forearm(bruises noted over the forearm), Denies any head trauma or injuries elsewhere, On Eliquis(last dose taken on 5/20 PM), denies LOC. On my exam pt. was AAOx3 answered all the questions appropriately, appeared comfortable. Patient states she is been feeling more tired with poor appetite and difficulty walking within the last couple of weeks. Pt. also c/o pain all over her body. Denies fevers, chills, SOB, chest pain, nausea, vomiting, diarrhea or urinary symptoms. Of note she was recently admitted to [facility] in March for B/L LE rash which is now improved with topical steroids. Her chemotherapy(Inylta) was stopped by her oncologist few weeks ago as it was not working her. She takes oxycodone 5mg q4 prn for pain and per daughter in law she gets more confused after taking the pain meds. s/p 1L NS bolus, Aztreonam and Levaquin in ED. Pt. is being admitted to medicine for management of AMS, fall, PNA and pleural effusion.    Allergies and Intolerances:        Allergies:  	penicillins: Drug Category, Angioedema, throat closing per pt, hives  	fish: Food, Unknown    Home Medications:   * Patient Currently Takes Medications as of 16-Mar-2024 11:50 documented in Structured Notes  · 	Lexapro 10 mg oral tablet: Last Dose Taken:  , orally Family unsure about the dose, please confirm in the morning  · 	Synthroid 100 mcg (0.1 mg) oral tablet: Last Dose Taken:  , 1 tab(s) orally once a day  · 	oxyCODONE 5 mg oral tablet: Last Dose Taken:  , 1 tab(s) orally 4 times a day as needed for  pain  · 	CALCIUM 600 MG-VIT D3 5 MCG TB: 2 tab(s) orally once a day  · 	Crestor 10 mg oral tablet: Last Dose Taken:  , 1 tab(s) orally once a day  · 	ELIQUIS 2.5 MG TABLET: 1 tab(s) orally 2 times a day    Patient History:    Past Medical, Past Surgical, and Family History:  PAST MEDICAL HISTORY:  Breast cancer   Deep vein thrombosis (DVT)   Hypertension   Renal cell carcinoma.     PAST SURGICAL HISTORY:  H/O left nephrectomy   H/O lumpectomy   H/O partial adrenalectomy   History of partial pancreatectomy   S/P splenectomy.    Patient received lying in bed. Alert and awake. Limited mobility. Incontinent of urine and stool. High risk for pressure injury.    Dry hyperpigmentation to sacrococcygeal region.     Type of Wound: Skin tear  Location: Left arm  Wound bed: Pink partial thickness skin loss  Wound edges: Intact  Periwound: Hyperpigmented  Wound exudate: None  Wound odor: No  Induration, erythema, warmth: No  Wound pain: No

## 2024-05-23 NOTE — PROGRESS NOTE ADULT - ASSESSMENT
69-year-old female with PMHx of LE DVT(2019)on Eliquis, HTN, hypothyroidism, metastatic RCC(diagnosed about 5 yrs ago) s/p L nephrectomy, distal pancreatectomy and splenectomy currently not on chemo or radiationTx (follows with Dr. Voss at [facility]), Anxiety, presents to the ED with confusion, poor oral intake, generalized weakness and fall.     Talked to sister at length at bedside today 05/22/24.   Patient has been increasingly confused over the last month. She gets episodes of confusion where she can't recognize her  and seems confused. Has also had increasing difficulty with ambulation and had a fall yesterday. States she was walking to her bathroom when she tripped and fell onto her forearm(bruises noted over the forearm), Denies any head trauma or injuries elsewhere, On Eliquis(last dose taken on 5/20 PM), denies LOC. On my exam pt. was AAOx3 answered all the questions appropriately, appeared comfortable. Patient states she is been feeling more tired with poor appetite and difficulty walking within the last couple of weeks. Pt. also c/o pain all over her body. Denies fevers, chills, SOB, chest pain, nausea, vomiting, diarrhea or urinary symptoms. Of note she was recently admitted to [facility] in March for B/L LE rash which is now improved with topical steroids.     Her chemotherapy(Inylta) was stopped by her oncologist few weeks ago as it was not working her. She takes oxycodone 5mg q4 prn for pain and per daughter in law she gets more confused after taking the pain meds.    CT head showed cerebral and cerebellar atrophy, mets could not be ruled out.  CT chest non-con:   1. Multiple bilateral pulmonary nodules are identified suspicious for metastases. A nodule is noted in the right upper lobe measuring 13 x 7.2 mm. A nodule measuring 1.2 x 1.2 cm noted in the left upper lobe. Multiple smaller nodules are noted within the left lung. Additional right-sided nodules may be obscured by the large effusion and areas of atelectasis.  2. There is a large right sided pleural effusion, including a large loculation in the right mid and lower lung field measuring 6 x 6 x 5 cm. There is a small left-sided pleural effusion.    s/p 1L NS bolus, Aztreonam and Levaquin in ED.    A/p  Progressive confusion -suspected mets to brain in patient w/ RCC r/o TME r/o seizures r/o infection  -Patient will need CT imaging w/ contrast (patient and family refusing )  Large R pleural effusion and small L pleural effusion- most likely malignancy related r/o empyema   s/p fall    -rEEG   -agree w/ holding Eliquis for now   -CT S eval / Pulm eval   -IV abx  >> De escalate as appropriate.   - No obvious source of infection at this time.     Hypercalcemia most likely 2/2 malignancy   -agree w/ iCa / PTH/ PTHrP / Vit D  -IV fluids   -hold Ca/Vit D    Metastatic RCC   -CT imaging w/ contrast   -Hem/Onc eval   -Palliative consult     Hypothyroidism  Dyslipidemia    -c/w levothyroxine and check TSH   -statin   Anxiety   -lexapro  DVT prophylaxis - lovenox until procedure     Reviewed labs and imaging independently.     Pending: Hospice  Plan d/w the sister at bedside.   Dispo: TBD

## 2024-05-24 LAB
ANION GAP SERPL CALC-SCNC: 12 MMOL/L — SIGNIFICANT CHANGE UP (ref 7–14)
BASOPHILS # BLD AUTO: 0.03 K/UL — SIGNIFICANT CHANGE UP (ref 0–0.2)
BASOPHILS NFR BLD AUTO: 0.3 % — SIGNIFICANT CHANGE UP (ref 0–1)
BUN SERPL-MCNC: 17 MG/DL — SIGNIFICANT CHANGE UP (ref 10–20)
CALCIUM SERPL-MCNC: 9.6 MG/DL — SIGNIFICANT CHANGE UP (ref 8.4–10.4)
CHLORIDE SERPL-SCNC: 99 MMOL/L — SIGNIFICANT CHANGE UP (ref 98–110)
CO2 SERPL-SCNC: 23 MMOL/L — SIGNIFICANT CHANGE UP (ref 17–32)
CREAT SERPL-MCNC: 1.1 MG/DL — SIGNIFICANT CHANGE UP (ref 0.7–1.5)
EGFR: 55 ML/MIN/1.73M2 — LOW
EOSINOPHIL # BLD AUTO: 0.06 K/UL — SIGNIFICANT CHANGE UP (ref 0–0.7)
EOSINOPHIL NFR BLD AUTO: 0.6 % — SIGNIFICANT CHANGE UP (ref 0–8)
GLUCOSE SERPL-MCNC: 92 MG/DL — SIGNIFICANT CHANGE UP (ref 70–99)
HCT VFR BLD CALC: 27.6 % — LOW (ref 37–47)
HGB BLD-MCNC: 8.8 G/DL — LOW (ref 12–16)
IMM GRANULOCYTES NFR BLD AUTO: 0.8 % — HIGH (ref 0.1–0.3)
LYMPHOCYTES # BLD AUTO: 1.26 K/UL — SIGNIFICANT CHANGE UP (ref 1.2–3.4)
LYMPHOCYTES # BLD AUTO: 12.4 % — LOW (ref 20.5–51.1)
MAGNESIUM SERPL-MCNC: 1.9 MG/DL — SIGNIFICANT CHANGE UP (ref 1.8–2.4)
MCHC RBC-ENTMCNC: 28.6 PG — SIGNIFICANT CHANGE UP (ref 27–31)
MCHC RBC-ENTMCNC: 31.9 G/DL — LOW (ref 32–37)
MCV RBC AUTO: 89.6 FL — SIGNIFICANT CHANGE UP (ref 81–99)
MONOCYTES # BLD AUTO: 1.91 K/UL — HIGH (ref 0.1–0.6)
MONOCYTES NFR BLD AUTO: 18.8 % — HIGH (ref 1.7–9.3)
NEUTROPHILS # BLD AUTO: 6.82 K/UL — HIGH (ref 1.4–6.5)
NEUTROPHILS NFR BLD AUTO: 67.1 % — SIGNIFICANT CHANGE UP (ref 42.2–75.2)
NRBC # BLD: 0 /100 WBCS — SIGNIFICANT CHANGE UP (ref 0–0)
PLATELET # BLD AUTO: 479 K/UL — HIGH (ref 130–400)
PMV BLD: 10.2 FL — SIGNIFICANT CHANGE UP (ref 7.4–10.4)
POTASSIUM SERPL-MCNC: 4.3 MMOL/L — SIGNIFICANT CHANGE UP (ref 3.5–5)
POTASSIUM SERPL-SCNC: 4.3 MMOL/L — SIGNIFICANT CHANGE UP (ref 3.5–5)
RBC # BLD: 3.08 M/UL — LOW (ref 4.2–5.4)
RBC # FLD: 20 % — HIGH (ref 11.5–14.5)
SODIUM SERPL-SCNC: 134 MMOL/L — LOW (ref 135–146)
WBC # BLD: 10.16 K/UL — SIGNIFICANT CHANGE UP (ref 4.8–10.8)
WBC # FLD AUTO: 10.16 K/UL — SIGNIFICANT CHANGE UP (ref 4.8–10.8)

## 2024-05-24 PROCEDURE — 99232 SBSQ HOSP IP/OBS MODERATE 35: CPT

## 2024-05-24 RX ADMIN — CHLORHEXIDINE GLUCONATE 1 APPLICATION(S): 213 SOLUTION TOPICAL at 13:12

## 2024-05-24 RX ADMIN — OXYCODONE HYDROCHLORIDE 5 MILLIGRAM(S): 5 TABLET ORAL at 13:55

## 2024-05-24 RX ADMIN — ATORVASTATIN CALCIUM 40 MILLIGRAM(S): 80 TABLET, FILM COATED ORAL at 22:05

## 2024-05-24 RX ADMIN — ENOXAPARIN SODIUM 40 MILLIGRAM(S): 100 INJECTION SUBCUTANEOUS at 05:25

## 2024-05-24 RX ADMIN — OXYCODONE HYDROCHLORIDE 5 MILLIGRAM(S): 5 TABLET ORAL at 18:11

## 2024-05-24 RX ADMIN — OXYCODONE HYDROCHLORIDE 5 MILLIGRAM(S): 5 TABLET ORAL at 09:45

## 2024-05-24 RX ADMIN — Medication 100 MICROGRAM(S): at 05:25

## 2024-05-24 RX ADMIN — OXYCODONE HYDROCHLORIDE 5 MILLIGRAM(S): 5 TABLET ORAL at 22:49

## 2024-05-24 RX ADMIN — CEFTRIAXONE 100 MILLIGRAM(S): 500 INJECTION, POWDER, FOR SOLUTION INTRAMUSCULAR; INTRAVENOUS at 13:12

## 2024-05-24 RX ADMIN — Medication 650 MILLIGRAM(S): at 13:34

## 2024-05-24 NOTE — DIETITIAN INITIAL EVALUATION ADULT - PERTINENT LABORATORY DATA
05-24    134<L>  |  99  |  17  ----------------------------<  92  4.3   |  23  |  1.1    Ca    9.6      24 May 2024 08:11  Mg     1.9     05-24    A1C with Estimated Average Glucose Result: 5.4 % (05-22-24 @ 05:37)

## 2024-05-24 NOTE — DIETITIAN INITIAL EVALUATION ADULT - ETIOLOGY
related to poor appetite 2/2 malignancy?  related to increased metabolic demand in setting of catabolic disease

## 2024-05-24 NOTE — DIETITIAN INITIAL EVALUATION ADULT - OTHER INFO
69-year-old female presents to the ED with confusion, poor oral intake, generalized weakness and fall.   #Progressive confusion -suspected mets to brain in patient w/ RCC r/o TME r/o seizures r/o infection  #Large R pleural effusion and small L pleural effusion- most likely malignancy related r/o empyema   -palliative consult - patient made DNR DNI. Family considering hospice.  -hospice consult   #Hypercalcemia most likely 2/2 malignancy

## 2024-05-24 NOTE — PROGRESS NOTE ADULT - ASSESSMENT
69-year-old female with PMHx of LE DVT(2019)on Eliquis, HTN, hypothyroidism, metastatic RCC(diagnosed about 5 yrs ago) s/p L nephrectomy, distal pancreatectomy and splenectomy currently not on chemo or radiationTx (follows with Dr. Voss at [facility]), Anxiety, presents to the ED with confusion, poor oral intake, generalized weakness and fall. Palliative care consulted for GOC.    Patient awaiting disposition to hospice,      Education about palliative care provided to patient/family.  See Recs below.    Please call [phone] with questions or concerns 24/7.   We will continue to follow.

## 2024-05-24 NOTE — DIETITIAN INITIAL EVALUATION ADULT - PERTINENT MEDS FT
MEDICATIONS  (STANDING):  atorvastatin 40 milliGRAM(s) Oral at bedtime  cefTRIAXone   IVPB 1000 milliGRAM(s) IV Intermittent every 24 hours  enoxaparin Injectable 40 milliGRAM(s) SubCutaneous every 24 hours  levothyroxine 100 MICROGram(s) Oral daily  sodium chloride 0.9%. 1000 milliLiter(s) (150 mL/Hr) IV Continuous <Continuous>    MEDICATIONS  (PRN):  oxyCODONE    IR 5 milliGRAM(s) Oral every 4 hours PRN Severe Pain (7 - 10)

## 2024-05-24 NOTE — DIETITIAN INITIAL EVALUATION ADULT - ENTERAL
Nutrition Intervention:meals and snacks,medical food supplements   Nutrition Monitoring:diet order,energy intake,body composition,NFPF, lytes, GI (BM), BG

## 2024-05-24 NOTE — HOSPICE CARE NOTE - CONVESATION DETAILS
Update to D/C plan- family has requested home hospice services. DME pending delivery this evening: hospital bed,02 and commode.  Family requesting D/C on Monday Please D/C with appropriate medication for symptom management until hospice services can be started at home on Tuesday. Update provided to ERIC Troncoso

## 2024-05-24 NOTE — DIETITIAN INITIAL EVALUATION ADULT - ADD RECOMMEND
1. Add Ensure Plus HP 2x/day (700kcal/40g PRO)   2. cont w/ current diet order   3. encourage and assist with PO intake

## 2024-05-24 NOTE — PROGRESS NOTE ADULT - ASSESSMENT
69-year-old female with PMHx of LE DVT(2019)on Eliquis, HTN, hypothyroidism, metastatic RCC(diagnosed about 5 yrs ago) s/p L nephrectomy, distal pancreatectomy and splenectomy currently not on chemo or radiationTx (follows with Dr. Voss at [facility]), Anxiety, presents to the ED with confusion, poor oral intake, generalized weakness and fall.     #Progressive confusion -suspected mets to brain in patient w/ RCC r/o TME r/o seizures r/o infection  #Large R pleural effusion and small L pleural effusion- most likely malignancy related r/o empyema   #s/p fall    #metastatic RCC   -Patient and family refusing CT A/P with IV contrast - order canceled   -rEEG   -holding Eliquis for now   -CTS eval / Pulm eval for pleural effusion - on hold pending hospice eval/GOC  -ID consult -  Dc'd vanc, flagyl, aztreonam. Doubt PNA, doubt empyema, suspect POD. OK Ceftriaxone 1g q24h IV for now, aware of PCN allergy  -heme/onc consult  -palliative consult - patient made DNR DNI. Family considering hospice.  -hospice consult     #Hypercalcemia most likely 2/2 malignancy   -check iCa / PTH/ PTHrP / Vit D  -IV fluids   -hold Ca/Vit D    #Hypothyroidism  -c/w levothyroxine and check TSH     #Dyslipidemia    -c/w statin     #Anxiety   -c/w lexapro    Pending: palliative/hospice/GOC

## 2024-05-24 NOTE — DIETITIAN INITIAL EVALUATION ADULT - ENERGY INTAKE
At admit, pt reports having a poor appetite as well. Pt is eating <50% of meals. Discussed supplements -- agreeable to add.

## 2024-05-24 NOTE — DIETITIAN INITIAL EVALUATION ADULT - OTHER CALCULATIONS
Energy: 1200-1500kcal/day (using MSJ 1.2-1.5AF due to cancer with mets)   Protein: 64-80g/day (1.2-1.5g/kg -- same reason as above)

## 2024-05-24 NOTE — DIETITIAN INITIAL EVALUATION ADULT - ORAL INTAKE PTA/DIET HISTORY
pt and sister at bedside. Both report that pt had a poor appetite PTA. Pt was however, forgetful and didn't really know how long she's had a poor appetite for, says maybe 6 months. She said she eats well at home and then later said she didn't. She is unsure of her UBW, but thinks it was around 45.9kg, but does not know when it was taken. Wt at admit: 52.8kg -- stable. Pt is allergic to all fish. No MVI.

## 2024-05-24 NOTE — PROGRESS NOTE ADULT - ASSESSMENT
69-year-old female with PMHx of LE DVT(2019)on Eliquis, HTN, hypothyroidism, metastatic RCC(diagnosed about 5 yrs ago) s/p L nephrectomy, distal pancreatectomy and splenectomy currently not on chemo or radiationTx (follows with Dr. Voss at [facility]), Anxiety, presents to the ED with confusion, poor oral intake, generalized weakness and fall.     Talked to sister at length at bedside today 05/22/24.   Patient has been increasingly confused over the last month. She gets episodes of confusion where she can't recognize her  and seems confused. Has also had increasing difficulty with ambulation and had a fall yesterday. States she was walking to her bathroom when she tripped and fell onto her forearm(bruises noted over the forearm), Denies any head trauma or injuries elsewhere, On Eliquis(last dose taken on 5/20 PM), denies LOC. On my exam pt. was AAOx3 answered all the questions appropriately, appeared comfortable. Patient states she is been feeling more tired with poor appetite and difficulty walking within the last couple of weeks. Pt. also c/o pain all over her body. Denies fevers, chills, SOB, chest pain, nausea, vomiting, diarrhea or urinary symptoms. Of note she was recently admitted to [facility] in March for B/L LE rash which is now improved with topical steroids.     Her chemotherapy(Inylta) was stopped by her oncologist few weeks ago as it was not working her. She takes oxycodone 5mg q4 prn for pain and per daughter in law she gets more confused after taking the pain meds.    CT head showed cerebral and cerebellar atrophy, mets could not be ruled out.  CT chest non-con:   1. Multiple bilateral pulmonary nodules are identified suspicious for metastases. A nodule is noted in the right upper lobe measuring 13 x 7.2 mm. A nodule measuring 1.2 x 1.2 cm noted in the left upper lobe. Multiple smaller nodules are noted within the left lung. Additional right-sided nodules may be obscured by the large effusion and areas of atelectasis.  2. There is a large right sided pleural effusion, including a large loculation in the right mid and lower lung field measuring 6 x 6 x 5 cm. There is a small left-sided pleural effusion.    s/p 1L NS bolus, Aztreonam and Levaquin in ED.    A/p  Progressive confusion -suspected mets to brain in patient w/ RCC r/o TME r/o seizures r/o infection  -Patient needs CT imaging w/ contrast (patient and family refusing )  Large R pleural effusion and small L pleural effusion- most likely malignancy related r/o empyema   s/p fall    -rEEG   -agree w/ holding Eliquis for now   -CT S eval / Pulm eval   -IV abx  >> will stop abx on dc.    - No obvious source of infection at this time.     Hypercalcemia most likely 2/2 malignancy   -agree w/ iCa / PTH/ PTHrP / Vit D  -IV fluids can be dc'd  -hold Ca/Vit D    Metastatic RCC   -CT imaging w/ contrast   -Hem/Onc eval   -Palliative consult     Hypothyroidism  Dyslipidemia    -c/w levothyroxine and check TSH   -statin   Anxiety   -lexapro  DVT prophylaxis - lovenox until procedure     Reviewed labs and imaging independently.     Pending: Hospice  Plan d/w the sister at bedside.   Dispo: Home vs SNF hospice, TBD

## 2024-05-25 LAB
ANION GAP SERPL CALC-SCNC: 11 MMOL/L — SIGNIFICANT CHANGE UP (ref 7–14)
BASOPHILS # BLD AUTO: 0.01 K/UL — SIGNIFICANT CHANGE UP (ref 0–0.2)
BASOPHILS NFR BLD AUTO: 0.1 % — SIGNIFICANT CHANGE UP (ref 0–1)
BUN SERPL-MCNC: 17 MG/DL — SIGNIFICANT CHANGE UP (ref 10–20)
CALCIUM SERPL-MCNC: 9.5 MG/DL — SIGNIFICANT CHANGE UP (ref 8.4–10.5)
CHLORIDE SERPL-SCNC: 101 MMOL/L — SIGNIFICANT CHANGE UP (ref 98–110)
CO2 SERPL-SCNC: 23 MMOL/L — SIGNIFICANT CHANGE UP (ref 17–32)
CREAT SERPL-MCNC: 1 MG/DL — SIGNIFICANT CHANGE UP (ref 0.7–1.5)
EGFR: 61 ML/MIN/1.73M2 — SIGNIFICANT CHANGE UP
EOSINOPHIL # BLD AUTO: 0.11 K/UL — SIGNIFICANT CHANGE UP (ref 0–0.7)
EOSINOPHIL NFR BLD AUTO: 1 % — SIGNIFICANT CHANGE UP (ref 0–8)
GLUCOSE SERPL-MCNC: 90 MG/DL — SIGNIFICANT CHANGE UP (ref 70–99)
HCT VFR BLD CALC: 27.8 % — LOW (ref 37–47)
HGB BLD-MCNC: 8.6 G/DL — LOW (ref 12–16)
IMM GRANULOCYTES NFR BLD AUTO: 0.7 % — HIGH (ref 0.1–0.3)
LYMPHOCYTES # BLD AUTO: 1.51 K/UL — SIGNIFICANT CHANGE UP (ref 1.2–3.4)
LYMPHOCYTES # BLD AUTO: 13.4 % — LOW (ref 20.5–51.1)
MAGNESIUM SERPL-MCNC: 1.9 MG/DL — SIGNIFICANT CHANGE UP (ref 1.8–2.4)
MCHC RBC-ENTMCNC: 28.7 PG — SIGNIFICANT CHANGE UP (ref 27–31)
MCHC RBC-ENTMCNC: 30.9 G/DL — LOW (ref 32–37)
MCV RBC AUTO: 92.7 FL — SIGNIFICANT CHANGE UP (ref 81–99)
MONOCYTES # BLD AUTO: 2.38 K/UL — HIGH (ref 0.1–0.6)
MONOCYTES NFR BLD AUTO: 21.1 % — HIGH (ref 1.7–9.3)
NEUTROPHILS # BLD AUTO: 7.21 K/UL — HIGH (ref 1.4–6.5)
NEUTROPHILS NFR BLD AUTO: 63.7 % — SIGNIFICANT CHANGE UP (ref 42.2–75.2)
NRBC # BLD: 0 /100 WBCS — SIGNIFICANT CHANGE UP (ref 0–0)
PLATELET # BLD AUTO: 428 K/UL — HIGH (ref 130–400)
PMV BLD: 10.3 FL — SIGNIFICANT CHANGE UP (ref 7.4–10.4)
POTASSIUM SERPL-MCNC: 5 MMOL/L — SIGNIFICANT CHANGE UP (ref 3.5–5)
POTASSIUM SERPL-SCNC: 5 MMOL/L — SIGNIFICANT CHANGE UP (ref 3.5–5)
RBC # BLD: 3 M/UL — LOW (ref 4.2–5.4)
RBC # FLD: 20 % — HIGH (ref 11.5–14.5)
SODIUM SERPL-SCNC: 135 MMOL/L — SIGNIFICANT CHANGE UP (ref 135–146)
WBC # BLD: 11.3 K/UL — HIGH (ref 4.8–10.8)
WBC # FLD AUTO: 11.3 K/UL — HIGH (ref 4.8–10.8)

## 2024-05-25 PROCEDURE — 99232 SBSQ HOSP IP/OBS MODERATE 35: CPT

## 2024-05-25 RX ADMIN — OXYCODONE HYDROCHLORIDE 5 MILLIGRAM(S): 5 TABLET ORAL at 06:06

## 2024-05-25 RX ADMIN — ENOXAPARIN SODIUM 40 MILLIGRAM(S): 100 INJECTION SUBCUTANEOUS at 05:34

## 2024-05-25 RX ADMIN — OXYCODONE HYDROCHLORIDE 5 MILLIGRAM(S): 5 TABLET ORAL at 16:05

## 2024-05-25 RX ADMIN — CHLORHEXIDINE GLUCONATE 1 APPLICATION(S): 213 SOLUTION TOPICAL at 11:43

## 2024-05-25 RX ADMIN — Medication 100 MICROGRAM(S): at 05:35

## 2024-05-25 RX ADMIN — Medication 650 MILLIGRAM(S): at 13:57

## 2024-05-25 RX ADMIN — OXYCODONE HYDROCHLORIDE 5 MILLIGRAM(S): 5 TABLET ORAL at 11:47

## 2024-05-25 RX ADMIN — Medication 650 MILLIGRAM(S): at 13:27

## 2024-05-25 RX ADMIN — OXYCODONE HYDROCHLORIDE 5 MILLIGRAM(S): 5 TABLET ORAL at 20:25

## 2024-05-25 RX ADMIN — OXYCODONE HYDROCHLORIDE 5 MILLIGRAM(S): 5 TABLET ORAL at 00:00

## 2024-05-25 RX ADMIN — OXYCODONE HYDROCHLORIDE 5 MILLIGRAM(S): 5 TABLET ORAL at 12:17

## 2024-05-25 RX ADMIN — OXYCODONE HYDROCHLORIDE 5 MILLIGRAM(S): 5 TABLET ORAL at 05:36

## 2024-05-25 RX ADMIN — ATORVASTATIN CALCIUM 40 MILLIGRAM(S): 80 TABLET, FILM COATED ORAL at 21:58

## 2024-05-25 RX ADMIN — OXYCODONE HYDROCHLORIDE 5 MILLIGRAM(S): 5 TABLET ORAL at 20:55

## 2024-05-25 NOTE — PROGRESS NOTE ADULT - ASSESSMENT
69-year-old female with PMHx of LE DVT(2019)on Eliquis, HTN, hypothyroidism, metastatic RCC(diagnosed about 5 yrs ago) s/p L nephrectomy, distal pancreatectomy and splenectomy currently not on chemo or radiationTx (follows with Dr. Voss at [facility]), Anxiety, presents to the ED with confusion, poor oral intake, generalized weakness and fall.     Talked to sister at length at bedside today 05/22/24.   Patient has been increasingly confused over the last month. She gets episodes of confusion where she can't recognize her  and seems confused. Has also had increasing difficulty with ambulation and had a fall yesterday. States she was walking to her bathroom when she tripped and fell onto her forearm(bruises noted over the forearm), Denies any head trauma or injuries elsewhere, On Eliquis(last dose taken on 5/20 PM), denies LOC. On my exam pt. was AAOx3 answered all the questions appropriately, appeared comfortable. Patient states she is been feeling more tired with poor appetite and difficulty walking within the last couple of weeks. Pt. also c/o pain all over her body. Denies fevers, chills, SOB, chest pain, nausea, vomiting, diarrhea or urinary symptoms. Of note she was recently admitted to [facility] in March for B/L LE rash which is now improved with topical steroids.     Her chemotherapy(Inylta) was stopped by her oncologist few weeks ago as it was not working her. She takes oxycodone 5mg q4 prn for pain and per daughter in law she gets more confused after taking the pain meds.    CT head showed cerebral and cerebellar atrophy, mets could not be ruled out.  CT chest non-con:   1. Multiple bilateral pulmonary nodules are identified suspicious for metastases. A nodule is noted in the right upper lobe measuring 13 x 7.2 mm. A nodule measuring 1.2 x 1.2 cm noted in the left upper lobe. Multiple smaller nodules are noted within the left lung. Additional right-sided nodules may be obscured by the large effusion and areas of atelectasis.  2. There is a large right sided pleural effusion, including a large loculation in the right mid and lower lung field measuring 6 x 6 x 5 cm. There is a small left-sided pleural effusion.    s/p 1L NS bolus, Aztreonam and Levaquin in ED.    A/p  Progressive confusion -suspected mets to brain in patient w/ RCC r/o TME r/o seizures r/o infection  -Patient needs CT imaging w/ contrast (patient and family refusing )  Large R pleural effusion and small L pleural effusion- most likely malignancy related r/o empyema   s/p fall     -agree w/ holding Eliquis, currently on Lovenox as ppx.  -CT S eval / Pulm eval   -IV abx  >> will stop abx on dc.    - No obvious source of infection at this time.     Hypercalcemia most likely 2/2 malignancy   -agree w/ iCa / PTH/ PTHrP / Vit D  -IV fluids can be dc'd  -hold Ca/Vit D    Metastatic RCC   -CT imaging w/ contrast   -Hem/Onc eval   -Palliative consult     Hypothyroidism  Dyslipidemia    -c/w levothyroxine and check TSH   -statin   Anxiety   -lexapro  DVT prophylaxis - lovenox until procedure     Overall prognosis poor.     Pending: Hospice  Plan d/w the sister at bedside.   Dispo: Home hospice on Monday.

## 2024-05-26 ENCOUNTER — RESULT REVIEW (OUTPATIENT)
Age: 69
End: 2024-05-26

## 2024-05-26 LAB — PTH RELATED PROT SERPL-MCNC: <2 PMOL/L — SIGNIFICANT CHANGE UP

## 2024-05-26 PROCEDURE — 93306 TTE W/DOPPLER COMPLETE: CPT | Mod: 26

## 2024-05-26 PROCEDURE — 99232 SBSQ HOSP IP/OBS MODERATE 35: CPT

## 2024-05-26 RX ADMIN — OXYCODONE HYDROCHLORIDE 5 MILLIGRAM(S): 5 TABLET ORAL at 16:22

## 2024-05-26 RX ADMIN — OXYCODONE HYDROCHLORIDE 5 MILLIGRAM(S): 5 TABLET ORAL at 19:58

## 2024-05-26 RX ADMIN — OXYCODONE HYDROCHLORIDE 5 MILLIGRAM(S): 5 TABLET ORAL at 06:01

## 2024-05-26 RX ADMIN — OXYCODONE HYDROCHLORIDE 5 MILLIGRAM(S): 5 TABLET ORAL at 20:30

## 2024-05-26 RX ADMIN — Medication 100 MICROGRAM(S): at 06:02

## 2024-05-26 RX ADMIN — OXYCODONE HYDROCHLORIDE 5 MILLIGRAM(S): 5 TABLET ORAL at 12:05

## 2024-05-26 RX ADMIN — ATORVASTATIN CALCIUM 40 MILLIGRAM(S): 80 TABLET, FILM COATED ORAL at 22:29

## 2024-05-26 RX ADMIN — ENOXAPARIN SODIUM 40 MILLIGRAM(S): 100 INJECTION SUBCUTANEOUS at 06:01

## 2024-05-26 RX ADMIN — OXYCODONE HYDROCHLORIDE 5 MILLIGRAM(S): 5 TABLET ORAL at 06:31

## 2024-05-26 RX ADMIN — OXYCODONE HYDROCHLORIDE 5 MILLIGRAM(S): 5 TABLET ORAL at 15:52

## 2024-05-26 RX ADMIN — OXYCODONE HYDROCHLORIDE 5 MILLIGRAM(S): 5 TABLET ORAL at 11:35

## 2024-05-26 RX ADMIN — CHLORHEXIDINE GLUCONATE 1 APPLICATION(S): 213 SOLUTION TOPICAL at 11:01

## 2024-05-26 NOTE — PROGRESS NOTE ADULT - ASSESSMENT
69-year-old female with PMHx of LE DVT(2019)on Eliquis, HTN, hypothyroidism, metastatic RCC(diagnosed about 5 yrs ago) s/p L nephrectomy, distal pancreatectomy and splenectomy currently not on chemo or radiationTx (follows with Dr. Voss at [facility]), Anxiety, presents to the ED with confusion, poor oral intake, generalized weakness and fall.     Talked to sister at length at bedside today 05/22/24.   Patient has been increasingly confused over the last month. She gets episodes of confusion where she can't recognize her  and seems confused. Has also had increasing difficulty with ambulation and had a fall yesterday. States she was walking to her bathroom when she tripped and fell onto her forearm(bruises noted over the forearm), Denies any head trauma or injuries elsewhere, On Eliquis(last dose taken on 5/20 PM), denies LOC. On my exam pt. was AAOx3 answered all the questions appropriately, appeared comfortable. Patient states she is been feeling more tired with poor appetite and difficulty walking within the last couple of weeks. Pt. also c/o pain all over her body. Denies fevers, chills, SOB, chest pain, nausea, vomiting, diarrhea or urinary symptoms. Of note she was recently admitted to [facility] in March for B/L LE rash which is now improved with topical steroids.     Her chemotherapy(Inylta) was stopped by her oncologist few weeks ago as it was not working her. She takes oxycodone 5mg q4 prn for pain and per daughter in law she gets more confused after taking the pain meds.    CT head showed cerebral and cerebellar atrophy, mets could not be ruled out.  CT chest non-con:   1. Multiple bilateral pulmonary nodules are identified suspicious for metastases. A nodule is noted in the right upper lobe measuring 13 x 7.2 mm. A nodule measuring 1.2 x 1.2 cm noted in the left upper lobe. Multiple smaller nodules are noted within the left lung. Additional right-sided nodules may be obscured by the large effusion and areas of atelectasis.  2. There is a large right sided pleural effusion, including a large loculation in the right mid and lower lung field measuring 6 x 6 x 5 cm. There is a small left-sided pleural effusion.    s/p 1L NS bolus, Aztreonam and Levaquin in ED.    A/p  Progressive confusion -suspected mets to brain in patient w/ RCC r/o TME r/o seizures r/o infection  -Patient needs CT imaging w/ contrast (patient and family refusing )  Large R pleural effusion and small L pleural effusion- most likely malignancy related r/o empyema   s/p fall     -agree w/ holding Eliquis, currently on Lovenox as ppx.  -IV abx  >> will stop abx on dc.    - No obvious source of infection at this time.     Hypercalcemia most likely 2/2 malignancy   -hold Ca/Vit D    Metastatic RCC   -CT imaging w/ contrast   -Hem/Onc eval noted.   -Palliative on board.     Hypothyroidism  Dyslipidemia    -c/w levothyroxine   -statin     Anxiety   -lexapro  DVT prophylaxis - lovenox until procedure     Overall prognosis poor.     Pending: Hospice  Plan d/w the sister at bedside.   Dispo: Home hospice on Monday.

## 2024-05-27 ENCOUNTER — TRANSCRIPTION ENCOUNTER (OUTPATIENT)
Age: 69
End: 2024-05-27

## 2024-05-27 PROCEDURE — 99232 SBSQ HOSP IP/OBS MODERATE 35: CPT

## 2024-05-27 RX ORDER — ROSUVASTATIN CALCIUM 5 MG/1
1 TABLET ORAL
Refills: 0 | DISCHARGE

## 2024-05-27 RX ORDER — ESCITALOPRAM OXALATE 10 MG/1
0 TABLET, FILM COATED ORAL
Refills: 0 | DISCHARGE

## 2024-05-27 RX ORDER — APIXABAN 2.5 MG/1
1 TABLET, FILM COATED ORAL
Qty: 0 | Refills: 0 | DISCHARGE

## 2024-05-27 RX ORDER — LEVOTHYROXINE SODIUM 125 MCG
1 TABLET ORAL
Refills: 0 | DISCHARGE

## 2024-05-27 RX ORDER — OXYCODONE HYDROCHLORIDE 5 MG/1
1 TABLET ORAL
Refills: 0 | DISCHARGE

## 2024-05-27 RX ADMIN — CHLORHEXIDINE GLUCONATE 1 APPLICATION(S): 213 SOLUTION TOPICAL at 11:54

## 2024-05-27 RX ADMIN — ATORVASTATIN CALCIUM 40 MILLIGRAM(S): 80 TABLET, FILM COATED ORAL at 21:34

## 2024-05-27 RX ADMIN — Medication 650 MILLIGRAM(S): at 16:47

## 2024-05-27 RX ADMIN — OXYCODONE HYDROCHLORIDE 5 MILLIGRAM(S): 5 TABLET ORAL at 15:17

## 2024-05-27 RX ADMIN — OXYCODONE HYDROCHLORIDE 5 MILLIGRAM(S): 5 TABLET ORAL at 18:50

## 2024-05-27 RX ADMIN — OXYCODONE HYDROCHLORIDE 5 MILLIGRAM(S): 5 TABLET ORAL at 23:09

## 2024-05-27 RX ADMIN — Medication 100 MICROGRAM(S): at 05:44

## 2024-05-27 RX ADMIN — OXYCODONE HYDROCHLORIDE 5 MILLIGRAM(S): 5 TABLET ORAL at 14:47

## 2024-05-27 RX ADMIN — OXYCODONE HYDROCHLORIDE 5 MILLIGRAM(S): 5 TABLET ORAL at 02:13

## 2024-05-27 RX ADMIN — OXYCODONE HYDROCHLORIDE 5 MILLIGRAM(S): 5 TABLET ORAL at 01:43

## 2024-05-27 RX ADMIN — OXYCODONE HYDROCHLORIDE 5 MILLIGRAM(S): 5 TABLET ORAL at 23:39

## 2024-05-27 RX ADMIN — Medication 650 MILLIGRAM(S): at 17:17

## 2024-05-27 RX ADMIN — OXYCODONE HYDROCHLORIDE 5 MILLIGRAM(S): 5 TABLET ORAL at 10:34

## 2024-05-27 RX ADMIN — Medication 650 MILLIGRAM(S): at 23:10

## 2024-05-27 RX ADMIN — ENOXAPARIN SODIUM 40 MILLIGRAM(S): 100 INJECTION SUBCUTANEOUS at 05:43

## 2024-05-27 RX ADMIN — Medication 650 MILLIGRAM(S): at 23:39

## 2024-05-27 RX ADMIN — OXYCODONE HYDROCHLORIDE 5 MILLIGRAM(S): 5 TABLET ORAL at 11:04

## 2024-05-27 NOTE — DISCHARGE NOTE PROVIDER - CARE PROVIDER_API CALL
Demetra Murray  Internal Medicine  [address] [place] [place] [address]  Phone: [phone]  Fax: ([phone]  Follow Up Time: 2 weeks

## 2024-05-27 NOTE — PROGRESS NOTE ADULT - ASSESSMENT
69-year-old female with PMHx of LE DVT(2019)on Eliquis, HTN, hypothyroidism, metastatic RCC(diagnosed about 5 yrs ago) s/p L nephrectomy, distal pancreatectomy and splenectomy currently not on chemo or radiationTx (follows with Dr. Voss at [facility]), Anxiety, presents to the ED with confusion, poor oral intake, generalized weakness and fall.     Talked to sister at length at bedside today 05/22/24.   Patient has been increasingly confused over the last month. She gets episodes of confusion where she can't recognize her  and seems confused. Has also had increasing difficulty with ambulation and had a fall yesterday. States she was walking to her bathroom when she tripped and fell onto her forearm(bruises noted over the forearm), Denies any head trauma or injuries elsewhere, On Eliquis(last dose taken on 5/20 PM), denies LOC. On my exam pt. was AAOx3 answered all the questions appropriately, appeared comfortable. Patient states she is been feeling more tired with poor appetite and difficulty walking within the last couple of weeks. Pt. also c/o pain all over her body. Denies fevers, chills, SOB, chest pain, nausea, vomiting, diarrhea or urinary symptoms. Of note she was recently admitted to [facility] in March for B/L LE rash which is now improved with topical steroids.     Her chemotherapy(Inylta) was stopped by her oncologist few weeks ago as it was not working her. She takes oxycodone 5mg q4 prn for pain and per daughter in law she gets more confused after taking the pain meds.    CT head showed cerebral and cerebellar atrophy, mets could not be ruled out.  CT chest non-con:   1. Multiple bilateral pulmonary nodules are identified suspicious for metastases. A nodule is noted in the right upper lobe measuring 13 x 7.2 mm. A nodule measuring 1.2 x 1.2 cm noted in the left upper lobe. Multiple smaller nodules are noted within the left lung. Additional right-sided nodules may be obscured by the large effusion and areas of atelectasis.  2. There is a large right sided pleural effusion, including a large loculation in the right mid and lower lung field measuring 6 x 6 x 5 cm. There is a small left-sided pleural effusion.    s/p 1L NS bolus, Aztreonam and Levaquin in ED.    A/p  Progressive confusion -suspected mets to brain in patient w/ RCC r/o TME r/o seizures r/o infection  -Patient needs CT imaging w/ contrast (patient and family refusing )  Large R pleural effusion and small L pleural effusion- most likely malignancy related r/o empyema   s/p fall     -agree w/ holding Eliquis, currently on Lovenox as ppx.  - S/P IV abx .   - No obvious source of infection at this time.     Hypercalcemia most likely 2/2 malignancy   -hold Ca/Vit D    Metastatic RCC   -CT imaging w/ contrast   -Hem/Onc eval noted.   -Palliative on board.     Hypothyroidism  Dyslipidemia    -c/w levothyroxine   -statin     Anxiety   -lexapro  DVT prophylaxis - lovenox until procedure     Overall prognosis poor.     Pending: DME/ Hospice  Dispo: Home hospice.

## 2024-05-27 NOTE — DISCHARGE NOTE PROVIDER - HOSPITAL COURSE
69-year-old female with PMHx of LE DVT(2019)on Eliquis, HTN, hypothyroidism, metastatic RCC(diagnosed about 5 yrs ago) s/p L nephrectomy, distal pancreatectomy and splenectomy currently not on chemo or radiationTx (follows with Dr. Voss at [facility]), Anxiety, presents to the ED with confusion, poor oral intake, generalized weakness and fall.     Talked to daughter in law(Arleth) over the phone, she states that patient has been increasingly confused over the last month. She gets episodes of confusion where she can't recognize her  and seems confused. Has also had increasing difficulty with ambulation and had a fall yesterday. States she was walking to her bathroom when she tripped and fell onto her forearm(bruises noted over the forearm), Denies any head trauma or injuries elsewhere, On Eliquis(last dose taken on 5/20 PM), denies LOC. On my exam pt. was AAOx3 answered all the questions appropriately, appeared comfortable. Patient states she is been feeling more tired with poor appetite and difficulty walking within the last couple of weeks. Pt. also c/o pain all over her body. Denies fevers, chills, SOB, chest pain, nausea, vomiting, diarrhea or urinary symptoms. Of note she was recently admitted to [facility] in March for B/L LE rash which is now improved with topical steroids.     Her chemotherapy(Inylta) was stopped by her oncologist few weeks ago as it was not working her. She takes oxycodone 5mg q4 prn for pain and per daughter in law she gets more confused after taking the pain meds.    In ED, her vitals were stable. satting 99% on room air  Labs significant for WBC 11.5K, Ca 12.1, , AST 45  CT head showed cerebral and cerebellar atrophy, mets could not be ruled out.  CT chest non-con:   1. Multiple bilateral pulmonary nodules are identified suspicious for metastases. A nodule is noted in the right upper lobe measuring 13 x 7.2 mm. A nodule measuring 1.2 x 1.2 cm noted in the left upper lobe. Multiple smaller nodules are noted within the left lung. Additional right-sided nodules may be obscured by the large effusion and areas of atelectasis.  2. There is a large right sided pleural effusion, including a large loculation in the right mid and lower lung field measuring 6 x 6 x 5 cm. There is a small left-sided pleural effusion.    s/p 1L NS bolus, Aztreonam and Levaquin in ED.    Progressive confusion secondary to suspected mets to brain in patient w/ RCC r/o TME r/o seizures r/o infection, Patient needed CT imaging w/ contrast (patient and family refusing)  Multiple bilateral pulmonary nodules with large R pleural effusion and small L pleural effusion also found on imaging most likely malignancy related r/o empyema. Found to have hypercalcemia most likely 2/2 malignancy Ca/Vit D were held. Hospice were consulted and arrangement for home hospice was done.     Patient is vitally stable and is ready to be discharged.          69-year-old female with PMHx of LE DVT(2019)on Eliquis, HTN, hypothyroidism, metastatic RCC(diagnosed about 5 yrs ago) s/p L nephrectomy, distal pancreatectomy and splenectomy currently not on chemo or radiationTx (follows with Dr. Voss at [facility]), Anxiety, presents to the ED with confusion, poor oral intake, generalized weakness and fall.     Talked to daughter in law(Arleth) over the phone, she states that patient has been increasingly confused over the last month. She gets episodes of confusion where she can't recognize her  and seems confused. Has also had increasing difficulty with ambulation and had a fall yesterday. States she was walking to her bathroom when she tripped and fell onto her forearm(bruises noted over the forearm), Denies any head trauma or injuries elsewhere, On Eliquis(last dose taken on 5/20 PM), denies LOC. On my exam pt. was AAOx3 answered all the questions appropriately, appeared comfortable. Patient states she is been feeling more tired with poor appetite and difficulty walking within the last couple of weeks. Pt. also c/o pain all over her body. Denies fevers, chills, SOB, chest pain, nausea, vomiting, diarrhea or urinary symptoms. Of note she was recently admitted to [facility] in March for B/L LE rash which is now improved with topical steroids.     Her chemotherapy(Inylta) was stopped by her oncologist few weeks ago as it was not working her. She takes oxycodone 5mg q4 prn for pain and per daughter in law she gets more confused after taking the pain meds.    In ED, her vitals were stable. satting 99% on room air  Labs significant for WBC 11.5K, Ca 12.1, , AST 45  CT head showed cerebral and cerebellar atrophy, mets could not be ruled out.  CT chest non-con:   1. Multiple bilateral pulmonary nodules are identified suspicious for metastases. A nodule is noted in the right upper lobe measuring 13 x 7.2 mm. A nodule measuring 1.2 x 1.2 cm noted in the left upper lobe. Multiple smaller nodules are noted within the left lung. Additional right-sided nodules may be obscured by the large effusion and areas of atelectasis.  2. There is a large right sided pleural effusion, including a large loculation in the right mid and lower lung field measuring 6 x 6 x 5 cm. There is a small left-sided pleural effusion.    s/p 1L NS bolus, Aztreonam and Levaquin in ED.    Progressive confusion secondary to suspected mets to brain in patient w/ RCC r/o TME r/o seizures r/o infection, Patient needed CT imaging w/ contrast (patient and family refusing)  Multiple bilateral pulmonary nodules with large R pleural effusion and small L pleural effusion also found on imaging most likely malignancy related r/o empyema. Found to have hypercalcemia most likely 2/2 malignancy Ca/Vit D were held. Hospice were consulted and arrangement for hospice at SNF was done.     Patient is vitally stable and is ready to be discharged.

## 2024-05-27 NOTE — DISCHARGE NOTE PROVIDER - NSDCMRMEDTOKEN_GEN_ALL_CORE_FT
levothyroxine 100 mcg (0.1 mg) oral tablet: 1 tab(s) orally once a day  polyethylene glycol 3350 oral powder for reconstitution: 17 gram(s) orally once a day   acetaminophen 325 mg oral tablet: 2 tab(s) orally every 6 hours As needed Temp greater or equal to 38C (100.4F), Mild Pain (1 - 3)  levothyroxine 100 mcg (0.1 mg) oral tablet: 1 tab(s) orally once a day  oxyCODONE 5 mg oral tablet: 1 tab(s) orally every 4 hours As needed Severe Pain (7 - 10)  polyethylene glycol 3350 oral powder for reconstitution: 17 gram(s) orally once a day   acetaminophen 325 mg oral tablet: 2 tab(s) orally every 6 hours As needed Temp greater or equal to 38C (100.4F), Mild Pain (1 - 3)  atorvastatin 40 mg oral tablet: 1 tab(s) orally once a day (at bedtime)  levothyroxine 100 mcg (0.1 mg) oral tablet: 1 tab(s) orally once a day  oxyCODONE 10 mg oral tablet: 1 tab(s) orally every 4 hours MDD: 60 mg  polyethylene glycol 3350 oral powder for reconstitution: 17 gram(s) orally once a day

## 2024-05-27 NOTE — HOSPICE CARE NOTE - CONVESATION DETAILS
Spoke with patients KATHERINE Reinoso. Will call in AM and find out why commode was not delivered as ordered. As per Arleth they did not hire a private HHA. Reinforced that [facility] are not guaranteed to start on admission to hospice. Family is expected to care for patient or private hire. As per Arleth, a friend works for a HHA company and she will reach out to her today/tomorrow and private hire.

## 2024-05-28 PROCEDURE — 99232 SBSQ HOSP IP/OBS MODERATE 35: CPT

## 2024-05-28 RX ORDER — ACETAMINOPHEN 500 MG
2 TABLET ORAL
Qty: 0 | Refills: 0 | DISCHARGE
Start: 2024-05-28

## 2024-05-28 RX ORDER — OXYCODONE HYDROCHLORIDE 5 MG/1
1 TABLET ORAL
Qty: 0 | Refills: 0 | DISCHARGE
Start: 2024-05-28

## 2024-05-28 RX ORDER — POLYETHYLENE GLYCOL 3350 17 G/17G
17 POWDER, FOR SOLUTION ORAL
Qty: 1 | Refills: 0
Start: 2024-05-28 | End: 2024-06-10

## 2024-05-28 RX ORDER — LEVOTHYROXINE SODIUM 125 MCG
1 TABLET ORAL
Qty: 0 | Refills: 0 | DISCHARGE
Start: 2024-05-28

## 2024-05-28 RX ADMIN — CHLORHEXIDINE GLUCONATE 1 APPLICATION(S): 213 SOLUTION TOPICAL at 11:21

## 2024-05-28 RX ADMIN — Medication 650 MILLIGRAM(S): at 10:15

## 2024-05-28 RX ADMIN — ATORVASTATIN CALCIUM 40 MILLIGRAM(S): 80 TABLET, FILM COATED ORAL at 21:29

## 2024-05-28 RX ADMIN — Medication 650 MILLIGRAM(S): at 18:47

## 2024-05-28 RX ADMIN — OXYCODONE HYDROCHLORIDE 5 MILLIGRAM(S): 5 TABLET ORAL at 10:14

## 2024-05-28 RX ADMIN — OXYCODONE HYDROCHLORIDE 5 MILLIGRAM(S): 5 TABLET ORAL at 14:40

## 2024-05-28 RX ADMIN — OXYCODONE HYDROCHLORIDE 5 MILLIGRAM(S): 5 TABLET ORAL at 18:47

## 2024-05-28 RX ADMIN — ENOXAPARIN SODIUM 40 MILLIGRAM(S): 100 INJECTION SUBCUTANEOUS at 05:49

## 2024-05-28 RX ADMIN — Medication 100 MICROGRAM(S): at 05:49

## 2024-05-28 NOTE — PROGRESS NOTE ADULT - ASSESSMENT
69-year-old female with PMHx of LE DVT(2019)on Eliquis, HTN, hypothyroidism, metastatic RCC(diagnosed about 5 yrs ago) s/p L nephrectomy, distal pancreatectomy and splenectomy currently not on chemo or radiationTx (follows with Dr. Voss at [facility]), Anxiety, presents to the ED with confusion, poor oral intake, generalized weakness and fall.     Talked to sister at length at bedside today 05/22/24.   Patient has been increasingly confused over the last month. She gets episodes of confusion where she can't recognize her  and seems confused. Has also had increasing difficulty with ambulation and had a fall yesterday. States she was walking to her bathroom when she tripped and fell onto her forearm(bruises noted over the forearm), Denies any head trauma or injuries elsewhere, On Eliquis(last dose taken on 5/20 PM), denies LOC. On my exam pt. was AAOx3 answered all the questions appropriately, appeared comfortable. Patient states she is been feeling more tired with poor appetite and difficulty walking within the last couple of weeks. Pt. also c/o pain all over her body. Denies fevers, chills, SOB, chest pain, nausea, vomiting, diarrhea or urinary symptoms. Of note she was recently admitted to [facility] in March for B/L LE rash which is now improved with topical steroids.     Her chemotherapy(Inylta) was stopped by her oncologist few weeks ago as it was not working her. She takes oxycodone 5mg q4 prn for pain and per daughter in law she gets more confused after taking the pain meds.    CT head showed cerebral and cerebellar atrophy, mets could not be ruled out.  CT chest non-con:   1. Multiple bilateral pulmonary nodules are identified suspicious for metastases. A nodule is noted in the right upper lobe measuring 13 x 7.2 mm. A nodule measuring 1.2 x 1.2 cm noted in the left upper lobe. Multiple smaller nodules are noted within the left lung. Additional right-sided nodules may be obscured by the large effusion and areas of atelectasis.  2. There is a large right sided pleural effusion, including a large loculation in the right mid and lower lung field measuring 6 x 6 x 5 cm. There is a small left-sided pleural effusion.    s/p 1L NS bolus, Aztreonam and Levaquin in ED.    A/p  Progressive confusion -suspected mets to brain in patient w/ RCC r/o TME r/o seizures r/o infection   >> stable now.   -Patient needs CT imaging w/ contrast (patient and family refusing )  Large R pleural effusion and small L pleural effusion- most likely malignancy related r/o empyema   s/p fall     -agree w/ holding Eliquis, currently on Lovenox as ppx.  - S/P IV abx .   - No obvious source of infection at this time.     Hypercalcemia most likely 2/2 malignancy   -hold Ca/Vit D    Metastatic RCC   -CT imaging w/ contrast   -Hem/Onc eval noted.   -Palliative on board.     Hypothyroidism  Dyslipidemia    -c/w levothyroxine   -statin     Anxiety   -lexapro  DVT prophylaxis - lovenox until procedure     Overall prognosis poor.     Pending: DME/ Hospice  Dispo: Home hospice. vs SNF

## 2024-05-28 NOTE — PROGRESS NOTE ADULT - ASSESSMENT
69-year-old female with PMHx of LE DVT(2019)on Eliquis, HTN, hypothyroidism, metastatic RCC(diagnosed about 5 yrs ago) s/p L nephrectomy, distal pancreatectomy and splenectomy currently not on chemo or radiationTx (follows with Dr. Voss at [facility]), Anxiety, presents to the ED with confusion, poor oral intake, generalized weakness and fall.     A/P  # Progressive confusion - suspected mets to brain in patient w/ RCC r/o TME r/o seizures r/o infection, improving   - Patient needs CT imaging w/ contrast (patient and family refusing )  - Large R pleural effusion and small L pleural effusion- most likely malignancy related r/o empyema    - No obvious source of infection at this time.     # Hypercalcemia most likely 2/2 malignancy   - hold Ca/Vit D    # Metastatic RCC   - CT imaging w/ contrast   - Hem/Onc eval noted.   - Palliative on board.     # Hypothyroidism  # Dyslipidemia    - c/w levothyroxine   - c/w statin     # Anxiety   -c/w lexapro    Miscellaneous:  DVT prophylaxis: Lovenox  Bowel  - Feeds: DASH/TLC  - Prophylaxis: None  - Regimen: None  Code status: Full   Pending:  Home hospice pending commode delivery

## 2024-05-28 NOTE — HOSPICE CARE NOTE - CONVESATION DETAILS
Follow up call placed to Christin to further discuss hospice services. At this time home hospice is not a conducive plan and the family is requesting SNF placement.  Christin aware there is a room and board obligation to be placed as patient does not have Medicaid and will not qualify per family. Hospice available once D/C plan is in place.

## 2024-05-29 DIAGNOSIS — R52 PAIN, UNSPECIFIED: ICD-10-CM

## 2024-05-29 PROCEDURE — 99232 SBSQ HOSP IP/OBS MODERATE 35: CPT

## 2024-05-29 RX ORDER — OXYCODONE HYDROCHLORIDE 5 MG/1
10 TABLET ORAL EVERY 4 HOURS
Refills: 0 | Status: DISCONTINUED | OUTPATIENT
Start: 2024-05-29 | End: 2024-05-30

## 2024-05-29 RX ORDER — PANTOPRAZOLE SODIUM 20 MG/1
40 TABLET, DELAYED RELEASE ORAL ONCE
Refills: 0 | Status: DISCONTINUED | OUTPATIENT
Start: 2024-05-29 | End: 2024-05-30

## 2024-05-29 RX ORDER — ONDANSETRON 8 MG/1
4 TABLET, FILM COATED ORAL ONCE
Refills: 0 | Status: DISCONTINUED | OUTPATIENT
Start: 2024-05-29 | End: 2024-05-30

## 2024-05-29 RX ORDER — OXYCODONE HYDROCHLORIDE 5 MG/1
5 TABLET ORAL EVERY 4 HOURS
Refills: 0 | Status: DISCONTINUED | OUTPATIENT
Start: 2024-05-29 | End: 2024-05-29

## 2024-05-29 RX ADMIN — OXYCODONE HYDROCHLORIDE 5 MILLIGRAM(S): 5 TABLET ORAL at 10:21

## 2024-05-29 RX ADMIN — ATORVASTATIN CALCIUM 40 MILLIGRAM(S): 80 TABLET, FILM COATED ORAL at 21:29

## 2024-05-29 RX ADMIN — Medication 650 MILLIGRAM(S): at 08:59

## 2024-05-29 RX ADMIN — ENOXAPARIN SODIUM 40 MILLIGRAM(S): 100 INJECTION SUBCUTANEOUS at 05:55

## 2024-05-29 RX ADMIN — Medication 650 MILLIGRAM(S): at 16:00

## 2024-05-29 RX ADMIN — OXYCODONE HYDROCHLORIDE 10 MILLIGRAM(S): 5 TABLET ORAL at 18:49

## 2024-05-29 RX ADMIN — Medication 650 MILLIGRAM(S): at 22:49

## 2024-05-29 RX ADMIN — Medication 650 MILLIGRAM(S): at 09:35

## 2024-05-29 RX ADMIN — OXYCODONE HYDROCHLORIDE 5 MILLIGRAM(S): 5 TABLET ORAL at 16:00

## 2024-05-29 RX ADMIN — CHLORHEXIDINE GLUCONATE 1 APPLICATION(S): 213 SOLUTION TOPICAL at 12:48

## 2024-05-29 RX ADMIN — OXYCODONE HYDROCHLORIDE 5 MILLIGRAM(S): 5 TABLET ORAL at 11:00

## 2024-05-29 RX ADMIN — OXYCODONE HYDROCHLORIDE 10 MILLIGRAM(S): 5 TABLET ORAL at 22:49

## 2024-05-29 RX ADMIN — OXYCODONE HYDROCHLORIDE 5 MILLIGRAM(S): 5 TABLET ORAL at 14:42

## 2024-05-29 RX ADMIN — Medication 650 MILLIGRAM(S): at 15:02

## 2024-05-29 RX ADMIN — Medication 100 MICROGRAM(S): at 05:54

## 2024-05-29 NOTE — PROGRESS NOTE ADULT - PROBLEM SELECTOR PLAN 3
-hospice consuled  -DNR/DNI  -ongoing GOC.
-hospice consuled  -DNR/DNI  -ongoing GOC.
-increase oxycodone to 10mg q4h PRN for pain

## 2024-05-29 NOTE — PROGRESS NOTE ADULT - PROBLEM SELECTOR PLAN 2
-No additional treatment per family/patient  -f/u heme onc  -hospice consult  -now DNR/DNI  -ongoing GOC.
-No additional treatment per family/patient  -f/u heme onc  -hospice consult  -now DNR/DNI  -ongoing GOC.
-No additional treatment per family/patient  -f/u heme onc  -hospice consult  -now DNR/DNI  -awaiting hospice disposition

## 2024-05-29 NOTE — PROGRESS NOTE ADULT - ASSESSMENT
69-year-old female with PMHx of LE DVT(2019)on Eliquis, HTN, hypothyroidism, metastatic RCC(diagnosed about 5 yrs ago) s/p L nephrectomy, distal pancreatectomy and splenectomy currently not on chemo or radiationTx (follows with Dr. Voss at [facility]), Anxiety, presents to the ED with confusion, poor oral intake, generalized weakness and fall.     A/P  # Progressive confusion - suspected mets to brain in patient w/ RCC r/o TME r/o seizures r/o infection, improving   - Patient needs CT imaging w/ contrast (patient and family refusing )  - Large R pleural effusion and small L pleural effusion- most likely malignancy related r/o empyema    - No obvious source of infection at this time.     # Hypercalcemia most likely 2/2 malignancy, resolved  - hold Ca/Vit D    # Metastatic RCC   - CT imaging w/ contrast   - Hem/Onc eval noted.   - Palliative on board.     # Hypothyroidism  - c/w levothyroxine     # Dyslipidemia    - c/w statin       Miscellaneous:  DVT prophylaxis: Lovenox  Bowel  - Feeds: DASH/TLC  - Prophylaxis: None  - Regimen: None  Code status: Full   Pending:  SNF Placement                 CONSTITUTIONAL:   Normal appearing  NEUROLOGICAL:   Alert and oriented x 3  ENT:   Airway patent, Mouth with normal mucosa.   EYES:   Pupils equal, Round and reactive to light.  CARDIAC:   Normal rate, Regular rhythm.    RESPIRATORY:   No wheezing, No crackles, Normal chest expansion, Not tachypneic, No use of accessory muscles  GASTROINTESTINAL:  Abdomen soft, Non-tender, No guarding, + BS  SKIN:   No rashes or echhymosis  EXTREMITIES:    2+ Peripheral Pulses, No clubbing, cyanosis, or edema    69-year-old female with PMHx of LE DVT(2019)on Eliquis, HTN, hypothyroidism, metastatic RCC(diagnosed about 5 yrs ago) s/p L nephrectomy, distal pancreatectomy and splenectomy currently not on chemo or radiationTx (follows with Dr. Voss at [facility]), Anxiety, presents to the ED with confusion, poor oral intake, generalized weakness and fall.     A/P  # Progressive confusion - suspected mets to brain in patient w/ RCC r/o TME r/o seizures r/o infection, improving   - Patient needs CT imaging w/ contrast (patient and family refusing )  - Large R pleural effusion and small L pleural effusion- most likely malignancy related r/o empyema    - No obvious source of infection at this time.     # Hypercalcemia most likely 2/2 malignancy, resolved  - hold Ca/Vit D    # Metastatic RCC   - CT imaging w/ contrast   - Hem/Onc eval noted.   - Palliative on board.     # Hypothyroidism  - c/w levothyroxine     # Dyslipidemia    - c/w statin       Miscellaneous:  DVT prophylaxis: Lovenox  Bowel  - Feeds: DASH/TLC  - Prophylaxis: None  - Regimen: None  Code status: Full   Pending:  SNF Placement

## 2024-05-29 NOTE — PROGRESS NOTE ADULT - PROBLEM SELECTOR PLAN 1
In setting of hypercalcemia and suspected brain mets. Improved today  -patient/family reportedly refused CT brain  -hospice consulted    Recommend non-pharmacological interventions to prevent/treat delirium  - maintain day/night light cycles  - optimize sleep-wake cycle, minimize environmental noise  - reorientation frequently  - use verbal redirection as first line  - minimize restraints and lines  - ensure good bladder/bowel function  - ensure adequate pain control  - minimize use of anticholinergic, antihistaminic, and benzodiazepine medications.
In setting of hypercalcemia and suspected brain mets. Improved today  -patient/family reportedly refused CT brain  -hospice consulted    Recommend non-pharmacological interventions to prevent/treat delirium  - maintain day/night light cycles  - optimize sleep-wake cycle, minimize environmental noise  - reorientation frequently  - use verbal redirection as first line  - minimize restraints and lines  - ensure good bladder/bowel function  - ensure adequate pain control  - minimize use of anticholinergic, antihistaminic, and benzodiazepine medications.
In setting of hypercalcemia and suspected brain mets. IMproved today  -IVF and treatment of hypercalcemia per primary team  -patient/family reportedly refused CT brain    Recommend non-pharmacological interventions to prevent/treat delirium  - maintain day/night light cycles  - optimize sleep-wake cycle, minimize environmental noise  - reorientation frequently  - use verbal redirection as first line  - minimize restraints and lines  - ensure good bladder/bowel function  - ensure adequate pain control  - minimize use of anticholinergic, antihistaminic, and benzodiazepine medications.

## 2024-05-29 NOTE — CHART NOTE - NSCHARTNOTEFT_GEN_A_CORE
PALLIATIVE MEDICINE INTERDISCIPLINARY TEAM NOTE    Provider:                                            Met with: [ x ] Patient  [ x ] Family  [   ] Other:    Primary Language: [   x] English [   ] Other*:                      *Interpretation provided by:    SUPPORT DIAGNOSES            (Check all that apply)    [   ] EOL issues  [ x  ] Advanced Illness  [   ] Cultural / spiritual concerns  [   ] Pain / suffering  [   ] Dementia / AMS  [   ] Other:  [   ] AD issues  [   ] Grief / loss / sadness  [   ] Discharge issues  [x   ] Distress / coping    PSYCHOSOCIAL ASSESSMENT OF PATIENT         (Check all that apply)    [  x ] Initial Assessment            [   ] Reassessment          [   ] Not Applicable this visit    Pain/suffering acuity:  [   x] None to mild (0-3)           [   ] Moderate (4-6)        [   ] High (7-10)    Mental Status:  [x   ] Alert/oriented (x3)          [   ] Confused/Altered(x2/x1)         [   ] Non-resp    Functional status:  [   ] Independent w ADLs      [  x ] Needs Assistance             [   ] Bedbound/Full Care    Coping:  [  x ] Coping well                     [   ] Coping w/difficulty            [   ] Poor coping    Support system:  [   ] Strong                              [ x ] Adequate                        [   ] Inadequate      Past history and medications for:     [ ] Anxiety       [ ] Depression    [ ] Sleep disorders       SERVICE PROVIDED  [   ]Discharge support / facilitation  [   ]AD / goals of care counseling                                  [   ]EOL / death / bereavement counseling  [ x  ]Counseling / support                                                [   ] Family meeting  [   ]Prayer / sacrament / ritual                                      [   ] Referral   [   ]Other                                                                       NOTE and Plan of Care (PoC):    patient is a 70 y/o F with pmhx of LE DVT, HTN, hypothyroidism, metastatic RCC, distal pancreatectomy, and splenectomy, and anxiety, presenting with confusion, poor oral intake, generalized weakness, and fall. met with patient and her sister. patient encountered resting in bed. awake and alert. reviewed role of palliative SW with them. She noted that she has some increased pain on her arm. They are working with case management and hospice with plan to go to SNF with hospice care. all questions answered. psychosocial support provided. provided patient and her sister with my contact info. discussed with palliative attending Dr. Sauceda re: increased pain. will follow. [id number]
Pt received from ED to floor w/ R EJ peripheral IV. I was informed by the nurse that CT will not take the pt down and administer IV con via EJ IV line for metastatic w/u. Pt has accessible peripheral veins in LUE but pt adamantly refusing IV placement. Pt called sister Christin who explained that multiple providers attempted to place a peripheral line unsuccessfully and caused the pt a lot of pain. Pt and Christin understood that the CTAP for metastatic workup which requires another peripheral IV for contrast administration will not be performed.

## 2024-05-29 NOTE — PROGRESS NOTE ADULT - ASSESSMENT
69-year-old female with PMHx of LE DVT(2019)on Eliquis, HTN, hypothyroidism, metastatic RCC(diagnosed about 5 yrs ago) s/p L nephrectomy, distal pancreatectomy and splenectomy currently not on chemo or radiationTx (follows with Dr. Voss at [facility]), Anxiety, presents to the ED with confusion, poor oral intake, generalized weakness and fall. Palliative care consulted for GOC.    Patient awaiting disposition to hospice.    Education about palliative care provided to patient/family.  See Recs below.    Please call [phone] with questions or concerns 24/7.   We will continue to follow.

## 2024-05-30 ENCOUNTER — TRANSCRIPTION ENCOUNTER (OUTPATIENT)
Age: 69
End: 2024-05-30

## 2024-05-30 VITALS
RESPIRATION RATE: 18 BRPM | HEART RATE: 88 BPM | SYSTOLIC BLOOD PRESSURE: 97 MMHG | OXYGEN SATURATION: 97 % | DIASTOLIC BLOOD PRESSURE: 58 MMHG | TEMPERATURE: 98 F

## 2024-05-30 PROCEDURE — 99239 HOSP IP/OBS DSCHRG MGMT >30: CPT

## 2024-05-30 RX ORDER — ATORVASTATIN CALCIUM 80 MG/1
1 TABLET, FILM COATED ORAL
Qty: 30 | Refills: 0
Start: 2024-05-30 | End: 2024-06-28

## 2024-05-30 RX ORDER — OXYCODONE HYDROCHLORIDE 5 MG/1
1 TABLET ORAL
Qty: 180 | Refills: 0
Start: 2024-05-30 | End: 2024-06-28

## 2024-05-30 RX ORDER — ONDANSETRON 8 MG/1
4 TABLET, FILM COATED ORAL THREE TIMES A DAY
Refills: 0 | Status: DISCONTINUED | OUTPATIENT
Start: 2024-05-30 | End: 2024-05-30

## 2024-05-30 RX ADMIN — Medication 650 MILLIGRAM(S): at 11:07

## 2024-05-30 RX ADMIN — Medication 100 MICROGRAM(S): at 05:01

## 2024-05-30 RX ADMIN — ENOXAPARIN SODIUM 40 MILLIGRAM(S): 100 INJECTION SUBCUTANEOUS at 05:01

## 2024-05-30 RX ADMIN — CHLORHEXIDINE GLUCONATE 1 APPLICATION(S): 213 SOLUTION TOPICAL at 11:05

## 2024-05-30 RX ADMIN — Medication 650 MILLIGRAM(S): at 05:03

## 2024-05-30 RX ADMIN — OXYCODONE HYDROCHLORIDE 10 MILLIGRAM(S): 5 TABLET ORAL at 05:03

## 2024-05-30 RX ADMIN — OXYCODONE HYDROCHLORIDE 10 MILLIGRAM(S): 5 TABLET ORAL at 11:07

## 2024-05-30 NOTE — DISCHARGE NOTE NURSING/CASE MANAGEMENT/SOCIAL WORK - PATIENT PORTAL LINK FT
You can access the FollowMyHealth Patient Portal offered by [facility] by registering at the following website: [email or web address]. By joining [email or web address] FollowMyHealth portal, you will also be able to view your health information using other applications (apps) compatible with our system.

## 2024-05-30 NOTE — PROGRESS NOTE ADULT - PROVIDER SPECIALTY LIST ADULT
Hospitalist
Internal Medicine
Hospitalist
Internal Medicine
Hospitalist
Internal Medicine
Internal Medicine
Palliative Care

## 2024-05-30 NOTE — DISCHARGE NOTE NURSING/CASE MANAGEMENT/SOCIAL WORK - NSDCPEFALRISK_GEN_ALL_CORE
For information on Fall & Injury Prevention, visit: [email or web address] OR  [email or web address] OR  https://www.cdc.gov/steadi/patient.html

## 2024-05-30 NOTE — PROGRESS NOTE ADULT - SUBJECTIVE AND OBJECTIVE BOX
JEREMYBRYANNAPARUL  69y  Female      Patient is a 69y old  Female who presents with a chief complaint of Pneumonia.     (24 May 2024 10:56)      INTERVAL HPI/OVERNIGHT EVENTS:      ******************************* REVIEW OF SYSTEMS:**********************************************    All other review of systems negative    *********************** VITALS ******************************************    T(F): 97.9 (05-28-24 @ 04:46)  HR: 82 (05-28-24 @ 04:46) (77 - 88)  BP: 96/58 (05-28-24 @ 04:46) (91/47 - 107/64)  RR: 19 (05-28-24 @ 04:46) (18 - 19)  SpO2: 94% (05-28-24 @ 07:28) (93% - 97%)    05-27-24 @ 07:01  -  05-28-24 @ 07:00  --------------------------------------------------------  IN: 0 mL / OUT: 650 mL / NET: -650 mL            05-27-24 @ 07:01  -  05-28-24 @ 07:00  --------------------------------------------------------  IN: 0 mL / OUT: 650 mL / NET: -650 mL        ******************************** PHYSICAL EXAM:**************************************************  GENERAL: NAD    PSYCH: no agitation, baseline mentation  HEENT:     NERVOUS SYSTEM:  Alert & Oriented X3,     PULMONARY: GUS, CTA    CARDIOVASCULAR: S1S2 RRR    GI: Soft, NT, ND; BS present.    EXTREMITIES:  2+ Peripheral Pulses, No clubbing, cyanosis, or edema    LYMPH: No lymphadenopathy noted    SKIN: No rashes or lesions      **************************** LABS *******************************************************                  Lactate Trend        CAPILLARY BLOOD GLUCOSE              **************************Active Medications *******************************************  fish (Unknown)  penicillins (Angioedema)      acetaminophen     Tablet .. 650 milliGRAM(s) Oral every 6 hours PRN  atorvastatin 40 milliGRAM(s) Oral at bedtime  chlorhexidine 2% Cloths 1 Application(s) Topical daily  enoxaparin Injectable 40 milliGRAM(s) SubCutaneous every 24 hours  levothyroxine 100 MICROGram(s) Oral daily  oxyCODONE    IR 5 milliGRAM(s) Oral every 4 hours PRN      ***************************************************  RADIOLOGY & ADDITIONAL TESTS:    Imaging Personally Reviewed:  [ ] YES  [ ] NO    HEALTH ISSUES - PROBLEM Dx:  Altered mental status    Renal cell carcinoma    Palliative care by specialist        
  PARUL SOTO  69y  Female      Patient is a 69y old  Female who presents with a chief complaint of altered Mental status.     INTERVAL HPI/OVERNIGHT EVENTS:      ******************************* REVIEW OF SYSTEMS:**********************************************    All other review of systems negative    *********************** VITALS ******************************************    T(F): 97.8 (05-22-24 @ 04:54)  HR: 85 (05-22-24 @ 04:54) (64 - 89)  BP: 98/59 (05-22-24 @ 04:54) (98/59 - 128/65)  RR: 18 (05-22-24 @ 04:54) (18 - 18)  SpO2: 93% (05-22-24 @ 04:54) (91% - 99%)            ******************************** PHYSICAL EXAM:**************************************************  GENERAL: NAD    PSYCH: no agitation,   HEENT:     NERVOUS SYSTEM:  Alert & Oriented X1-2    PULMONARY: GUS, CTA    CARDIOVASCULAR: S1S2 RRR    GI: Soft, NT, ND; BS present.    EXTREMITIES:  2+ Peripheral Pulses, No clubbing, cyanosis, or edema    LYMPH: No lymphadenopathy noted    SKIN: No rashes or lesions      **************************** LABS *******************************************************                          8.7    10.84 )-----------( 450      ( 22 May 2024 05:37 )             28.6     05-22    135  |  99  |  18  ----------------------------<  101<H>  5.0   |  26  |  1.0    Ca    10.2      22 May 2024 05:37  Mg     2.2     05-22    TPro  6.1  /  Alb  2.5<L>  /  TBili  0.3  /  DBili  x   /  AST  29  /  ALT  23  /  AlkPhos  247<H>  05-22      Urinalysis Basic - ( 22 May 2024 05:37 )    Color: x / Appearance: x / SG: x / pH: x  Gluc: 101 mg/dL / Ketone: x  / Bili: x / Urobili: x   Blood: x / Protein: x / Nitrite: x   Leuk Esterase: x / RBC: x / WBC x   Sq Epi: x / Non Sq Epi: x / Bacteria: x      PT/INR - ( 22 May 2024 05:37 )   PT: 22.00 sec;   INR: 1.92 ratio         PTT - ( 22 May 2024 05:37 )  PTT:37.7 sec  Lactate Trend        CAPILLARY BLOOD GLUCOSE              **************************Active Medications *******************************************  fish (Unknown)  penicillins (Angioedema)      acetaminophen     Tablet .. 650 milliGRAM(s) Oral every 6 hours PRN  atorvastatin 40 milliGRAM(s) Oral at bedtime  aztreonam  IVPB 2000 milliGRAM(s) IV Intermittent every 8 hours  aztreonam  IVPB 2000 milliGRAM(s) IV Intermittent once  chlorhexidine 2% Cloths 1 Application(s) Topical daily  enoxaparin Injectable 40 milliGRAM(s) SubCutaneous every 24 hours  levothyroxine 100 MICROGram(s) Oral daily  metroNIDAZOLE  IVPB 500 milliGRAM(s) IV Intermittent every 8 hours  oxyCODONE    IR 5 milliGRAM(s) Oral every 4 hours PRN  sodium chloride 0.9%. 1000 milliLiter(s) IV Continuous <Continuous>  vancomycin  IVPB 750 milliGRAM(s) IV Intermittent every 24 hours      ***************************************************  RADIOLOGY & ADDITIONAL TESTS:    Imaging Personally Reviewed:  [ ] YES  [ ] NO    HEALTH ISSUES - PROBLEM Dx:      
  YVETTEDERICKBRYANNAPARUL  69y  Female      Patient is a 69y old  Female who presents with a chief complaint of Pneumonia due to infectious organism     (24 May 2024 10:56)      INTERVAL HPI/OVERNIGHT EVENTS:      ******************************* REVIEW OF SYSTEMS:**********************************************    All other review of systems negative    *********************** VITALS ******************************************    T(F): 97.9 (05-25-24 @ 04:48)  HR: 85 (05-25-24 @ 04:48) (80 - 85)  BP: 105/65 (05-25-24 @ 04:48) (91/58 - 107/60)  RR: 19 (05-25-24 @ 04:48) (18 - 19)  SpO2: 94% (05-25-24 @ 04:48) (94% - 96%)            ******************************** PHYSICAL EXAM:**************************************************  GENERAL: NAD    PSYCH: no agitation, HEENT:     NERVOUS SYSTEM:  Alert & Oriented X2-3    PULMONARY: GUS, CTA    CARDIOVASCULAR: S1S2 RRR    GI: Soft, NT, ND; BS present.    EXTREMITIES:  2+ Peripheral Pulses, No clubbing, cyanosis, or edema    LYMPH: No lymphadenopathy noted    SKIN: No rashes or lesions      **************************** LABS *******************************************************                          8.6    11.30 )-----------( 428      ( 25 May 2024 06:06 )             27.8     05-25    135  |  101  |  17  ----------------------------<  90  5.0   |  23  |  1.0    Ca    9.5      25 May 2024 06:06  Mg     1.9     05-25        Urinalysis Basic - ( 25 May 2024 06:06 )    Color: x / Appearance: x / SG: x / pH: x  Gluc: 90 mg/dL / Ketone: x  / Bili: x / Urobili: x   Blood: x / Protein: x / Nitrite: x   Leuk Esterase: x / RBC: x / WBC x   Sq Epi: x / Non Sq Epi: x / Bacteria: x        Lactate Trend        CAPILLARY BLOOD GLUCOSE              **************************Active Medications *******************************************  fish (Unknown)  penicillins (Angioedema)      acetaminophen     Tablet .. 650 milliGRAM(s) Oral every 6 hours PRN  atorvastatin 40 milliGRAM(s) Oral at bedtime  chlorhexidine 2% Cloths 1 Application(s) Topical daily  enoxaparin Injectable 40 milliGRAM(s) SubCutaneous every 24 hours  levothyroxine 100 MICROGram(s) Oral daily  oxyCODONE    IR 5 milliGRAM(s) Oral every 4 hours PRN  sodium chloride 0.9%. 1000 milliLiter(s) IV Continuous <Continuous>      ***************************************************  RADIOLOGY & ADDITIONAL TESTS:    Imaging Personally Reviewed:  [ ] YES  [ ] NO    HEALTH ISSUES - PROBLEM Dx:  Altered mental status    Renal cell carcinoma    Palliative care by specialist        
24H events:    Patient is a 69y old Female who presents with a chief complaint of Pneumonia due to infectious organism     (24 May 2024 10:56)    Primary diagnosis of Altered mental status        PAST MEDICAL & SURGICAL HISTORY  Renal cell carcinoma    Hypertension    Breast cancer    Deep vein thrombosis (DVT)    H/O left nephrectomy    H/O partial adrenalectomy    History of partial pancreatectomy    S/P splenectomy    H/O lumpectomy      SOCIAL HISTORY:  Social History:      ALLERGIES:  fish (Unknown)  penicillins (Angioedema)      VITALS:   T(F): 97.9  HR: 82  BP: 96/58  RR: 19  SpO2: 94%    LABS:                            HOME MEDICATIONS:      MEDICATIONS:  STANDING MEDICATIONS  atorvastatin 40 milliGRAM(s) Oral at bedtime  chlorhexidine 2% Cloths 1 Application(s) Topical daily  enoxaparin Injectable 40 milliGRAM(s) SubCutaneous every 24 hours  levothyroxine 100 MICROGram(s) Oral daily    PRN MEDICATIONS  acetaminophen     Tablet .. 650 milliGRAM(s) Oral every 6 hours PRN  oxyCODONE    IR 5 milliGRAM(s) Oral every 4 hours PRN        
24H events:    Patient is a 69y old Female who presents with a chief complaint of Pneumonia due to infectious organism     (24 May 2024 10:56)    Primary diagnosis of Altered mental status        PAST MEDICAL & SURGICAL HISTORY  Renal cell carcinoma    Hypertension    Breast cancer    Deep vein thrombosis (DVT)    H/O left nephrectomy    H/O partial adrenalectomy    History of partial pancreatectomy    S/P splenectomy    H/O lumpectomy      SOCIAL HISTORY:  Social History:      ALLERGIES:  fish (Unknown)  penicillins (Angioedema)      VITALS:   T(F): 98.3  HR: 86  BP: 94/59  RR: 18  SpO2: 94%    LABS:                            HOME MEDICATIONS:  acetaminophen 325 mg oral tablet: 2 tab(s) orally every 6 hours As needed Temp greater or equal to 38C (100.4F), Mild Pain (1 - 3)  levothyroxine 100 mcg (0.1 mg) oral tablet: 1 tab(s) orally once a day  oxyCODONE 5 mg oral tablet: 1 tab(s) orally every 4 hours As needed Severe Pain (7 - 10)      MEDICATIONS:  STANDING MEDICATIONS  atorvastatin 40 milliGRAM(s) Oral at bedtime  chlorhexidine 2% Cloths 1 Application(s) Topical daily  enoxaparin Injectable 40 milliGRAM(s) SubCutaneous every 24 hours  levothyroxine 100 MICROGram(s) Oral daily    PRN MEDICATIONS  acetaminophen     Tablet .. 650 milliGRAM(s) Oral every 6 hours PRN  oxyCODONE    IR 5 milliGRAM(s) Oral every 4 hours PRN        
HPI:  69-year-old female with PMHx of LE DVT(2019)on Eliquis, HTN, hypothyroidism, metastatic RCC(diagnosed about 5 yrs ago) s/p L nephrectomy, distal pancreatectomy and splenectomy currently not on chemo or radiationTx (follows with Dr. Voss at [facility]), Anxiety, presents to the ED with confusion, poor oral intake, generalized weakness and fall.     Interval history  -Patient seen at bedside with sister  -Denied pain or SOB at time of visit     ADVANCE DIRECTIVES:     [x ] Full Code [ ] DNR  MOLST  [ ]  Living Will  [ ]   DECISION MAKER(s):  [ ] Health Care Proxy(s)  [x ] Surrogate(s)  [ ] Guardian           Name(s): Phone Number(s):  Sister Christin with Daughter Arleth RAIN (I)ADL(s) (prior to admission):    [place]: [ ]Total  [ ] Moderate [ ]Dependent  Palliative Performance Status Version 2:         %    http://npcrc.org/files/news/palliative_performance_scale_[email or web address].pdf    Allergies    fish (Unknown)  penicillins (Angioedema)    Intolerances    MEDICATIONS  (STANDING):  atorvastatin 40 milliGRAM(s) Oral at bedtime  cefTRIAXone   IVPB 1000 milliGRAM(s) IV Intermittent every 24 hours  chlorhexidine 2% Cloths 1 Application(s) Topical daily  enoxaparin Injectable 40 milliGRAM(s) SubCutaneous every 24 hours  levothyroxine 100 MICROGram(s) Oral daily  sodium chloride 0.9%. 1000 milliLiter(s) (150 mL/Hr) IV Continuous <Continuous>    MEDICATIONS  (PRN):  acetaminophen     Tablet .. 650 milliGRAM(s) Oral every 6 hours PRN Temp greater or equal to 38C (100.4F), Mild Pain (1 - 3)  oxyCODONE    IR 5 milliGRAM(s) Oral every 4 hours PRN Severe Pain (7 - 10)        PRESENT SYMPTOMS: [ ]Unable to obtain due to poor mentation   Source if other than patient:  [ ]Family   [ ]Team     Pain: [ ]yes [ x]no  ALL PAIN ASSESSMENT COMPONENTS WERE ASKED ABOUT UNLESS OTHERWISE NOTED  QOL impact -    Location -           Aggravating factors -   Quality -   Radiation -   Timing-   Severity (0-10 scale):  Minimal acceptable level (0-10 scale):     CPOT:    https://www.sccm.org/getattachment/[email or web address]/Critical-Care-Pain-Observation-Tool-(CPOT)    PAIN AD Score:   [email or web address] (press ctrl +  left click to view)    Dyspnea:                           [ x]None[ ]Mild [ ]Moderate [ ]Severe     Respiratory Distress Observation Scale (RDOS):   A score of 0 to 2 signifies little or no respiratory distress, 3 signifies mild distress, scores 4 to 6 indicate moderate distress, and scores greater than 7 signify severe distress  [email or web address]://[email or web address]/sites/default/files/PDFS/434740-pkufqvspqyp-iysopmcs-bcafojecgzh-xnfed.pdf    Anxiety:                             [x ]None[ ]Mild [ ]Moderate [ ]Severe   Fatigue:                             [x ]None[ ]Mild [ ]Moderate [ ]Severe   Nausea:                             [x ]None[ ]Mild [ ]Moderate [ ]Severe   Loss of appetite:              [ x]None[ ]Mild [ ]Moderate [ ]Severe   Constipation:                    [x ]None[ ]Mild [ ]Moderate [ ]Severe    Other Symptoms:  [x ]All other review of systems negative     Palliative Performance Status Version 2:         30%    http://[email or web address]/files/news/palliative_performance_scale_[email or web address].pdf    PHYSICAL EXAM:  Vital Signs Last 24 Hrs  T(C): 36.7 (24 May 2024 13:39), Max: 36.9 (23 May 2024 19:36)  T(F): 98 (24 May 2024 13:39), Max: 98.4 (23 May 2024 19:36)  HR: 84 (24 May 2024 13:39) (84 - 91)  BP: 102/66 (24 May 2024 13:39) (102/66 - 111/64)  BP(mean): --  RR: 18 (24 May 2024 13:39) (18 - 18)  SpO2: 94% (24 May 2024 13:39) (92% - 94%)    Parameters below as of 24 May 2024 04:58  Patient On (Oxygen Delivery Method): nasal cannula        GENERAL:  [ ] No acute distress [ ]Lethargic  [ ]Unarousable  [ x]Verbal  [ ]Non-Verbal [ ]Cachexia    BEHAVIORAL/PSYCH:  [x ]Alert and Oriented x2-3  [ ] Anxiety [ ] Delirium [ ] Agitation [x ] Calm   EYES: [x ] No scleral icterus [ ] Scleral icterus [ ] Closed  ENMT:  [ ]Dry mouth  [ x]No external oral lesions [ ] No external ear or nose lesions  CARDIOVASCULAR:  [ ]Regular [ ]Irregular [ ]Tachy [x ]Not Tachy  [ ]Ludwig [ ] Edema [ ] No edema  PULMONARY:  [ ]Tachypnea  [ ]Audible excessive secretions [ x] No labored breathing [ ] labored breathing  GASTROINTESTINAL: [ ]Soft  [ ]Distended  [ ]Not distended [ ]Non tender [ ]Tender  MUSCULOSKELETAL: [ ]No clubbing [ ] clubbing  [ ] No cyanosis [ ] cyanosis  NEUROLOGIC: [ ]No focal deficits  [x ]Follows commands  [ ]Does not follow commands  [ ]Cognitive impairment  [ ]Dysphagia  [ ]Dysarthria  [ ]Paresis   SKIN: [ ] Jaundiced [ x] Non-jaundiced [ ]Rash [ ]No Rash [ ] Warm [ ] Dry  MISC/LINES: [ ] ET tube [ ] Trach [ ]NGT/OGT [ ]PEG [ ]George    LABS: reviewed by me                                   8.8    10.16 )-----------( 479      ( 24 May 2024 08:11 )             27.6       05-24    134<L>  |  99  |  17  ----------------------------<  92  4.3   |  23  |  1.1    Ca    9.6      24 May 2024 08:11  Mg     1.9     05-24                Urinalysis Basic - ( 24 May 2024 08:11 )    Color: x / Appearance: x / SG: x / pH: x  Gluc: 92 mg/dL / Ketone: x  / Bili: x / Urobili: x   Blood: x / Protein: x / Nitrite: x   Leuk Esterase: x / RBC: x / WBC x   Sq Epi: x / Non Sq Epi: x / Bacteria: x                  CAPILLARY BLOOD GLUCOSE                  RADIOLOGY & ADDITIONAL STUDIES: reviewed by me    < from: CT Chest No Cont (05.21.24 @ 15:51) >  IMPRESSION:    1. Multiple bilateral pulmonary nodules are identified suspicious for   metastases. A nodule is noted in the right upper lobe measuring 13 x 7.2   mm. A nodule measuring 1.2 x 1.2 cm noted in the left upper lobe.   Multiple smaller nodules are noted within the left lung. Additional   right-sided nodules may be obscured by the large effusion and areas of   atelectasis.    2. There is a large right sided pleural effusion, including a large   loculation in the right mid and lower lung field measuring 6 x 6 x 5 cm.   There is a small left-sided pleural effusion.    < end of copied text >        EKG: reviewed by me    < from: 12 Lead ECG (05.21.24 @ 17:16) >  Ventricular Rate 81 BPM    Atrial Rate 81 BPM    P-R Interval 148 ms    QRS Duration 82 ms    Q-T Interval 368 ms    QTC Calculation(Bazett) 427 ms    P Axis 47 degrees    R Axis 47 degrees    T Axis 44 degrees    Diagnosis Line Normal sinus rhythm  Normal ECG    < end of copied text >      PROTEIN CALORIE MALNUTRITION PRESENT: [ ]mild [ ]moderate [ ]severe [ ]underweight [ ]morbid obesity  https://www.andeal.org/vault/2440/web/files/ONC/Table_Clinical%20Characteristics%20to%20Document%20Malnutrition-White%20JV%20et%20al%613853.pdf    Height (cm): 154.9 (05-21-24 @ 22:30)  Weight (kg): 52.8 (05-21-24 @ 22:30), 45.8 (03-12-24 @ 12:28)  BMI (kg/m2): 22 (05-21-24 @ 22:30)  [ ]PPSV2 < or = to 30% [ ]significant weight loss  [ ]poor nutritional intake  [ ]anasarca      [ ]Artificial Nutrition      Palliative Care Spiritual/Emotional Screening Tool Question  Severity (0-4):                    OR                    [ x] Unable to determine. Will assess at later time if appropriate.  Score of 2 or greater indicates recommendation of Chaplaincy and/or SW referral  Chaplaincy Referral: [ ] Yes [ ] Refused [ ] Following     Caregiver [place]:  [ ] Yes [ ] No    OR    [x ] Unable to determine. Will assess at later time if appropriate.  Social Work Referral [ ]  Patient and Family Centered Care Referral [ ]    Anticipatory Grief Present: [ ] Yes [ ] No    OR     [ x] Unable to determine. Will assess at later time if appropriate.  Social Work Referral [ ]  Patient and Family Centered Care Referral [ ]    Patient discussed with primary medical team MD  Palliative care education provided to patient and/or family  
  JEREMYBRYANNAPARUL  69y  Female      Patient is a 69y old  Female who presents with a chief complaint of Pneumonia due to infectious organism     (24 May 2024 10:56)      INTERVAL HPI/OVERNIGHT EVENTS:      ******************************* REVIEW OF SYSTEMS:**********************************************    All other review of systems negative    *********************** VITALS ******************************************    T(F): 98 (05-24-24 @ 13:39)  HR: 84 (05-24-24 @ 13:39) (84 - 91)  BP: 102/66 (05-24-24 @ 13:39) (102/66 - 111/64)  RR: 18 (05-24-24 @ 13:39) (18 - 18)  SpO2: 94% (05-24-24 @ 13:39) (92% - 94%)    05-23-24 @ 07:01  -  05-24-24 @ 07:00  --------------------------------------------------------  IN: 0 mL / OUT: 400 mL / NET: -400 mL            05-23-24 @ 07:01  -  05-24-24 @ 07:00  --------------------------------------------------------  IN: 0 mL / OUT: 400 mL / NET: -400 mL        ******************************** PHYSICAL EXAM:**************************************************  GENERAL: NAD    PSYCH: no agitation, baseline mentation  HEENT:     NERVOUS SYSTEM:  Alert & Oriented X2-3,     PULMONARY: GUS, CTA    CARDIOVASCULAR: S1S2 RRR    GI: Soft, NT, ND; BS present.    EXTREMITIES:  2+ Peripheral Pulses, No clubbing, cyanosis, or edema    LYMPH: No lymphadenopathy noted    SKIN: No rashes or lesions      **************************** LABS *******************************************************                          8.8    10.16 )-----------( 479      ( 24 May 2024 08:11 )             27.6     05-24    134<L>  |  99  |  17  ----------------------------<  92  4.3   |  23  |  1.1    Ca    9.6      24 May 2024 08:11  Mg     1.9     05-24        Urinalysis Basic - ( 24 May 2024 08:11 )    Color: x / Appearance: x / SG: x / pH: x  Gluc: 92 mg/dL / Ketone: x  / Bili: x / Urobili: x   Blood: x / Protein: x / Nitrite: x   Leuk Esterase: x / RBC: x / WBC x   Sq Epi: x / Non Sq Epi: x / Bacteria: x        Lactate Trend        CAPILLARY BLOOD GLUCOSE              **************************Active Medications *******************************************  fish (Unknown)  penicillins (Angioedema)      acetaminophen     Tablet .. 650 milliGRAM(s) Oral every 6 hours PRN  atorvastatin 40 milliGRAM(s) Oral at bedtime  cefTRIAXone   IVPB 1000 milliGRAM(s) IV Intermittent every 24 hours  chlorhexidine 2% Cloths 1 Application(s) Topical daily  enoxaparin Injectable 40 milliGRAM(s) SubCutaneous every 24 hours  levothyroxine 100 MICROGram(s) Oral daily  oxyCODONE    IR 5 milliGRAM(s) Oral every 4 hours PRN  sodium chloride 0.9%. 1000 milliLiter(s) IV Continuous <Continuous>      ***************************************************  RADIOLOGY & ADDITIONAL TESTS:    Imaging Personally Reviewed:  [ ] YES  [ ] NO    HEALTH ISSUES - PROBLEM Dx:  Altered mental status    Renal cell carcinoma    Palliative care by specialist        
  JEREMYBRYANNAPARUL  69y  Female      Patient is a 69y old  Female who presents with a chief complaint of Pneumonia.     (24 May 2024 10:56)      INTERVAL HPI/OVERNIGHT EVENTS:      ******************************* REVIEW OF SYSTEMS:**********************************************  Feeling better today.   All other review of systems negative    *********************** VITALS ******************************************    T(F): 98.7 (05-27-24 @ 04:25)  HR: 88 (05-27-24 @ 04:25) (72 - 98)  BP: 95/57 (05-27-24 @ 04:25) (92/54 - 103/61)  RR: 18 (05-27-24 @ 04:25) (18 - 18)  SpO2: 97% (05-27-24 @ 08:00) (93% - 97%)            ******************************** PHYSICAL EXAM:**************************************************  GENERAL: NAD    PSYCH: no agitation, baseline mentation  HEENT:     NERVOUS SYSTEM:  Alert & Oriented X3,    PULMONARY: GUS, CTA    CARDIOVASCULAR: S1S2 RRR    GI: Soft, NT, ND; BS present.    EXTREMITIES:  2+ Peripheral Pulses, No clubbing, cyanosis, or edema    LYMPH: No lymphadenopathy noted    SKIN: No rashes or lesions      **************************** LABS *******************************************************                  Lactate Trend        CAPILLARY BLOOD GLUCOSE              **************************Active Medications *******************************************  fish (Unknown)  penicillins (Angioedema)      acetaminophen     Tablet .. 650 milliGRAM(s) Oral every 6 hours PRN  atorvastatin 40 milliGRAM(s) Oral at bedtime  chlorhexidine 2% Cloths 1 Application(s) Topical daily  enoxaparin Injectable 40 milliGRAM(s) SubCutaneous every 24 hours  levothyroxine 100 MICROGram(s) Oral daily  oxyCODONE    IR 5 milliGRAM(s) Oral every 4 hours PRN      ***************************************************  RADIOLOGY & ADDITIONAL TESTS:    Imaging Personally Reviewed:  [ ] YES  [ ] NO    HEALTH ISSUES - PROBLEM Dx:  Altered mental status    Renal cell carcinoma    Palliative care by specialist        
  PARUL SOTO  69y  Female      Patient is a 69y old  Female who presents with a chief complaint of confusion.     INTERVAL HPI/OVERNIGHT EVENTS:      ******************************* REVIEW OF SYSTEMS:**********************************************    All other review of systems negative    *********************** VITALS ******************************************    T(F): 97.8 (05-23-24 @ 04:15)  HR: 84 (05-23-24 @ 04:15) (83 - 85)  BP: 101/62 (05-23-24 @ 04:15) (95/61 - 104/69)  RR: 18 (05-23-24 @ 04:15) (18 - 18)  SpO2: 91% (05-23-24 @ 04:15) (90% - 91%)            ******************************** PHYSICAL EXAM:**************************************************  GENERAL: NAD    PSYCH: no agitation,   HEENT:     NERVOUS SYSTEM:  Alert & Oriented X2-3,     PULMONARY: GUS, CTA    CARDIOVASCULAR: S1S2 RRR    GI: Soft, NT, ND; BS present.    EXTREMITIES:  2+ Peripheral Pulses, No clubbing, cyanosis, or edema    LYMPH: No lymphadenopathy noted    SKIN: No rashes or lesions      **************************** LABS *******************************************************                          9.2    10.80 )-----------( 493      ( 23 May 2024 04:30 )             29.6     05-23    134<L>  |  101  |  16  ----------------------------<  72  4.6   |  20  |  1.1    Ca    9.8      23 May 2024 04:30  Mg     1.9     05-23    TPro  6.1  /  Alb  2.5<L>  /  TBili  0.3  /  DBili  x   /  AST  29  /  ALT  23  /  AlkPhos  247<H>  05-22      Urinalysis Basic - ( 23 May 2024 04:30 )    Color: x / Appearance: x / SG: x / pH: x  Gluc: 72 mg/dL / Ketone: x  / Bili: x / Urobili: x   Blood: x / Protein: x / Nitrite: x   Leuk Esterase: x / RBC: x / WBC x   Sq Epi: x / Non Sq Epi: x / Bacteria: x      PT/INR - ( 22 May 2024 05:37 )   PT: 22.00 sec;   INR: 1.92 ratio         PTT - ( 22 May 2024 05:37 )  PTT:37.7 sec  Lactate Trend        CAPILLARY BLOOD GLUCOSE              **************************Active Medications *******************************************  fish (Unknown)  penicillins (Angioedema)      acetaminophen     Tablet .. 650 milliGRAM(s) Oral every 6 hours PRN  atorvastatin 40 milliGRAM(s) Oral at bedtime  cefTRIAXone   IVPB 1000 milliGRAM(s) IV Intermittent every 24 hours  chlorhexidine 2% Cloths 1 Application(s) Topical daily  enoxaparin Injectable 40 milliGRAM(s) SubCutaneous every 24 hours  levothyroxine 100 MICROGram(s) Oral daily  oxyCODONE    IR 5 milliGRAM(s) Oral every 4 hours PRN  sodium chloride 0.9%. 1000 milliLiter(s) IV Continuous <Continuous>      ***************************************************  RADIOLOGY & ADDITIONAL TESTS:    Imaging Personally Reviewed:  [ ] YES  [ ] NO    HEALTH ISSUES - PROBLEM Dx:  Altered mental status    Renal cell carcinoma    Palliative care by specialist        
24H events:    Patient is a 69y old Female who presents with a chief complaint of   Primary diagnosis of Pneumonia      Day 1:  Day 2:  Day 3:     Today is hospital day 2d. This morning patient was seen and examined at bedside, resting comfortably in bed.    No acute or major events overnight.    Code Status:    Family communication:  Contact date:  Name of person contacted:  Relationship to patient:  Communication details:  What matters most:    PAST MEDICAL & SURGICAL HISTORY  Renal cell carcinoma    Hypertension    Breast cancer    Deep vein thrombosis (DVT)    H/O left nephrectomy    H/O partial adrenalectomy    History of partial pancreatectomy    S/P splenectomy    H/O lumpectomy      SOCIAL HISTORY:  Social History:      ALLERGIES:  fish (Unknown)  penicillins (Angioedema)    MEDICATIONS:  STANDING MEDICATIONS  atorvastatin 40 milliGRAM(s) Oral at bedtime  cefTRIAXone   IVPB 1000 milliGRAM(s) IV Intermittent every 24 hours  chlorhexidine 2% Cloths 1 Application(s) Topical daily  enoxaparin Injectable 40 milliGRAM(s) SubCutaneous every 24 hours  levothyroxine 100 MICROGram(s) Oral daily  sodium chloride 0.9%. 1000 milliLiter(s) IV Continuous <Continuous>    PRN MEDICATIONS  acetaminophen     Tablet .. 650 milliGRAM(s) Oral every 6 hours PRN  oxyCODONE    IR 5 milliGRAM(s) Oral every 4 hours PRN    VITALS:   T(F): 97.8  HR: 84  BP: 101/62  RR: 18  SpO2: 91%    PHYSICAL EXAM:  GENERAL:   (x  ) NAD, lying in bed comfortably     (  ) obtunded     (  ) lethargic     (  ) somnolent    HEAD:   (x  ) Atraumatic     (  ) hematoma     (  ) laceration (specify location:       )     NECK:  ( x ) Supple     (  ) neck stiffness     (  ) nuchal rigidity     (  )  no JVD     (  ) JVD present ( -- cm)    HEART:  Rate -->     ( x ) normal rate     (  ) bradycardic     (  ) tachycardic  Rhythm -->     ( x ) regular     (  ) regularly irregular     (  ) irregularly irregular  Murmurs -->     (  ) normal s1s2     (  ) systolic murmur     (  ) diastolic murmur     (  ) continuous murmur      (  ) S3 present     (  ) S4 present    LUNGS:   (x  )Unlabored respirations     (  ) tachypnea  ( x ) B/L air entry     (  ) decreased breath sounds in:  (location     )    (  ) no adventitious sound     (  ) crackles     (  ) wheezing      (  ) rhonchi      (specify location:       )  (  ) chest wall tenderness (specify location:       )    ABDOMEN:   ( x ) Soft     (  ) tense   |   (  ) nondistended     (  ) distended   |   (  ) +BS     (  ) hypoactive bowel sounds     (  ) hyperactive bowel sounds  (x  ) nontender     (  ) RUQ tenderness     (  ) RLQ tenderness     (  ) LLQ tenderness     (  ) epigastric tenderness     (  ) diffuse tenderness  (  ) Splenomegaly      (  ) Hepatomegaly      (  ) Jaundice     (  ) ecchymosis     EXTREMITIES:  ( x ) Normal     (  ) Rash     (  ) ecchymosis     (  ) varicose veins      (  ) pitting edema     (  ) non-pitting edema   (  ) ulceration     (  ) gangrene:     (location:     )    NERVOUS SYSTEM:    (x  ) A&Ox3     (  ) confused     (  ) lethargic  CN II-XII:     (  ) Intact     (  ) deficits found     (Specify:     )   Upper extremities:     (  ) no sensorimotor deficits     (  ) weakness     (  ) loss of proprioception/vibration     (  ) loss of touch/temperature (specify:    )  Lower extremities:     (  ) no sensorimotor deficits     (  ) weakness     (  ) loss of proprioception/vibration     (  ) loss of touch/temperature (specify:    )    SKIN:   ( x ) No rashes or lesions     (  ) maculopapular rash     (  ) pustules     (  ) vesicles     (  ) ulcer     (  ) ecchymosis     (specify location:     )    AMPAC score:    (  ) Indwelling George Catheter:   Date insterted:    Reason (  ) Critical illness     (  ) urinary retention    (  ) Accurate Ins/Outs Monitoring     (  ) CMO patient    (  ) Central Line:   Date inserted:  Location: (  ) Right IJ     (  ) Left IJ     (  ) Right Fem     (  ) Left Fem    (  ) SPC        (  ) pigtail       (  ) PEG tube       (  ) colostomy       (  ) jejunostomy  (  ) U-Dall    LABS:                        9.2    10.80 )-----------( 493      ( 23 May 2024 04:30 )             29.6     05-22    135  |  99  |  18  ----------------------------<  101<H>  5.0   |  26  |  1.0    Ca    10.2      22 May 2024 05:37  Mg     2.2     05-22    TPro  6.1  /  Alb  2.5<L>  /  TBili  0.3  /  DBili  x   /  AST  29  /  ALT  23  /  AlkPhos  247<H>  05-22    PT/INR - ( 22 May 2024 05:37 )   PT: 22.00 sec;   INR: 1.92 ratio         PTT - ( 22 May 2024 05:37 )  PTT:37.7 sec  Urinalysis Basic - ( 22 May 2024 05:37 )    Color: x / Appearance: x / SG: x / pH: x  Gluc: 101 mg/dL / Ketone: x  / Bili: x / Urobili: x   Blood: x / Protein: x / Nitrite: x   Leuk Esterase: x / RBC: x / WBC x   Sq Epi: x / Non Sq Epi: x / Bacteria: x                RADIOLOGY:          
24H events:    Patient is a 69y old Female who presents with a chief complaint of   Primary diagnosis of Pneumonia      Day 1:  Day 2:  Day 3:     Today is hospital day 3d. This morning patient was seen and examined at bedside, resting comfortably in bed.    No acute or major events overnight.    Code Status:    Family communication:  Contact date:  Name of person contacted:  Relationship to patient:  Communication details:  What matters most:    PAST MEDICAL & SURGICAL HISTORY  Renal cell carcinoma    Hypertension    Breast cancer    Deep vein thrombosis (DVT)    H/O left nephrectomy    H/O partial adrenalectomy    History of partial pancreatectomy    S/P splenectomy    H/O lumpectomy      SOCIAL HISTORY:  Social History:      ALLERGIES:  fish (Unknown)  penicillins (Angioedema)    MEDICATIONS:  STANDING MEDICATIONS  atorvastatin 40 milliGRAM(s) Oral at bedtime  cefTRIAXone   IVPB 1000 milliGRAM(s) IV Intermittent every 24 hours  chlorhexidine 2% Cloths 1 Application(s) Topical daily  enoxaparin Injectable 40 milliGRAM(s) SubCutaneous every 24 hours  levothyroxine 100 MICROGram(s) Oral daily  sodium chloride 0.9%. 1000 milliLiter(s) IV Continuous <Continuous>    PRN MEDICATIONS  acetaminophen     Tablet .. 650 milliGRAM(s) Oral every 6 hours PRN  oxyCODONE    IR 5 milliGRAM(s) Oral every 4 hours PRN    VITALS:   T(F): 98  HR: 91  BP: 111/64  RR: 18  SpO2: 92%    PHYSICAL EXAM:  GENERAL:   (x  ) NAD, lying in bed comfortably     (  ) obtunded     (  ) lethargic     (  ) somnolent    HEAD:   (x  ) Atraumatic     (  ) hematoma     (  ) laceration (specify location:       )     NECK:  (x  ) Supple     (  ) neck stiffness     (  ) nuchal rigidity     (  )  no JVD     (  ) JVD present ( -- cm)    HEART:  Rate -->     ( x ) normal rate     (  ) bradycardic     (  ) tachycardic  Rhythm -->     ( x ) regular     (  ) regularly irregular     (  ) irregularly irregular  Murmurs -->     (  ) normal s1s2     (  ) systolic murmur     (  ) diastolic murmur     (  ) continuous murmur      (  ) S3 present     (  ) S4 present    LUNGS:   (x  )Unlabored respirations     (  ) tachypnea  (x  ) B/L air entry     (  ) decreased breath sounds in:  (location     )    (  ) no adventitious sound     (  ) crackles     (  ) wheezing      (  ) rhonchi      (specify location:       )  (  ) chest wall tenderness (specify location:       )    ABDOMEN:   ( x ) Soft     (  ) tense   |   (x  ) nondistended     (  ) distended   |   (  ) +BS     (  ) hypoactive bowel sounds     (  ) hyperactive bowel sounds  (x  ) nontender     (  ) RUQ tenderness     (  ) RLQ tenderness     (  ) LLQ tenderness     (  ) epigastric tenderness     (  ) diffuse tenderness  (  ) Splenomegaly      (  ) Hepatomegaly      (  ) Jaundice     (  ) ecchymosis     EXTREMITIES:  (x  ) Normal     (  ) Rash     (  ) ecchymosis     (  ) varicose veins      (  ) pitting edema     (  ) non-pitting edema   (  ) ulceration     (  ) gangrene:     (location:     )    NERVOUS SYSTEM:    (x  ) A&Ox3     (  ) confused     (  ) lethargic  CN II-XII:     (  ) Intact     (  ) deficits found     (Specify:     )   Upper extremities:     (  ) no sensorimotor deficits     (  ) weakness     (  ) loss of proprioception/vibration     (  ) loss of touch/temperature (specify:    )  Lower extremities:     (  ) no sensorimotor deficits     (  ) weakness     (  ) loss of proprioception/vibration     (  ) loss of touch/temperature (specify:    )    SKIN:   (x  ) No rashes or lesions     (  ) maculopapular rash     (  ) pustules     (  ) vesicles     (  ) ulcer     (  ) ecchymosis     (specify location:     )    AMPAC score:    (  ) Indwelling George Catheter:   Date insterted:    Reason (  ) Critical illness     (  ) urinary retention    (  ) Accurate Ins/Outs Monitoring     (  ) CMO patient    (  ) Central Line:   Date inserted:  Location: (  ) Right IJ     (  ) Left IJ     (  ) Right Fem     (  ) Left Fem    (  ) SPC        (  ) pigtail       (  ) PEG tube       (  ) colostomy       (  ) jejunostomy  (  ) U-Dall    LABS:                        9.2    10.80 )-----------( 493      ( 23 May 2024 04:30 )             29.6     05-23    134<L>  |  101  |  16  ----------------------------<  72  4.6   |  20  |  1.1    Ca    9.8      23 May 2024 04:30  Mg     1.9     05-23        Urinalysis Basic - ( 23 May 2024 04:30 )    Color: x / Appearance: x / SG: x / pH: x  Gluc: 72 mg/dL / Ketone: x  / Bili: x / Urobili: x   Blood: x / Protein: x / Nitrite: x   Leuk Esterase: x / RBC: x / WBC x   Sq Epi: x / Non Sq Epi: x / Bacteria: x                RADIOLOGY:          
pt seen and examined.     My notes supersede resident's notes in case of discrepancy       ROS: no cp, no sob, no n/v, no fever    Vital Signs Last 24 Hrs  T(C): 36.7 (30 May 2024 04:53), Max: 37.1 (29 May 2024 20:49)  T(F): 98.1 (30 May 2024 04:53), Max: 98.8 (29 May 2024 20:49)  HR: 88 (30 May 2024 04:53) (85 - 93)  BP: 97/58 (30 May 2024 04:53) (93/56 - 97/58)  RR: 18 (30 May 2024 04:53) (18 - 18)  SpO2: 97% (30 May 2024 04:53) (94% - 97%)    physical exam  constitutional NAD, AAOX3, Respiratory  lungs CTA, CVS heart RRR, GI: abdomen Soft NT, ND, BS+, skin: intact  neuro exam: unable to ambulate     MEDICATIONS  (STANDING):  atorvastatin 40 milliGRAM(s) Oral at bedtime  chlorhexidine 2% Cloths 1 Application(s) Topical daily  enoxaparin Injectable 40 milliGRAM(s) SubCutaneous every 24 hours  levothyroxine 100 MICROGram(s) Oral daily  pantoprazole    Tablet 40 milliGRAM(s) Oral once    MEDICATIONS  (PRN):  acetaminophen     Tablet .. 650 milliGRAM(s) Oral every 6 hours PRN Temp greater or equal to 38C (100.4F), Mild Pain (1 - 3)  ondansetron Injectable 4 milliGRAM(s) IV Push three times a day PRN Nausea and/or Vomiting  oxyCODONE    IR 10 milliGRAM(s) Oral every 4 hours PRN severe pain (7-10)    a/p    69-year-old female with PMHx of LE DVT(2019)on Eliquis, HTN, hypothyroidism, metastatic RCC(diagnosed about 5 yrs ago) s/p L nephrectomy, distal pancreatectomy and splenectomy currently not on chemo or radiationTx (follows with Dr. Voss at [facility]), Anxiety, presents to the ED with confusion, poor oral intake, generalized weakness and fall.    at this time pt is not confused . she is very alert and oriented x4    # metastatic RCC,   plan is hospice in [place], awaiting financial screening,     # hx of DVT , off eliquis, since pt is going on hospice   # hypertension, now hypotensive, not on any meds   # hypothyroidism, subclinical hypothyroidism , no need to change the dose of levothyroxine, pt is asymptomatic     Thyroid Stimulating Hormone, Serum: 36.70 uIU/mL (05.22.24 @ 05:37)  Free Thyroxine, Serum: 1.1 ng/dL (05.22.24 @ 05:37)    #Progress Note Handoff    Pending :  discharge to snf  Family discussion: parish pt   Disposition: snf   code status: dnr, dni   time spent 35 min                                     
pt seen and examined.     My notes supersede resident's notes in case of discrepancy       ROS: no cp, no sob, no n/v, no fever    Vital Signs Last 24 Hrs  T(C): 36.8 (29 May 2024 05:00), Max: 37.1 (28 May 2024 13:25)  T(F): 98.3 (29 May 2024 05:00), Max: 98.7 (28 May 2024 13:25)  HR: 86 (29 May 2024 05:00) (86 - 92)  BP: 94/59 (29 May 2024 05:00) (93/53 - 101/63)  RR: 18 (29 May 2024 05:00) (18 - 19)  SpO2: 94% (29 May 2024 05:00) (94% - 96%)    physical exam  constitutional NAD, AAOX3, Respiratory  lungs CTA, CVS heart RRR, GI: abdomen Soft NT, ND, BS+, skin: intact  neuro exam very limited ambulation     MEDICATIONS  (STANDING):  atorvastatin 40 milliGRAM(s) Oral at bedtime  chlorhexidine 2% Cloths 1 Application(s) Topical daily  enoxaparin Injectable 40 milliGRAM(s) SubCutaneous every 24 hours  levothyroxine 100 MICROGram(s) Oral daily    MEDICATIONS  (PRN):  acetaminophen     Tablet .. 650 milliGRAM(s) Oral every 6 hours PRN Temp greater or equal to 38C (100.4F), Mild Pain (1 - 3)  oxyCODONE    IR 5 milliGRAM(s) Oral every 4 hours PRN Severe Pain (7 - 10)    a/p  HPI:  69-year-old female with PMHx of LE DVT(2019)on Eliquis, HTN, hypothyroidism, metastatic RCC(diagnosed about 5 yrs ago) s/p L nephrectomy, distal pancreatectomy and splenectomy currently not on chemo or radiationTx (follows with Dr. Voss at [facility]), Anxiety, presents to the ED with confusion, poor oral intake, generalized weakness and fall.    at this time pt is not confused . she is very alert and oriented x4    # metastatic RCC,   plan is hospice in [place], awaiting financial screening,     # hx of DVT , off eliquis, since pt is going on hospice   # hypertension, now hypotensive, not on any meds   # hypothyroidism, subclinical hypothyroidism , no need to change the dose of levothyroxine, pt is asymptomatic     Thyroid Stimulating Hormone, Serum: 36.70 uIU/mL (05.22.24 @ 05:37)  Free Thyroxine, Serum: 1.1 ng/dL (05.22.24 @ 05:37)    #Progress Note Handoff    Pending :  discharge planning   Family discussion: dw pt   Disposition: snf   code status: dnr, dni                                                 
  YVETTEDERICKBRYANNAPARUL  69y  Female      Patient is a 69y old  Female who presents with a chief complaint of Pneumonia due to infectious organism.     (24 May 2024 10:56)      INTERVAL HPI/OVERNIGHT EVENTS:      ******************************* REVIEW OF SYSTEMS:**********************************************    All other review of systems negative    *********************** VITALS ******************************************    T(F): 99.3 (05-26-24 @ 04:35)  HR: 85 (05-26-24 @ 04:35) (74 - 85)  BP: 103/66 (05-26-24 @ 04:35) (92/58 - 103/66)  RR: 18 (05-26-24 @ 04:35) (18 - 18)  SpO2: 95% (05-26-24 @ 04:35) (95% - 98%)    05-25-24 @ 07:01  -  05-26-24 @ 07:00  --------------------------------------------------------  IN: 0 mL / OUT: 400 mL / NET: -400 mL            05-25-24 @ 07:01  -  05-26-24 @ 07:00  --------------------------------------------------------  IN: 0 mL / OUT: 400 mL / NET: -400 mL        ******************************** PHYSICAL EXAM:**************************************************  GENERAL: NAD    PSYCH: no agitation, baseline mentation  HEENT:     NERVOUS SYSTEM:  Alert & Oriented X3, had complete conversation at bedside.      PULMONARY: GUS, CTA    CARDIOVASCULAR: S1S2 RRR    GI: Soft, NT, ND; BS present.    EXTREMITIES:  2+ Peripheral Pulses, No clubbing, cyanosis, or edema    LYMPH: No lymphadenopathy noted    SKIN: No rashes or lesions      **************************** LABS *******************************************************                          8.6    11.30 )-----------( 428      ( 25 May 2024 06:06 )             27.8     05-25    135  |  101  |  17  ----------------------------<  90  5.0   |  23  |  1.0    Ca    9.5      25 May 2024 06:06  Mg     1.9     05-25        Urinalysis Basic - ( 25 May 2024 06:06 )    Color: x / Appearance: x / SG: x / pH: x  Gluc: 90 mg/dL / Ketone: x  / Bili: x / Urobili: x   Blood: x / Protein: x / Nitrite: x   Leuk Esterase: x / RBC: x / WBC x   Sq Epi: x / Non Sq Epi: x / Bacteria: x        Lactate Trend        CAPILLARY BLOOD GLUCOSE              **************************Active Medications *******************************************  fish (Unknown)  penicillins (Angioedema)      acetaminophen     Tablet .. 650 milliGRAM(s) Oral every 6 hours PRN  atorvastatin 40 milliGRAM(s) Oral at bedtime  chlorhexidine 2% Cloths 1 Application(s) Topical daily  enoxaparin Injectable 40 milliGRAM(s) SubCutaneous every 24 hours  levothyroxine 100 MICROGram(s) Oral daily  oxyCODONE    IR 5 milliGRAM(s) Oral every 4 hours PRN  sodium chloride 0.9%. 1000 milliLiter(s) IV Continuous <Continuous>      ***************************************************  RADIOLOGY & ADDITIONAL TESTS:    Imaging Personally Reviewed:  [ ] YES  [ ] NO    HEALTH ISSUES - PROBLEM Dx:  Altered mental status    Renal cell carcinoma    Palliative care by specialist        
24H events:    Patient is a 69y old Female who presents with a chief complaint of Pneumonia due to infectious organism     (24 May 2024 10:56)    Primary diagnosis of Altered mental status        PAST MEDICAL & SURGICAL HISTORY  Renal cell carcinoma    Hypertension    Breast cancer    Deep vein thrombosis (DVT)    H/O left nephrectomy    H/O partial adrenalectomy    History of partial pancreatectomy    S/P splenectomy    H/O lumpectomy      SOCIAL HISTORY:  Social History:      ALLERGIES:  fish (Unknown)  penicillins (Angioedema)      VITALS:   T(F): 98.1  HR: 88  BP: 97/58  RR: 18  SpO2: 97%    LABS:                            HOME MEDICATIONS:  acetaminophen 325 mg oral tablet: 2 tab(s) orally every 6 hours As needed Temp greater or equal to 38C (100.4F), Mild Pain (1 - 3)  levothyroxine 100 mcg (0.1 mg) oral tablet: 1 tab(s) orally once a day  oxyCODONE 5 mg oral tablet: 1 tab(s) orally every 4 hours As needed Severe Pain (7 - 10)      MEDICATIONS:  STANDING MEDICATIONS  atorvastatin 40 milliGRAM(s) Oral at bedtime  chlorhexidine 2% Cloths 1 Application(s) Topical daily  enoxaparin Injectable 40 milliGRAM(s) SubCutaneous every 24 hours  levothyroxine 100 MICROGram(s) Oral daily  pantoprazole    Tablet 40 milliGRAM(s) Oral once    PRN MEDICATIONS  acetaminophen     Tablet .. 650 milliGRAM(s) Oral every 6 hours PRN  ondansetron Injectable 4 milliGRAM(s) IV Push three times a day PRN  oxyCODONE    IR 10 milliGRAM(s) Oral every 4 hours PRN        
24H events:    Patient is a 69y old Female who presents with a chief complaint of Pneumonia due to infectious organism     (24 May 2024 10:56)    Primary diagnosis of Pneumonia        PAST MEDICAL & SURGICAL HISTORY  Renal cell carcinoma    Hypertension    Breast cancer    Deep vein thrombosis (DVT)    H/O left nephrectomy    H/O partial adrenalectomy    History of partial pancreatectomy    S/P splenectomy    H/O lumpectomy      SOCIAL HISTORY:  Social History:      ALLERGIES:  fish (Unknown)  penicillins (Angioedema)      VITALS:   T(F): 98.7  HR: 88  BP: 95/57  RR: 18  SpO2: 97%    LABS:                            HOME MEDICATIONS:  CALCIUM 600 MG-VIT D3 5 MCG TB: 2 tab(s) orally once a day  Crestor 10 mg oral tablet: 1 tab(s) orally once a day  ELIQUIS 2.5 MG TABLET: 1 tab(s) orally 2 times a day  Lexapro 10 mg oral tablet: orally Family unsure about the dose, please confirm in the morning  oxyCODONE 5 mg oral tablet: 1 tab(s) orally 4 times a day as needed for  pain  Synthroid 100 mcg (0.1 mg) oral tablet: 1 tab(s) orally once a day      MEDICATIONS:  STANDING MEDICATIONS  atorvastatin 40 milliGRAM(s) Oral at bedtime  chlorhexidine 2% Cloths 1 Application(s) Topical daily  enoxaparin Injectable 40 milliGRAM(s) SubCutaneous every 24 hours  levothyroxine 100 MICROGram(s) Oral daily    PRN MEDICATIONS  acetaminophen     Tablet .. 650 milliGRAM(s) Oral every 6 hours PRN  oxyCODONE    IR 5 milliGRAM(s) Oral every 4 hours PRN        
HPI:  69-year-old female with PMHx of LE DVT(2019)on Eliquis, HTN, hypothyroidism, metastatic RCC(diagnosed about 5 yrs ago) s/p L nephrectomy, distal pancreatectomy and splenectomy currently not on chemo or radiationTx (follows with Dr. Voss at [facility]), Anxiety, presents to the ED with confusion, poor oral intake, generalized weakness and fall.     Interval history  -Patient seen at bedside  -Endorsed increased pain in arms and buttocks no responsive to oxy 5     ADVANCE DIRECTIVES:     [ ] Full Code [x ] DNR  MOLST  [ ]  Living Will  [ ]   DECISION MAKER(s):  [ ] Health Care Proxy(s)  [x ] Surrogate(s)  [ ] Guardian           Name(s): Phone Number(s):  Sister Christin with Daughter Arleth RAIN (I)ADL(s) (prior to admission):    [place]: [ ]Total  [ ] Moderate [ ]Dependent  Palliative Performance Status Version 2:         %    http://npcrc.org/files/news/palliative_performance_scale_[email or web address].pdf    Allergies    fish (Unknown)  penicillins (Angioedema)    Intolerances    MEDICATIONS  (STANDING):  atorvastatin 40 milliGRAM(s) Oral at bedtime  chlorhexidine 2% Cloths 1 Application(s) Topical daily  enoxaparin Injectable 40 milliGRAM(s) SubCutaneous every 24 hours  levothyroxine 100 MICROGram(s) Oral daily    MEDICATIONS  (PRN):  acetaminophen     Tablet .. 650 milliGRAM(s) Oral every 6 hours PRN Temp greater or equal to 38C (100.4F), Mild Pain (1 - 3)  oxyCODONE    IR 5 milliGRAM(s) Oral every 4 hours PRN Severe Pain (7 - 10)        PRESENT SYMPTOMS: [ ]Unable to obtain due to poor mentation   Source if other than patient:  [ ]Family   [ ]Team     Pain: [x ]yes [ ]no  ALL PAIN ASSESSMENT COMPONENTS WERE ASKED ABOUT UNLESS OTHERWISE NOTED  QOL impact -  significant  Location -    arms buttocks  Aggravating factors -  none noted  Quality -  none noted  Radiation -  none noted  Timing- constant  Severity (0-10 scale): severe, not tolerable  Minimal acceptable level (0-10 scale):     CPOT:    [email or web address]/Critical-Care-[email or web address]-Tool-(CPOT)    PAIN AD Score:   [email or web address]/[email or web address] (press ctrl +  left click to view)    Dyspnea:                           [ x]None[ ]Mild [ ]Moderate [ ]Severe     Respiratory Distress Observation Scale (RDOS):   A score of 0 to 2 signifies little or no respiratory distress, 3 signifies mild distress, scores 4 to 6 indicate moderate distress, and scores greater than 7 signify severe distress  [email or web address]    Anxiety:                             [x ]None[ ]Mild [ ]Moderate [ ]Severe   Fatigue:                             [x ]None[ ]Mild [ ]Moderate [ ]Severe   Nausea:                             [x ]None[ ]Mild [ ]Moderate [ ]Severe   Loss of appetite:              [ x]None[ ]Mild [ ]Moderate [ ]Severe   Constipation:                    [x ]None[ ]Mild [ ]Moderate [ ]Severe    Other Symptoms:  [x ]All other review of systems negative     Palliative Performance Status Version 2:         30%    http://npcrc.org/files/news/palliative_performance_scale_[email or web address].pdf    PHYSICAL EXAM:  Vital Signs Last 24 Hrs  T(C): 36.7 (29 May 2024 13:00), Max: 36.9 (28 May 2024 21:35)  T(F): 98.1 (29 May 2024 13:00), Max: 98.4 (28 May 2024 21:35)  HR: 85 (29 May 2024 13:00) (85 - 92)  BP: 93/56 (29 May 2024 13:00) (93/56 - 101/63)  BP(mean): --  RR: 18 (29 May 2024 13:00) (18 - 18)  SpO2: 94% (29 May 2024 13:00) (94% - 94%)          GENERAL:  [ ] No acute distress [ ]Lethargic  [ ]Unarousable  [ x]Verbal  [ ]Non-Verbal [ ]Cachexia    BEHAVIORAL/PSYCH:  [x ]Alert and Oriented x4  [ ] Anxiety [ ] Delirium [ ] Agitation [x ] Calm   EYES: [x ] No scleral icterus [ ] Scleral icterus [ ] Closed  ENMT:  [ ]Dry mouth  [ x]No external oral lesions [ ] No external ear or nose lesions  CARDIOVASCULAR:  [ ]Regular [ ]Irregular [ ]Tachy [x ]Not Tachy  [ ]Ludwig [ ] Edema [ ] No edema  PULMONARY:  [ ]Tachypnea  [ ]Audible excessive secretions [ x] No labored breathing [ ] labored breathing  GASTROINTESTINAL: [ ]Soft  [ ]Distended  [ ]Not distended [ ]Non tender [ ]Tender  MUSCULOSKELETAL: [ ]No clubbing [ ] clubbing  [ ] No cyanosis [ ] cyanosis  NEUROLOGIC: [ ]No focal deficits  [x ]Follows commands  [ ]Does not follow commands  [ ]Cognitive impairment  [ ]Dysphagia  [ ]Dysarthria  [ ]Paresis   SKIN: [ ] Jaundiced [ x] Non-jaundiced [ ]Rash [ ]No Rash [ ] Warm [ ] Dry  MISC/LINES: [ ] ET tube [ ] Trach [ ]NGT/OGT [ ]PEG [ ]George    LABS: reviewed by me                                               CAPILLARY BLOOD GLUCOSE                    RADIOLOGY & ADDITIONAL STUDIES: reviewed by me    < from: CT Chest No Cont (05.21.24 @ 15:51) >  IMPRESSION:    1. Multiple bilateral pulmonary nodules are identified suspicious for   metastases. A nodule is noted in the right upper lobe measuring 13 x 7.2   mm. A nodule measuring 1.2 x 1.2 cm noted in the left upper lobe.   Multiple smaller nodules are noted within the left lung. Additional   right-sided nodules may be obscured by the large effusion and areas of   atelectasis.    2. There is a large right sided pleural effusion, including a large   loculation in the right mid and lower lung field measuring 6 x 6 x 5 cm.   There is a small left-sided pleural effusion.    < end of copied text >        EKG: reviewed by me    < from: 12 Lead ECG (05.21.24 @ 17:16) >  Ventricular Rate 81 BPM    Atrial Rate 81 BPM    P-R Interval 148 ms    QRS Duration 82 ms    Q-T Interval 368 ms    QTC Calculation(Bazett) 427 ms    P Axis 47 degrees    R Axis 47 degrees    T Axis 44 degrees    Diagnosis Line Normal sinus rhythm  Normal ECG    < end of copied text >      PROTEIN CALORIE MALNUTRITION PRESENT: [ ]mild [ ]moderate [ ]severe [ ]underweight [ ]morbid obesity  https://www.andeal.org/vault/2618/web/files/ONC/Table_Clinical%20Characteristics%20to%20Document%20Malnutrition-White%20JV%20et%20al%202012.pdf    Height (cm): 154.9 (05-21-24 @ 22:30)  Weight (kg): 52.8 (05-21-24 @ 22:30), 45.8 (03-12-24 @ 12:28)  BMI (kg/m2): 22 (05-21-24 @ 22:30)  [ ]PPSV2 < or = to 30% [ ]significant weight loss  [ ]poor nutritional intake  [ ]anasarca      [ ]Artificial Nutrition      Palliative Care Spiritual/Emotional Screening Tool Question  Severity (0-4):                    OR                    [ x] Unable to determine. Will assess at later time if appropriate.  Score of 2 or greater indicates recommendation of Chaplaincy and/or SW referral  Chaplaincy Referral: [ ] Yes [ ] Refused [ ] Following     Caregiver [place]:  [ ] Yes [ ] No    OR    [x ] Unable to determine. Will assess at later time if appropriate.  Social Work Referral [ ]  Patient and Family Centered Care Referral [ ]    Anticipatory Grief Present: [ ] Yes [ ] No    OR     [ x] Unable to determine. Will assess at later time if appropriate.  Social Work Referral [ ]  Patient and Family Centered Care Referral [ ]    Patient discussed with primary medical team MD  Palliative care education provided to patient and/or family  
HPI:  69-year-old female with PMHx of LE DVT(2019)on Eliquis, HTN, hypothyroidism, metastatic RCC(diagnosed about 5 yrs ago) s/p L nephrectomy, distal pancreatectomy and splenectomy currently not on chemo or radiationTx (follows with Dr. Voss at [facility]), Anxiety, presents to the ED with confusion, poor oral intake, generalized weakness and fall.     Interval history  -Patient seen at bedside with family  -Endorsed some mild pain at time of visit     ADVANCE DIRECTIVES:     [x ] Full Code [ ] DNR  MOLST  [ ]  Living Will  [ ]   DECISION MAKER(s):  [ ] Health Care Proxy(s)  [x ] Surrogate(s)  [ ] Guardian           Name(s): Phone Number(s):  Sister Christin with Daughter Arleth RAIN (I)ADL(s) (prior to admission):    [place]: [ ]Total  [ ] Moderate [ ]Dependent  Palliative Performance Status Version 2:         %    http://npcrc.org/files/news/palliative_performance_scale_ppsv2.pdf    Allergies    fish (Unknown)  penicillins (Angioedema)    Intolerances    MEDICATIONS  (STANDING):  atorvastatin 40 milliGRAM(s) Oral at bedtime  cefTRIAXone   IVPB 1000 milliGRAM(s) IV Intermittent every 24 hours  chlorhexidine 2% Cloths 1 Application(s) Topical daily  enoxaparin Injectable 40 milliGRAM(s) SubCutaneous every 24 hours  levothyroxine 100 MICROGram(s) Oral daily  sodium chloride 0.9%. 1000 milliLiter(s) (150 mL/Hr) IV Continuous <Continuous>    MEDICATIONS  (PRN):  acetaminophen     Tablet .. 650 milliGRAM(s) Oral every 6 hours PRN Temp greater or equal to 38C (100.4F), Mild Pain (1 - 3)  oxyCODONE    IR 5 milliGRAM(s) Oral every 4 hours PRN Severe Pain (7 - 10)      PRESENT SYMPTOMS: [ ]Unable to obtain due to poor mentation   Source if other than patient:  [ ]Family   [ ]Team     Pain: [x ]yes [ ]no  ALL PAIN ASSESSMENT COMPONENTS WERE ASKED ABOUT UNLESS OTHERWISE NOTED  QOL impact -  moderate  Location -      abdomen              Aggravating factors - none given  Quality - none given  Radiation - none given  Timing- none given  Severity (0-10 scale):none given  Minimal acceptable level (0-10 scale):  none given    CPOT:    https://www.sccm.org/getattachment/erx77i54-7q0j-4j6q-3r9c-7811c6060s5i/Critical-Care-Pain-Observation-Tool-(CPOT)    PAIN AD Score:   http://geriatrictoolkit.[email or web address]/painad.pdf (press ctrl +  left click to view)    Dyspnea:                           [ x]None[ ]Mild [ ]Moderate [ ]Severe     Respiratory Distress Observation Scale (RDOS):   A score of 0 to 2 signifies little or no respiratory distress, 3 signifies mild distress, scores 4 to 6 indicate moderate distress, and scores greater than 7 signify severe distress  https://www.[facility].ca/sites/default/files/PDFS/290350-lsipzkzayfd-phvgmsld-hdavfcyetto-ogoln.pdf    Anxiety:                             [x ]None[ ]Mild [ ]Moderate [ ]Severe   Fatigue:                             [x ]None[ ]Mild [ ]Moderate [ ]Severe   Nausea:                             [x ]None[ ]Mild [ ]Moderate [ ]Severe   Loss of appetite:              [ x]None[ ]Mild [ ]Moderate [ ]Severe   Constipation:                    [x ]None[ ]Mild [ ]Moderate [ ]Severe    Other Symptoms:  [x ]All other review of systems negative     Palliative Performance Status Version 2:         30%    http://[facility].org/files/news/palliative_performance_scale_ppsv2.pdf    PHYSICAL EXAM:  Vital Signs Last 24 Hrs  T(C): 36.6 (23 May 2024 13:28), Max: 36.7 (22 May 2024 20:00)  T(F): 97.8 (23 May 2024 13:28), Max: 98.1 (22 May 2024 20:00)  HR: 84 (23 May 2024 13:28) (83 - 84)  BP: 91/55 (23 May 2024 13:28) (91/55 - 101/62)  BP(mean): --  RR: 18 (23 May 2024 13:28) (18 - 18)  SpO2: 94% (23 May 2024 13:28) (90% - 94%)    Parameters below as of 23 May 2024 04:15  Patient On (Oxygen Delivery Method): room air    GENERAL:  [ ] No acute distress [ ]Lethargic  [ ]Unarousable  [ x]Verbal  [ ]Non-Verbal [ ]Cachexia    BEHAVIORAL/PSYCH:  [x ]Alert and Oriented x2-3  [ ] Anxiety [ ] Delirium [ ] Agitation [x ] Calm   EYES: [x ] No scleral icterus [ ] Scleral icterus [ ] Closed  ENMT:  [ ]Dry mouth  [ x]No external oral lesions [ ] No external ear or nose lesions  CARDIOVASCULAR:  [ ]Regular [ ]Irregular [ ]Tachy [x ]Not Tachy  [ ]Ludwig [ ] Edema [ ] No edema  PULMONARY:  [ ]Tachypnea  [ ]Audible excessive secretions [ x] No labored breathing [ ] labored breathing  GASTROINTESTINAL: [ ]Soft  [ ]Distended  [ ]Not distended [ ]Non tender [ ]Tender  MUSCULOSKELETAL: [ ]No clubbing [ ] clubbing  [ ] No cyanosis [ ] cyanosis  NEUROLOGIC: [ ]No focal deficits  [x ]Follows commands  [ ]Does not follow commands  [ ]Cognitive impairment  [ ]Dysphagia  [ ]Dysarthria  [ ]Paresis   SKIN: [ ] Jaundiced [ x] Non-jaundiced [ ]Rash [ ]No Rash [ ] Warm [ ] Dry  MISC/LINES: [ ] ET tube [ ] Trach [ ]NGT/OGT [ ]PEG [ ]George    LABS: reviewed by me                                   9.2    10.80 )-----------( 493      ( 23 May 2024 04:30 )             29.6       05-23    134<L>  |  101  |  16  ----------------------------<  72  4.6   |  20  |  1.1    Ca    9.8      23 May 2024 04:30  Mg     1.9     05-23    TPro  6.1  /  Alb  2.5<L>  /  TBili  0.3  /  DBili  x   /  AST  29  /  ALT  23  /  AlkPhos  247<H>  05-22              Urinalysis Basic - ( 23 May 2024 04:30 )    Color: x / Appearance: x / SG: x / pH: x  Gluc: 72 mg/dL / Ketone: x  / Bili: x / Urobili: x   Blood: x / Protein: x / Nitrite: x   Leuk Esterase: x / RBC: x / WBC x   Sq Epi: x / Non Sq Epi: x / Bacteria: x        PT/INR - ( 22 May 2024 05:37 )   PT: 22.00 sec;   INR: 1.92 ratio         PTT - ( 22 May 2024 05:37 )  PTT:37.7 sec          CAPILLARY BLOOD GLUCOSE                  RADIOLOGY & ADDITIONAL STUDIES: reviewed by me    < from: CT Chest No Cont (05.21.24 @ 15:51) >  IMPRESSION:    1. Multiple bilateral pulmonary nodules are identified suspicious for   metastases. A nodule is noted in the right upper lobe measuring 13 x 7.2   mm. A nodule measuring 1.2 x 1.2 cm noted in the left upper lobe.   Multiple smaller nodules are noted within the left lung. Additional   right-sided nodules may be obscured by the large effusion and areas of   atelectasis.    2. There is a large right sided pleural effusion, including a large   loculation in the right mid and lower lung field measuring 6 x 6 x 5 cm.   There is a small left-sided pleural effusion.    < end of copied text >        EKG: reviewed by me    < from: 12 Lead ECG (05.21.24 @ 17:16) >  Ventricular Rate 81 BPM    Atrial Rate 81 BPM    P-R Interval 148 ms    QRS Duration 82 ms    Q-T Interval 368 ms    QTC Calculation(Bazett) 427 ms    P Axis 47 degrees    R Axis 47 degrees    T Axis 44 degrees    Diagnosis Line Normal sinus rhythm  Normal ECG    < end of copied text >      PROTEIN CALORIE MALNUTRITION PRESENT: [ ]mild [ ]moderate [ ]severe [ ]underweight [ ]morbid obesity  https://www.andeal.org/vault/2440/web/files/ONC/Table_Clinical%20Characteristics%20to%20Document%20Malnutrition-White%20JV%20et%20al%202012.pdf    Height (cm): 154.9 (05-21-24 @ 22:30)  Weight (kg): 52.8 (05-21-24 @ 22:30), 45.8 (03-12-24 @ 12:28)  BMI (kg/m2): 22 (05-21-24 @ 22:30)  [ ]PPSV2 < or = to 30% [ ]significant weight loss  [ ]poor nutritional intake  [ ]anasarca      [ ]Artificial Nutrition      Palliative Care Spiritual/Emotional Screening Tool Question  Severity (0-4):                    OR                    [ x] Unable to determine. Will assess at later time if appropriate.  Score of 2 or greater indicates recommendation of Chaplaincy and/or SW referral  Chaplaincy Referral: [ ] Yes [ ] Refused [ ] Following     Caregiver [place]:  [ ] Yes [ ] No    OR    [x ] Unable to determine. Will assess at later time if appropriate.  Social Work Referral [ ]  Patient and Family Centered Care Referral [ ]    Anticipatory Grief Present: [ ] Yes [ ] No    OR     [ x] Unable to determine. Will assess at later time if appropriate.  Social Work Referral [ ]  Patient and Family Centered Care Referral [ ]    Patient discussed with primary medical team MD  Palliative care education provided to patient and/or family

## 2024-05-30 NOTE — PROGRESS NOTE ADULT - ASSESSMENT
Physical Examination  CONSTITUTIONAL: Normal appearing  NEUROLOGICAL: Alert and oriented x 3  ENT: Airway patent, Mouth with normal mucosa.   EYES: Pupils equal, Round and reactive to light.  CARDIAC: Normal rate, Regular rhythm.    RESPIRATORY: No wheezing, No crackles, Normal chest expansion, Not tachypneic, No use of accessory muscles  GASTROINTESTINAL: Abdomen soft, Non-tender, No guarding, + BS  SKIN: No rashes or ecchymosis  EXTREMITIES:  2+ Peripheral Pulses, No clubbing, cyanosis, or edema    Assesment  69-year-old female with PMHx of LE DVT(2019)on Eliquis, HTN, hypothyroidism, metastatic RCC(diagnosed about 5 yrs ago) s/p L nephrectomy, distal pancreatectomy and splenectomy currently not on chemo or radiationTx (follows with Dr. Voss at [facility]), Anxiety, presents to the ED with confusion, poor oral intake, generalized weakness and fall.     Plan  # Progressive confusion - suspected mets to brain in patient w/ RCC r/o TME r/o seizures r/o infection, improving   - Patient needs CT imaging w/ contrast (patient and family refusing )  - Large R pleural effusion and small L pleural effusion- most likely malignancy related r/o empyema    - No obvious source of infection at this time.     # Hypercalcemia most likely 2/2 malignancy, resolved  - hold Ca/Vit D    # Metastatic RCC   - CT imaging w/ contrast   - Hem/Onc eval noted.   - Palliative on board.     # Hypothyroidism  - c/w levothyroxine     # Dyslipidemia    - c/w statin     Miscellaneous:  DVT prophylaxis: Lovenox  Bowel  - Feeds: DASH/TLC  - Prophylaxis: None  - Regimen: None  Code status: Full   Pending:  SNF Placement

## 2024-05-30 NOTE — DISCHARGE NOTE NURSING/CASE MANAGEMENT/SOCIAL WORK - NSTRANSFEREYEGLASSESPAIRS_GEN_A_NUR
1 pair Crescentic Intermediate Repair Preamble Text (Leave Blank If You Do Not Want): Undermining was performed with blunt dissection.

## 2024-06-07 NOTE — CDI QUERY NOTE - NSCDIOTHERTXTBX_GEN_ALL_CORE_HH
There is an uncertain diagnosis documented in the medical record that has not been qualified as ruled in, ruled out or remaining uncertain at the time of discharge.     In order to ensure accurate coding and accuracy of the clinical record, the documentation in this patient’s medical record requires additional clarification.      The diagnosis of “likely Toxic metabolic encephalopathy” has been documented in the medical record on [5/21/24] by [Dr. Evaristo Chilel]:      Please review the documentation in the medical record and clarify the appropriate diagnosis (along with any supporting documentation) in your progress note and/or discharge summary.    • Toxic metabolic encephalopathy has been ruled in.  • Toxic metabolic encephalopathy has been ruled out / was excluded at the time of discharge.  • Toxic metabolic encephalopathy remains probable, likely, suspected / could not be excluded at the time of discharge.  • Other (specify)        Supporting documentation and/or clinical evidence:     5/21 ED Provider Note: patient has been increasingly confused over the last month. He also had increasing difficulty with ambulation and has had falls recently …     5/21 H&P Adult: AMS – likely Toxic metabolic encephalopathy r/o brain mets … Progressive confusion – suspected mets to brain in patient w/RCC; r/o TME, r/o seizures, r/o infection    5/30 PN Adult-Hospitalist Resident: No obvious source of infection at this time    5/30 PN Adult- Hospitalist Attending: at this time pt is not confused. She is very alert and oriented x 4 … Metastatic RCC, plan is hospice in [place]    5/30 DN Provider: Her chemotherapy (Inylta) was stopped by her oncologist few weeks ago as it was not working for her. She takes oxycodone 5mg q4 prn for pain and per daughter in law she gets more confused after taking the pain meds. … Progressive confusion secondary to suspected mets to brain in patient w/RCC … Principal discharge diagnosis: Altered mental status. You presented with complaints of progressive confusion secondary to suspected met to brain probably from Renal Cell carcinoma.     Radiology:   5/21 CT Head: Cerebral and cerebellar atrophy. Metastatic disease is not excluded without a postcontrast examination    Nursing Flowsheets:  5/22 (0023): Neuro: Alert; Confused; Disoriented to place/time/situation; Anxious; Agitated  5/22 (2015): Neuro: Altert; Disoriented to time/situation; Calm  5/23 (1009): Neuro: WDL except disoriented to time  5/30 (0725): Neuro: Alert; disoriented to time    Orders:   5/21: Neuro checks Q8H  5/21: NS 1L IV Bolus STAT x 1; NS at 150mL/hr  5/21: Azactam 2G IV STAT (ind: pna); Levaquin 750mg IV x 1 STAT (ind: pneumonia)  5/21-5/22: Flagyl 500mg IV Q8H (ind: empyema); Vancomycin 750mg IV Q24H (ind: empiric)    5/22-5/25: Rocephin 1G IV Q24H (ind: empiric)      Thank you,  Morena CHOWDARY, RN, BSN  [phone]

## 2024-09-18 NOTE — ED ADULT NURSE NOTE - NSFALLASSESSNEED_ED_ALL_ED
It was a pleasure seeing you today, please reach out to our office directly if you have any concerns or questions about your heart.    Dr. Hernandez would like you to return to the clinic in: 12 months     What to bring to your next appointment:   Please make sure to bring your medication bottles to your next scheduled visit, this ensures that your medication list is up to date and accurate.  If you have a home blood pressure cuff that has never been checked for accuracy, please bring to appointment for staff to ensure cuff is accurate.       Please call us if you would like to be seen sooner for any reason  Please call our office with questions or concerns      Dr. Hernandez would like you to:  Non-fasting Labs in Dec-January. We will call you with the results  No medication changes today        
3 days/day(s)
yes

## 2024-11-11 NOTE — DISCHARGE NOTE NURSING/CASE MANAGEMENT/SOCIAL WORK - NSDCPEPTCAREGIVEDUMATLIST _GEN_ALL_CORE
Influenza Vaccination Influenza Vaccination/Apixaban/Eliquis [email or web address]